# Patient Record
Sex: FEMALE | Race: WHITE | NOT HISPANIC OR LATINO | Employment: OTHER | URBAN - METROPOLITAN AREA
[De-identification: names, ages, dates, MRNs, and addresses within clinical notes are randomized per-mention and may not be internally consistent; named-entity substitution may affect disease eponyms.]

---

## 2017-04-03 ENCOUNTER — HOSPITAL ENCOUNTER (OUTPATIENT)
Dept: GENERAL RADIOLOGY | Facility: HOSPITAL | Age: 77
Discharge: HOME | End: 2017-04-03
Admitting: ORTHOPAEDIC SURGERY

## 2017-04-03 ENCOUNTER — APPOINTMENT (OUTPATIENT)
Dept: PREADMISSION TESTING | Facility: HOSPITAL | Age: 77
End: 2017-04-03

## 2017-04-03 VITALS — BODY MASS INDEX: 24.95 KG/M2 | HEIGHT: 64 IN | WEIGHT: 146.16 LBS

## 2017-04-03 LAB
ANION GAP SERPL CALCULATED.3IONS-SCNC: 7 MMOL/L (ref 3–11)
BUN BLD-MCNC: 18 MG/DL (ref 9–23)
BUN/CREAT SERPL: 16.4 (ref 7–25)
CALCIUM SPEC-SCNC: 10 MG/DL (ref 8.7–10.4)
CHLORIDE SERPL-SCNC: 110 MMOL/L (ref 99–109)
CO2 SERPL-SCNC: 30 MMOL/L (ref 20–31)
CREAT BLD-MCNC: 1.1 MG/DL (ref 0.6–1.3)
DEPRECATED RDW RBC AUTO: 48.1 FL (ref 37–54)
ERYTHROCYTE [DISTWIDTH] IN BLOOD BY AUTOMATED COUNT: 14 % (ref 11.3–14.5)
GFR SERPL CREATININE-BSD FRML MDRD: 48 ML/MIN/1.73
GLUCOSE BLD-MCNC: 91 MG/DL (ref 70–100)
HCT VFR BLD AUTO: 38.6 % (ref 34.5–44)
HGB BLD-MCNC: 12.2 G/DL (ref 11.5–15.5)
MCH RBC QN AUTO: 29.4 PG (ref 27–31)
MCHC RBC AUTO-ENTMCNC: 31.6 G/DL (ref 32–36)
MCV RBC AUTO: 93 FL (ref 80–99)
PLATELET # BLD AUTO: 289 10*3/MM3 (ref 150–450)
PMV BLD AUTO: 10.6 FL (ref 6–12)
POTASSIUM BLD-SCNC: 4.9 MMOL/L (ref 3.5–5.5)
RBC # BLD AUTO: 4.15 10*6/MM3 (ref 3.89–5.14)
SODIUM BLD-SCNC: 147 MMOL/L (ref 132–146)
WBC NRBC COR # BLD: 7.12 10*3/MM3 (ref 3.5–10.8)

## 2017-04-03 PROCEDURE — 93010 ELECTROCARDIOGRAM REPORT: CPT | Performed by: INTERNAL MEDICINE

## 2017-04-03 PROCEDURE — 71020 HC CHEST PA AND LATERAL: CPT

## 2017-04-03 PROCEDURE — 93005 ELECTROCARDIOGRAM TRACING: CPT

## 2017-04-03 PROCEDURE — 85027 COMPLETE CBC AUTOMATED: CPT | Performed by: ORTHOPAEDIC SURGERY

## 2017-04-03 PROCEDURE — 36415 COLL VENOUS BLD VENIPUNCTURE: CPT

## 2017-04-03 PROCEDURE — 80048 BASIC METABOLIC PNL TOTAL CA: CPT | Performed by: ORTHOPAEDIC SURGERY

## 2017-04-16 ENCOUNTER — ANESTHESIA EVENT (OUTPATIENT)
Dept: PERIOP | Facility: HOSPITAL | Age: 77
End: 2017-04-16

## 2017-04-16 RX ORDER — FAMOTIDINE 10 MG/ML
20 INJECTION, SOLUTION INTRAVENOUS ONCE
Status: CANCELLED | OUTPATIENT
Start: 2017-04-16 | End: 2017-04-16

## 2017-04-17 ENCOUNTER — APPOINTMENT (OUTPATIENT)
Dept: GENERAL RADIOLOGY | Facility: HOSPITAL | Age: 77
End: 2017-04-17
Attending: ORTHOPAEDIC SURGERY

## 2017-04-17 ENCOUNTER — HOSPITAL ENCOUNTER (OUTPATIENT)
Facility: HOSPITAL | Age: 77
Setting detail: OBSERVATION
Discharge: HOME | End: 2017-04-18
Attending: ORTHOPAEDIC SURGERY | Admitting: ORTHOPAEDIC SURGERY

## 2017-04-17 ENCOUNTER — ANESTHESIA (OUTPATIENT)
Dept: PERIOP | Facility: HOSPITAL | Age: 77
End: 2017-04-17

## 2017-04-17 DIAGNOSIS — Z78.9 IMPAIRED MOBILITY AND ADLS: Primary | ICD-10-CM

## 2017-04-17 DIAGNOSIS — Z74.09 IMPAIRED FUNCTIONAL MOBILITY, BALANCE, GAIT, AND ENDURANCE: ICD-10-CM

## 2017-04-17 DIAGNOSIS — Z74.09 IMPAIRED MOBILITY AND ADLS: Primary | ICD-10-CM

## 2017-04-17 PROBLEM — M89.8X9 HETEROTOPIC OSSIFICATION: Status: ACTIVE | Noted: 2017-04-17

## 2017-04-17 LAB
BILIRUB UR QL STRIP: NEGATIVE
CLARITY UR: CLEAR
COLOR UR: YELLOW
GLUCOSE UR STRIP-MCNC: NEGATIVE MG/DL
HGB UR QL STRIP.AUTO: NEGATIVE
KETONES UR QL STRIP: NEGATIVE
LEUKOCYTE ESTERASE UR QL STRIP.AUTO: NEGATIVE
NITRITE UR QL STRIP: NEGATIVE
PH UR STRIP.AUTO: 6 [PH] (ref 5–8)
PROT UR QL STRIP: NEGATIVE
SP GR UR STRIP: 1.01 (ref 1–1.03)
UROBILINOGEN UR QL STRIP: NORMAL

## 2017-04-17 PROCEDURE — 25010000002 ONDANSETRON PER 1 MG: Performed by: NURSE ANESTHETIST, CERTIFIED REGISTERED

## 2017-04-17 PROCEDURE — 25010000002 PROPOFOL 10 MG/ML EMULSION: Performed by: NURSE ANESTHETIST, CERTIFIED REGISTERED

## 2017-04-17 PROCEDURE — 25010000002 DEXAMETHASONE PER 1 MG: Performed by: NURSE ANESTHETIST, CERTIFIED REGISTERED

## 2017-04-17 PROCEDURE — 25010000003 CEFAZOLIN IN DEXTROSE 2-4 GM/100ML-% SOLUTION: Performed by: ORTHOPAEDIC SURGERY

## 2017-04-17 PROCEDURE — 94799 UNLISTED PULMONARY SVC/PX: CPT

## 2017-04-17 PROCEDURE — 25010000002 FENTANYL CITRATE (PF) 100 MCG/2ML SOLUTION: Performed by: NURSE ANESTHETIST, CERTIFIED REGISTERED

## 2017-04-17 PROCEDURE — G0378 HOSPITAL OBSERVATION PER HR: HCPCS

## 2017-04-17 PROCEDURE — 97166 OT EVAL MOD COMPLEX 45 MIN: CPT | Performed by: OCCUPATIONAL THERAPIST

## 2017-04-17 PROCEDURE — G8988 SELF CARE GOAL STATUS: HCPCS | Performed by: OCCUPATIONAL THERAPIST

## 2017-04-17 PROCEDURE — G8987 SELF CARE CURRENT STATUS: HCPCS | Performed by: OCCUPATIONAL THERAPIST

## 2017-04-17 PROCEDURE — 25010000002 NEOSTIGMINE PER 0.5 MG: Performed by: NURSE ANESTHETIST, CERTIFIED REGISTERED

## 2017-04-17 PROCEDURE — 25010000002 ROPIVACAINE PER 1 MG: Performed by: NURSE ANESTHETIST, CERTIFIED REGISTERED

## 2017-04-17 PROCEDURE — 73080 X-RAY EXAM OF ELBOW: CPT

## 2017-04-17 PROCEDURE — 99219 PR INITIAL OBSERVATION CARE/DAY 50 MINUTES: CPT | Performed by: NURSE PRACTITIONER

## 2017-04-17 PROCEDURE — 97530 THERAPEUTIC ACTIVITIES: CPT | Performed by: OCCUPATIONAL THERAPIST

## 2017-04-17 PROCEDURE — 81003 URINALYSIS AUTO W/O SCOPE: CPT | Performed by: NURSE PRACTITIONER

## 2017-04-17 RX ORDER — SODIUM CHLORIDE 0.9 % (FLUSH) 0.9 %
1-10 SYRINGE (ML) INJECTION AS NEEDED
Status: DISCONTINUED | OUTPATIENT
Start: 2017-04-17 | End: 2017-04-17 | Stop reason: HOSPADM

## 2017-04-17 RX ORDER — FAMOTIDINE 20 MG/1
20 TABLET, FILM COATED ORAL ONCE
Status: COMPLETED | OUTPATIENT
Start: 2017-04-17 | End: 2017-04-17

## 2017-04-17 RX ORDER — DEXAMETHASONE SODIUM PHOSPHATE 4 MG/ML
INJECTION, SOLUTION INTRA-ARTICULAR; INTRALESIONAL; INTRAMUSCULAR; INTRAVENOUS; SOFT TISSUE AS NEEDED
Status: DISCONTINUED | OUTPATIENT
Start: 2017-04-17 | End: 2017-04-17 | Stop reason: SURG

## 2017-04-17 RX ORDER — ROPIVACAINE HYDROCHLORIDE 2 MG/ML
6 INJECTION, SOLUTION EPIDURAL; INFILTRATION CONTINUOUS
Status: DISCONTINUED | OUTPATIENT
Start: 2017-04-17 | End: 2017-04-18 | Stop reason: HOSPADM

## 2017-04-17 RX ORDER — GLYCOPYRROLATE 0.2 MG/ML
INJECTION INTRAMUSCULAR; INTRAVENOUS AS NEEDED
Status: DISCONTINUED | OUTPATIENT
Start: 2017-04-17 | End: 2017-04-17 | Stop reason: SURG

## 2017-04-17 RX ORDER — LIDOCAINE HYDROCHLORIDE 10 MG/ML
INJECTION, SOLUTION INFILTRATION; PERINEURAL AS NEEDED
Status: DISCONTINUED | OUTPATIENT
Start: 2017-04-17 | End: 2017-04-17 | Stop reason: SURG

## 2017-04-17 RX ORDER — SODIUM CHLORIDE, SODIUM LACTATE, POTASSIUM CHLORIDE, CALCIUM CHLORIDE 600; 310; 30; 20 MG/100ML; MG/100ML; MG/100ML; MG/100ML
INJECTION, SOLUTION INTRAVENOUS CONTINUOUS PRN
Status: DISCONTINUED | OUTPATIENT
Start: 2017-04-17 | End: 2017-04-17 | Stop reason: SURG

## 2017-04-17 RX ORDER — CEFAZOLIN SODIUM 2 G/100ML
2 INJECTION, SOLUTION INTRAVENOUS ONCE
Status: COMPLETED | OUTPATIENT
Start: 2017-04-17 | End: 2017-04-17

## 2017-04-17 RX ORDER — SODIUM CHLORIDE 0.9 % (FLUSH) 0.9 %
1-10 SYRINGE (ML) INJECTION AS NEEDED
Status: DISCONTINUED | OUTPATIENT
Start: 2017-04-17 | End: 2017-04-18 | Stop reason: HOSPADM

## 2017-04-17 RX ORDER — NALOXONE HCL 0.4 MG/ML
0.1 VIAL (ML) INJECTION
Status: DISCONTINUED | OUTPATIENT
Start: 2017-04-17 | End: 2017-04-18 | Stop reason: HOSPADM

## 2017-04-17 RX ORDER — SODIUM CHLORIDE 9 MG/ML
9 INJECTION, SOLUTION INTRAVENOUS CONTINUOUS
Status: DISCONTINUED | OUTPATIENT
Start: 2017-04-17 | End: 2017-04-18 | Stop reason: HOSPADM

## 2017-04-17 RX ORDER — ONDANSETRON 2 MG/ML
4 INJECTION INTRAMUSCULAR; INTRAVENOUS ONCE AS NEEDED
Status: DISCONTINUED | OUTPATIENT
Start: 2017-04-17 | End: 2017-04-17 | Stop reason: HOSPADM

## 2017-04-17 RX ORDER — HYDROMORPHONE HYDROCHLORIDE 1 MG/ML
0.5 INJECTION, SOLUTION INTRAMUSCULAR; INTRAVENOUS; SUBCUTANEOUS
Status: DISCONTINUED | OUTPATIENT
Start: 2017-04-17 | End: 2017-04-18 | Stop reason: HOSPADM

## 2017-04-17 RX ORDER — SODIUM CHLORIDE, SODIUM LACTATE, POTASSIUM CHLORIDE, CALCIUM CHLORIDE 600; 310; 30; 20 MG/100ML; MG/100ML; MG/100ML; MG/100ML
9 INJECTION, SOLUTION INTRAVENOUS CONTINUOUS
Status: DISCONTINUED | OUTPATIENT
Start: 2017-04-17 | End: 2017-04-17

## 2017-04-17 RX ORDER — VANCOMYCIN HYDROCHLORIDE 1 G/200ML
1000 INJECTION, SOLUTION INTRAVENOUS ONCE
Status: DISCONTINUED | OUTPATIENT
Start: 2017-04-17 | End: 2017-04-17

## 2017-04-17 RX ORDER — CEFAZOLIN SODIUM 2 G/100ML
2 INJECTION, SOLUTION INTRAVENOUS EVERY 8 HOURS
Status: COMPLETED | OUTPATIENT
Start: 2017-04-17 | End: 2017-04-18

## 2017-04-17 RX ORDER — SENNA AND DOCUSATE SODIUM 50; 8.6 MG/1; MG/1
2 TABLET, FILM COATED ORAL 2 TIMES DAILY
Status: DISCONTINUED | OUTPATIENT
Start: 2017-04-17 | End: 2017-04-18 | Stop reason: HOSPADM

## 2017-04-17 RX ORDER — LIDOCAINE HYDROCHLORIDE 10 MG/ML
1 INJECTION, SOLUTION EPIDURAL; INFILTRATION; INTRACAUDAL; PERINEURAL ONCE
Status: COMPLETED | OUTPATIENT
Start: 2017-04-17 | End: 2017-04-17

## 2017-04-17 RX ORDER — CEFAZOLIN SODIUM 2 G/100ML
2 INJECTION, SOLUTION INTRAVENOUS ONCE
Status: DISCONTINUED | OUTPATIENT
Start: 2017-04-17 | End: 2017-04-17

## 2017-04-17 RX ORDER — MAGNESIUM HYDROXIDE 1200 MG/15ML
LIQUID ORAL AS NEEDED
Status: DISCONTINUED | OUTPATIENT
Start: 2017-04-17 | End: 2017-04-17 | Stop reason: HOSPADM

## 2017-04-17 RX ORDER — FENTANYL CITRATE 50 UG/ML
50 INJECTION, SOLUTION INTRAMUSCULAR; INTRAVENOUS
Status: DISCONTINUED | OUTPATIENT
Start: 2017-04-17 | End: 2017-04-17 | Stop reason: HOSPADM

## 2017-04-17 RX ORDER — FENTANYL CITRATE 50 UG/ML
INJECTION, SOLUTION INTRAMUSCULAR; INTRAVENOUS AS NEEDED
Status: DISCONTINUED | OUTPATIENT
Start: 2017-04-17 | End: 2017-04-17 | Stop reason: SURG

## 2017-04-17 RX ORDER — BISACODYL 10 MG
10 SUPPOSITORY, RECTAL RECTAL DAILY PRN
Status: DISCONTINUED | OUTPATIENT
Start: 2017-04-17 | End: 2017-04-18 | Stop reason: HOSPADM

## 2017-04-17 RX ORDER — ONDANSETRON 2 MG/ML
INJECTION INTRAMUSCULAR; INTRAVENOUS AS NEEDED
Status: DISCONTINUED | OUTPATIENT
Start: 2017-04-17 | End: 2017-04-17 | Stop reason: SURG

## 2017-04-17 RX ORDER — HYDROCODONE BITARTRATE AND ACETAMINOPHEN 10; 325 MG/1; MG/1
1 TABLET ORAL EVERY 4 HOURS PRN
Status: DISCONTINUED | OUTPATIENT
Start: 2017-04-17 | End: 2017-04-18 | Stop reason: HOSPADM

## 2017-04-17 RX ORDER — PROPOFOL 10 MG/ML
VIAL (ML) INTRAVENOUS AS NEEDED
Status: DISCONTINUED | OUTPATIENT
Start: 2017-04-17 | End: 2017-04-17 | Stop reason: SURG

## 2017-04-17 RX ORDER — ATRACURIUM BESYLATE 10 MG/ML
INJECTION, SOLUTION INTRAVENOUS AS NEEDED
Status: DISCONTINUED | OUTPATIENT
Start: 2017-04-17 | End: 2017-04-17 | Stop reason: SURG

## 2017-04-17 RX ORDER — DOCUSATE SODIUM 100 MG/1
100 CAPSULE, LIQUID FILLED ORAL 2 TIMES DAILY PRN
Status: DISCONTINUED | OUTPATIENT
Start: 2017-04-17 | End: 2017-04-18 | Stop reason: HOSPADM

## 2017-04-17 RX ORDER — BISACODYL 5 MG/1
10 TABLET, DELAYED RELEASE ORAL DAILY PRN
Status: DISCONTINUED | OUTPATIENT
Start: 2017-04-17 | End: 2017-04-18 | Stop reason: HOSPADM

## 2017-04-17 RX ADMIN — ONDANSETRON 4 MG: 2 INJECTION INTRAMUSCULAR; INTRAVENOUS at 09:05

## 2017-04-17 RX ADMIN — FENTANYL CITRATE 50 MCG: 50 INJECTION, SOLUTION INTRAMUSCULAR; INTRAVENOUS at 07:47

## 2017-04-17 RX ADMIN — SODIUM CHLORIDE 9 ML/HR: 9 INJECTION, SOLUTION INTRAVENOUS at 14:48

## 2017-04-17 RX ADMIN — PROPOFOL 120 MG: 10 INJECTION, EMULSION INTRAVENOUS at 07:47

## 2017-04-17 RX ADMIN — Medication 2 TABLET: at 17:44

## 2017-04-17 RX ADMIN — ATRACURIUM BESYLATE 40 MG: 10 INJECTION, SOLUTION INTRAVENOUS at 07:47

## 2017-04-17 RX ADMIN — PROPOFOL 30 MG: 10 INJECTION, EMULSION INTRAVENOUS at 09:54

## 2017-04-17 RX ADMIN — FENTANYL CITRATE 50 MCG: 50 INJECTION INTRAMUSCULAR; INTRAVENOUS at 10:52

## 2017-04-17 RX ADMIN — ROBINUL 0.4 MG: 0.2 INJECTION INTRAMUSCULAR; INTRAVENOUS at 09:05

## 2017-04-17 RX ADMIN — PROPOFOL 20 MG: 10 INJECTION, EMULSION INTRAVENOUS at 09:56

## 2017-04-17 RX ADMIN — ROPIVACAINE HYDROCHLORIDE 6 ML/HR: 2 INJECTION, SOLUTION EPIDURAL; INFILTRATION at 10:07

## 2017-04-17 RX ADMIN — EPHEDRINE SULFATE 15 MG: 50 INJECTION INTRAMUSCULAR; INTRAVENOUS; SUBCUTANEOUS at 08:00

## 2017-04-17 RX ADMIN — FENTANYL CITRATE 50 MCG: 50 INJECTION, SOLUTION INTRAMUSCULAR; INTRAVENOUS at 09:15

## 2017-04-17 RX ADMIN — SODIUM CHLORIDE, POTASSIUM CHLORIDE, SODIUM LACTATE AND CALCIUM CHLORIDE: 600; 310; 30; 20 INJECTION, SOLUTION INTRAVENOUS at 07:42

## 2017-04-17 RX ADMIN — FAMOTIDINE 20 MG: 20 TABLET ORAL at 06:30

## 2017-04-17 RX ADMIN — CEFAZOLIN SODIUM 2 G: 2 INJECTION, SOLUTION INTRAVENOUS at 07:45

## 2017-04-17 RX ADMIN — LIDOCAINE HYDROCHLORIDE 50 MG: 10 INJECTION, SOLUTION INFILTRATION; PERINEURAL at 07:47

## 2017-04-17 RX ADMIN — CEFAZOLIN SODIUM 2 G: 2 INJECTION, SOLUTION INTRAVENOUS at 15:04

## 2017-04-17 RX ADMIN — LIDOCAINE HYDROCHLORIDE 0.2 ML: 10 INJECTION, SOLUTION EPIDURAL; INFILTRATION; INTRACAUDAL; PERINEURAL at 06:26

## 2017-04-17 RX ADMIN — Medication 5 MG: at 22:10

## 2017-04-17 RX ADMIN — Medication 3 MG: at 09:05

## 2017-04-17 RX ADMIN — DEXAMETHASONE SODIUM PHOSPHATE 8 MG: 4 INJECTION, SOLUTION INTRAMUSCULAR; INTRAVENOUS at 07:57

## 2017-04-17 RX ADMIN — SODIUM CHLORIDE 9 ML/HR: 9 INJECTION, SOLUTION INTRAVENOUS at 06:26

## 2017-04-17 NOTE — CONSULTS
[facility] Medicine Services  HISTORY AND PHYSICAL    Primary Care Physician: Darin Baugh MD  Subjective   Chief Complaint:  Elbow surgery    History of Present Illness:   Mrs. Patterson is a 76-year-old female who was admitted today, 4/17/17, for surgery on her left elbow to reduce a contracture from previous fracture.  Patient states that her entire arm and hand are numb.  Patient has no complaints at this time.  Patient thought she was going home after her surgery, but Dr. Bonilla told her he wanted her to stay.    Review of Systems   Constitutional: Negative for activity change, appetite change, chills, fatigue and fever.   HENT: Negative for congestion, mouth sores, rhinorrhea and sore throat.    Eyes: Negative.    Respiratory: Negative for cough, shortness of breath and wheezing.    Cardiovascular: Negative for chest pain and leg swelling.   Gastrointestinal: Negative for abdominal distention, abdominal pain, constipation, diarrhea, nausea and vomiting.   Endocrine: Negative.    Genitourinary: Positive for frequency and urgency. Negative for difficulty urinating and dysuria.   Musculoskeletal: Positive for joint swelling (left elbow). Negative for back pain, neck pain and neck stiffness.   Skin: Negative for color change, pallor and rash.   Neurological: Positive for numbness (left hand s/p surgery). Negative for dizziness, facial asymmetry, speech difficulty and weakness.   Hematological: Negative for adenopathy.   Psychiatric/Behavioral: Negative for confusion, sleep disturbance and suicidal ideas. The patient is not nervous/anxious.    Otherwise complete ROS performed and negative except as mentioned in the HPI.    Past Medical History:   Diagnosis Date   • Arthritis    • Chest pain syndrome 9/7/2016   • Dyslipidemia 9/7/2016   • Glaucoma 9/7/2016   • Insomnia 9/7/2016   • Osteoporosis    • Polymyalgia rheumatica 9/7/2016   • PONV (postoperative nausea and vomiting)     AFTER  "HYSTERECTOMY   • Renal disorder    • Rotator cuff tear, left    • Wears glasses     READERS     Past Surgical History:   Procedure Laterality Date   • CARPAL TUNNEL RELEASE      LEFT   • CATARACT EXTRACTION      BILAT    • COLONOSCOPY      2013   • HYSTERECTOMY      TOTAL   • JOINT REPLACEMENT      KNEE LEFT REPLACEMENT   • ORIF HUMERUS FRACTURE Right 09/12/2016   • REPLACEMENT TOTAL KNEE  2003   • SKIN GRAFT      right foot 2014   • TOTAL ELBOW ARTHROPLASTY Right 09/12/2016   • TOTAL ELBOW ARTHROPLASTY Right 9/12/2016    Procedure: TOTAL ELBOW ARTHROPLASTY; ULNAR NERVE TRANSPOSITION;  Surgeon: Trung Bonilla MD;  Location: [facility];  Service:      Family History   Problem Relation Age of Onset   • Cancer Father      Social History     Social History   • Marital status:      Spouse name: N/A   • Number of children: N/A   • Years of education: N/A     Occupational History   • Not on file.     Social History Main Topics   • Smoking status: Never Smoker   • Smokeless tobacco: Never Used   • Alcohol use No   • Drug use: No   • Sexual activity: Defer     Other Topics Concern   • Not on file     Social History Narrative     Medications:  Prescriptions Prior to Admission   Medication Sig Dispense Refill Last Dose   • aspirin 81 MG EC tablet Take 81 mg by mouth Daily.   4/16/2017 at Unknown time   • cholecalciferol (VITAMIN D3) 1000 UNITS tablet Take 2,000 Units by mouth daily.   4/16/2017   • ferrous sulfate 325 (65 FE) MG tablet Take 325 mg by mouth daily with breakfast.   4/16/2017 at 0700   • Raloxifene HCl (EVISTA PO) Take 60 mg by mouth daily.   Past Week at Unknown time   • SIMVASTATIN PO Take 40 mg by mouth daily.   Past Week at Unknown time   • diphenhydrAMINE (NIGHTTIME SLEEP AID) 25 MG tablet Take 25 mg by mouth at night as needed for sleep.   4/15/2017     Allergies:  Phenergan  Objective   Physical Exam:  Vital Signs: /54  Pulse 64  Temp 97.9 °F (36.6 °C)  Resp 16  Ht 64\" (162.6 cm)  Wt " 146 lb (66.2 kg)  LMP Comment: >55 years old  SpO2 97%  Breastfeeding? No  BMI 25.06 kg/m2  Physical Exam  General: Alert, well-developed well-nourished female in no acute distress    Head: Normocephalic atraumatic    Eyes: PERRLA, EOMI, nonicteric, conjunctiva normal    ENT: Pink, moist mucous membranes    Neck: Supple, nontender, trachea midline without lymphadenopathy, JVD, nuchal rigidity.      Cardiovascular: RRR  no M/R/G    Respiratory: Nonlabored, symmetrical chest expansion, clear to auscultation bilaterally    Abdomen: Soft, nontender, nondistended,  positive bowel sounds in all 4 quadrants     Extremities: FROM in upper and lower extremities bilaterally with the exception of left elbow which is wrapped in Ace wrap bandage status post surgery.  No E/C/C  Negative calf pain.  +2 DP pulses bilaterally.  Neuro/Yanez catheter in right neck    Skin: Pink/warm/dry.  No rash or lesions noted. Capillary refill<2sec in right fingertips distal to right elbow.    Neuro: Alert and oriented to person place time and situation, speech is clear, follows all commands, recent and remote memory intact    Psych: Patient is pleasant and cooperative.  Normal affect.  Negative suicidal ideation or homicidal ideation.    Results Reviewed:  Imaging Results (last 24 hours)     Procedure Component Value Units Date/Time    XR Elbow 3+ View Right [[id number]] Collected:  04/17/17 1411     Updated:  04/17/17 1411    Narrative:       EXAMINATION: XR ELBOW 3+ VW RIGHT- 04/17/2017     INDICATION: Total elbow s/p excision of HO and capsular release      COMPARISON: 09/03/2016 right elbow plain films, and 09/12/2016  intraoperative images.     FINDINGS: History indicates recent surgery including capsular release.  The elbow joint prosthesis appears anatomically aligned. The bony  structures appear intact. There is some soft tissue gas dorsally,  typical for postop patient. No fracture line or bony avulsion is  identified.        Impression:       Elbow prosthesis in anatomic alignment.         D:  04/17/2017  E:  04/17/2017           I have personally reviewed and interpreted available lab data, radiology studies and ECG obtained at time of admission  Assessment / Plan   Assessment/Problem List:   Hospital Problem List     Dyslipidemia    GERD (gastroesophageal reflux disease)    CKD (chronic kidney disease) stage 3, GFR 30-59 ml/min    Heterotopic ossification   Plan:  Heterotopic ossification  --Followed and managed by Dr Bonilla  --IV Ancef pre surgery  --peripheral nerve cath in place   --IS  --Pain controlled; right arm numb    Dyslipidemia  --Home dose simvastatin    GERD (gastroesophageal reflux disease)  --famotidine    CKD (chronic kidney disease) stage 3, GFR 30-59 ml/min  --give nephrotoxic medications sparingly    Urinary frequency  --UA pending    DVT prophylaxis:  SCD/TEDs  Code Status: Full  Dispo:  Will most likely discharge tomorrow, but will be up to ORION Cazares 04/17/17 3:17 PM    I was available for questions, however patient care provided by orion

## 2017-04-17 NOTE — ANESTHESIA PREPROCEDURE EVALUATION
Anesthesia Evaluation     Patient summary reviewed and Nursing notes reviewed      Airway   Mallampati: II  TM distance: >3 FB  Neck ROM: full  no difficulty expected  Dental - normal exam     Pulmonary - negative pulmonary ROS and normal exam   Cardiovascular - negative cardio ROS and normal exam        Neuro/Psych- negative ROS  GI/Hepatic/Renal/Endo    (+)  GERD, chronic renal disease CRI,     Musculoskeletal     Abdominal  - normal exam    Bowel sounds: normal.   Substance History - negative use     OB/GYN negative ob/gyn ROS         Other   (+) arthritis                               Anesthesia Plan    ASA 2     general   (Peripheral nerve block for post op pain relief)  intravenous induction   Anesthetic plan and risks discussed with patient.    Plan discussed with CRNA.

## 2017-04-17 NOTE — PLAN OF CARE
Problem: Patient Care Overview (Adult)  Goal: Plan of Care Review  Outcome: Ongoing (interventions implemented as appropriate)    04/17/17 1824   Coping/Psychosocial Response Interventions   Plan Of Care Reviewed With patient   Outcome Evaluation   Outcome Summary/Follow up Plan OT evaluation complete and pt education initiated. Pt had no c/o pain and tolerated AAROM right elbow . Pt impusive, poor safety awareness, reports h/o multiple falls and her spouse is curently [facility]. Recommend IP rehab, pt declines and desire DC home. Recommend HHOT/PT services. Pt failed mobility screen with score off 16, recommend PT evaluation.          Problem: Inpatient Occupational Therapy  Goal: Transfer Training Goal 1 LTG- OT  Outcome: Ongoing (interventions implemented as appropriate)    04/17/17 1824   Transfer Training OT LTG   Transfer Training OT LTG, Date Established 04/17/17   Transfer Training OT LTG, Time to Achieve by discharge   Transfer Training OT LTG, Activity Type sit to stand/stand to sit;toilet   Transfer Training OT LTG, [place] Level verbal cues required;supervision required   Transfer Training OT LTG, Assist Device (AD PRN)       Goal: Range of Motion Goal LTG- OT  Outcome: Ongoing (interventions implemented as appropriate)    04/17/17 1824   Range of Motion OT LTG   Range of Motion Goal OT LTG, Date Established 04/17/17   Range of Motion Goal OT LTG, Time to Achieve by discharge   Range of Motion Goal OT LTG, AROM Measure Pt will compelte RUE HEP with supervision in preparation for safe DC home.        Goal: Patient Education Goal LTG- OT  Outcome: Ongoing (interventions implemented as appropriate)    04/17/17 1824   Patient Education OT LTG   Patient Education OT LTG, Date Established 04/17/17   Patient Education OT LTG, Time to Achieve by discharge   Patient Education OT LTG, Education Type written program;precautions per surgeon;1 hand/maurice technique;home safety   Patient Education  OT LTG, Education Understanding demonstrates adequately;verbalizes understanding       Goal: UB Dressing Goal LTG- OT  Outcome: Ongoing (interventions implemented as appropriate)    04/17/17 1824   UB Dressing OT LTG   UB Dressing Goal OT LTG, Date Established 04/17/17   UB Dressing Goal OT LTG, Time to Achieve by discharge   UB Dressing Goal OT LTG, Activity Type Pt will complete UB dressing task   UB Dressing Goal OT LTG, [place] Level minimum assist (75% patient effort);verbal cues required

## 2017-04-17 NOTE — BRIEF OP NOTE
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]    Procedure/CPT® Codes:  [place] RADICAL RESECT ELBOW, CONTRAC RELEAS [09394]    Procedure(s):  Right elbow radical resection capsule soft tissue heterotopic bone with contracture release    Surgeon(s):  MD Nicanor Byrd MD, PGY-5    Anesthesia: General with Block    Staff:   Circulator: Kassidy Cota RN  Scrub Person: Tenzin Mosqueda  Nursing Assistant: Bryon Chavez    Estimated Blood Loss: * No values recorded between 4/17/2017  7:42 AM and 4/17/2017  9:25 AM *  Urine Voided: * No values recorded between 4/17/2017  7:42 AM and 4/17/2017  9:25 AM *    Specimens:                * No specimens in log *      Drains:           Findings: per dictation    Complications: none      Trung Bonilla MD     Date: 4/17/2017  Time: 9:25 AM

## 2017-04-17 NOTE — PLAN OF CARE
Problem: Perioperative Period (Adult)  Goal: Signs and Symptoms of Listed Potential Problems Will be Absent or Manageable (Perioperative Period)  Outcome: Ongoing (interventions implemented as appropriate)    04/17/17 1301   Perioperative Period   Problems Assessed (Perioperative Period) all   Problems Present (Perioperative Period) situational response

## 2017-04-17 NOTE — PROGRESS NOTES
Acute Care - Occupational Therapy Initial Evaluation   [facility]     Patient Name: Yanet Grove  : 1940  MRN: [id number]  Today's Date: 2017  Onset of Illness/Injury or Date of Surgery Date: 17  Date of Referral to OT: 17  Referring Physician: Dr. Bonilla    Admit Date: 2017       ICD-10-CM ICD-9-CM   1. Impaired mobility and ADLs Z74.09 799.89     Patient Active Problem List   Diagnosis   • Chest pain syndrome   • Insomnia   • Dyslipidemia   • Glaucoma   • Polymyalgia rheumatica   • Closed fracture of right distal humerus   • CKD (chronic kidney disease) stage 3, GFR 30-59 ml/min   • Impaired functional mobility, balance, gait, and endurance   • Heterotopic ossification     Past Medical History:   Diagnosis Date   • Arthritis    • Chest pain syndrome 2016   • Dyslipidemia 2016   • GERD (gastroesophageal reflux disease) 2016   • Glaucoma 2016   • Insomnia 2016   • Osteoporosis    • Polymyalgia rheumatica 2016   • PONV (postoperative nausea and vomiting)     AFTER HYSTERECTOMY   • Renal disorder    • Rotator cuff tear, left    • Wears glasses     READERS     Past Surgical History:   Procedure Laterality Date   • CARPAL TUNNEL RELEASE      LEFT   • CATARACT EXTRACTION      BILAT    • COLONOSCOPY         • HYSTERECTOMY      TOTAL   • JOINT REPLACEMENT      KNEE LEFT REPLACEMENT   • ORIF HUMERUS FRACTURE Right 2016   • REPLACEMENT TOTAL KNEE     • SKIN GRAFT      right foot    • TOTAL ELBOW ARTHROPLASTY Right 2016   • TOTAL ELBOW ARTHROPLASTY Right 2016    Procedure: TOTAL ELBOW ARTHROPLASTY; ULNAR NERVE TRANSPOSITION;  Surgeon: Trung Bonilla MD;  Location: [facility];  Service:           OT ASSESSMENT FLOWSHEET (last 72 hours)      OT Evaluation       17 1610 17 1242 17 1135 17 0623       Rehab Evaluation    Document Type evaluation  -AR        Subjective Information no complaints;agree to therapy  -AR         Patient Effort, Rehab Treatment good  -AR        Symptoms Noted During/After Treatment none  -AR        General Information    Patient Profile Review yes  -AR        Onset of Illness/Injury or Date of Surgery Date 04/17/17  -AR        Referring Physician Dr. Bonilla  -AR        General Observations Pt up in chair, no family at bedside and RUE elevated  -AR        Pertinent History Of Current Problem Pt is a 76 yof h/o R comminuted intraarticular distal humerus FX s/p fall 9/6/16 with resultant right TEA 9/12/16. 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 Pt is POD#0 resection of heterotopic ossification and anterior and posterior capsular release with resection of capsule. OT orders indicate agressive R elbow ROM, and NWB RUE. Pt is R-hand dominant. Prior to admission, she reports pain and difficulty with self-feeding, grooming, completing UB ADLS and pain interrupted sleep at night.    -AR        Precautions/Limitations fall precautions;non-weight bearing status;other (see comments)   infraclavicular nerve catheter  -AR        Prior Level of Function independent:;all household mobility;community mobility;gait;transfer;min assist:;ADL's;home management;cooking;cleaning;driving;shopping  -AR        Equipment Currently Used at Home cane, straight;commode  -AR   none  -RS     Plans/Goals Discussed With patient;agreed upon  -AR        Risks Reviewed patient:;LOB;nausea/vomiting;dizziness;increased discomfort;change in vital signs;increased drainage;lines disloged  -AR        Benefits Reviewed patient:;increase independence;increase strength;increase balance;decrease risk of DVT;increase knowledge  -AR        Barriers to Rehab family issues  -AR        Living Environment    Lives With spouse  -AR   spouse  -RS     Living Arrangements house  -AR   house  -RS     Home  Accessibility no concerns  -AR   no concerns  -RS     Stair Railings at Home    none  -RS     Type of Financial/Environmental Concern    none  -RS     Transportation Available car;family or friend will provide  -AR   car;family or friend will provide  -RS     Living Environment Comment Pt lives with her spouse who is curently hospitalized at [facility] and unable to assist. Pt reports h/o several recent falls and has nodule of face from recent fall. No one is available to assist her at home.   -AR        Clinical Impression    Date of Referral to OT 04/17/17  -AR        OT Diagnosis decreased independence with ADLs  -AR        Patient/Family Goals Statement return home and care for her   -AR        Criteria for Skilled Therapeutic Interventions Met yes;treatment indicated  -AR        Rehab Potential good, to achieve stated therapy goals  -AR        Therapy Frequency daily   per priority policy  -AR        Anticipated Discharge Disposition inpatient rehabilitation facility   pt declines and desire DC home  -AR        Functional Level Prior    Ambulation  0-->independent  -LC  0-->independent  -RS     Transferring  0-->independent  -LC  0-->independent  -RS     Toileting  0-->independent  -LC  0-->independent  -RS     Bathing  0-->independent  -LC  0-->independent  -RS     Dressing  0-->independent  -LC  0-->independent  -RS     Eating  0-->independent  -LC  0-->independent  -RS     Communication  0-->understands/communicates without difficulty  -LC  0-->understands/communicates without difficulty  -RS     Swallowing  0-->swallows foods/liquids without difficulty  -LC  0-->swallows foods/liquids without difficulty  -RS     Prior Functional Level Comment  pt was independent with ADL's  -LC       Pain Assessment    Pain Assessment No/denies pain  -AR        Vision Assessment/Intervention    Visual Impairment WFL with corrective lenses  -AR        Vision Comment --   glasses unavailable, at pt's home  -AR         Cognitive Assessment/Intervention    Current Cognitive/Communication Assessment functional  -AR        Orientation Status oriented x 4  -AR        Follows Commands/Answers Questions 100% of the time  -AR        Personal Safety mild impairment;impulsive;decreased awareness, need for safety  -AR        Personal Safety Interventions fall prevention program maintained  -AR        Additional Documentation --   Exit alarm placed, RN aware  -AR        ROM (Range of Motion)    General ROM --   LUE WFL, RUE impaired thoroughout  -AR        MMT (Manual Muscle Testing)    General MMT Assessment other (see comments)   LUE WFL, RUE deferred  -AR        Muscle Tone Assessment    Muscle Tone Assessment   Bilateral Upper Extremities;Bilateral Lower Extremities  -LC      Bilateral Upper Extremities Muscle Tone Assessment   mildly decreased tone  -LC      Bilateral Lower Extremities Muscle Tone Assessment   mildly decreased tone  -LC      Mobility Assessment/Training    Extremity Weight-Bearing Status right upper extremity  -AR        Right Upper Extremity Weight-Bearing non weight-bearing  -AR        Bed Mobility, Assessment/Treatment    Bed Mobility, Comment Not observed, pt UIC. Educated her on NWB RUE and importance of maintaining during bed mobility activities.  -AR        Transfer Assessment/Treatment    Transfers, Sit-Stand [place] contact guard assist;verbal cues required  -AR        Transfers, Stand-Sit [place] contact guard assist;verbal cues required  -AR        Transfers, Sit-Stand-Sit, Assist Device other (see comments)   LUE support  -AR        Toilet Transfer, [place] minimum assist (75% patient effort);verbal cues required  -AR        Transfer, Impairments ROM decreased;impaired balance;other (see comments)   poor overall safety awareness  -AR        Transfer, Comment Pt hesitant to allow OT to use gait belt. Pt reports h/o falls but does not want assist with mobility. Poor awareness of lines during  transfer and pt standing prior to OT donning gait belt despite therapist asking her ot wait for application of belt prior to standing. Educated pt on importance of being aware of environment and attending to RUE as not to bump it on doorways or fixed object and to avoid reaching back for chair and putting weight through operated leg. Pt with decreased attention to therapists instruction.    -AR        Functional Mobility    Functional Mobility- Ind. Level minimum assist (75% patient effort);verbal cues required  -AR        Functional Mobility- Device other (see comments)   HHA LUE, pt reports using cane in past and may benefit from   -AR        Functional Mobility-Distance (Feet) 200  -AR        Functional Mobility- Comment pt with LOB episode when HHA to LUE was removed. Pt scored 16 on mobility screen, will need PT evaluation.   -AR        Upper Body Bathing Assessment/Training    UB Bathing Assess/Train, Comment Educated pt on right axilla care for comfort and importance of keeping dressing site at nerve catheter dry  -AR        Upper Body Dressing Assessment/Training    UB Dressing Assess/Train, Comment Educated pt on maurice-dressing for comfort and care of nerve catheter during UB ADLS.   -AR        Lower Body Dressing Assessment/Training    LB Dressing Assess/Train, Clothing Type donning:;slipper socks  -AR        LB Dressing Assess/Train, Position sitting  -AR        LB Dressing Assess/Train, [place] maximum assist (25% patient effort)  -AR        Toileting Assessment/Training    Toileting Assess/Train, Position sitting;standing  -AR        Toileting Assess/Train, Indepen Level contact guard assist   post-toilet hygiene and clothing management  -AR        Toileting Assess/Train, Impairments impaired balance;other (see comments)   decreased safety awareness  -AR        Therapy Exercises    Right Upper Extremity AROM:;hand pumps;AAROM:;10 reps;sitting;elbow flexion/extension;pronation/supination   tolerated  AAROM elbow .   -AR        Exercise Protocols --   Issued and reviewed RUE HEP, ed on pendulum for shoulder PRN  -AR        Sensory Assessment/Intervention    Sensory Impairment numbness   RUE from block  -AR        Edema Management    Edema Amount moderate  -AR        Skin/Derm Type right:  -AR        Edema Interventions education;elevation  -AR        General Therapy Interventions    ADL Retraining Educated pt on maurice-dressing, care of nerve catheter during UB ADLS and NWB RUE  -AR        Edema Management elevation  -AR        Home Exercise Program issued and reviewed RUE HEP  -AR        Transfer Training educated pt on safe transfer technique and maintaining NWB RUE  -AR        Positioning and Restraints    Pre-Treatment Position sitting in chair/recliner  -AR        Post Treatment Position chair  -AR        In Chair reclined;call light within reach;encouraged to call for assist;exit alarm on;with nsg;RUE elevated;compression device  -AR          User Key  (r) = Recorded By, (t) = Taken By, (c) = Cosigned By    Initials Name Effective Dates    AR Tess Goldstein OT 06/22/15 -     MARGUERITE Deng RN 06/16/16 -     RS Alisa Murphy RN 01/20/17 -            Occupational Therapy Education     Title: PT OT SLP Therapies (Active)     Topic: Occupational Therapy (Active)     Point: ADL training (Active)    Description: Instruct learner(s) on proper safety adaptation and remediation techniques during self care or transfers.   Instruct in proper use of assistive devices.    Learning Progress Summary    Learner Readiness Method Response Comment Documented by Status   Patient Acceptance E,TB,D,H NR Reviewed NWB RUE, RUE HEP, ADL retraining, transfer trianing, bed mobiity, home safety, DC recommendation of rehab as she currently does not have assist at home AR 04/17/17 4044 Active               Point: Home exercise program (Active)    Description: Instruct learner(s) on appropriate technique for monitoring,  assisting and/or progressing therapeutic exercises/activities.    Learning Progress Summary    Learner Readiness Method Response Comment Documented by Status   Patient Acceptance E,TB,D,H NR Reviewed NWB RUE, RUE HEP, ADL retraining, transfer trianing, bed mobiity, home safety, DC recommendation of rehab as she currently does not have assist at home AR 04/17/17 1822 Active               Point: Precautions (Active)    Description: Instruct learner(s) on prescribed precautions during self-care and functional transfers.    Learning Progress Summary    Learner Readiness Method Response Comment Documented by Status   Patient Acceptance E,TB,D,H NR Reviewed NWB RUE, RUE HEP, ADL retraining, transfer trianing, bed mobiity, home safety, DC recommendation of rehab as she currently does not have assist at home AR 04/17/17 1822 Active               Point: Body mechanics (Active)    Description: Instruct learner(s) on proper positioning and spine alignment during self-care, functional mobility activities and/or exercises.    Learning Progress Summary    Learner Readiness Method Response Comment Documented by Status   Patient Acceptance E,TB,D,H NR Reviewed NWB RUE, RUE HEP, ADL retraining, transfer trianing, bed mobiity, home safety, DC recommendation of rehab as she currently does not have assist at home AR 04/17/17 1822 Active                      User Key     Initials Effective Dates Name Provider Type Discipline    AR 06/22/15 -  Tess Goldstein OT Occupational Therapist OT                  OT Recommendation and Plan  Anticipated Discharge Disposition: inpatient rehabilitation facility (pt declines and desire DC home)  Therapy Frequency: daily (per priority policy)  Plan of Care Review  Plan Of Care Reviewed With: patient  Outcome Summary/Follow up Plan: OT evaluation complete and pt education initiated. Pt had no c/o pain and tolerated AAROM right elbow . Pt impusive, poor safety awareness, reports h/o multiple  falls and her spouse is curently hospitalized at [facility]. Recommend IP rehab, pt declines and desire DC home. Recommend HHOT/PT services. Pt failed mobility screen with score off 16, recommend PT evaluation.           OT Goals       04/17/17 1824          Transfer Training OT LTG    Transfer Training OT LTG, Date Established 04/17/17  -AR      Transfer Training OT LTG, Time to Achieve by discharge  -AR      Transfer Training OT LTG, Activity Type sit to stand/stand to sit;toilet  -AR      Transfer Training OT LTG, [place] Level verbal cues required;supervision required  -AR      Transfer Training OT LTG, Assist Device --   AD PRN  -AR      Range of Motion OT LTG    Range of Motion Goal OT LTG, Date Established 04/17/17  -AR      Range of Motion Goal OT LTG, Time to Achieve by discharge  -AR      Range of Motion Goal OT LTG, AROM Measure Pt will compelte FELICIAE HEP with supervision in preparation for safe DC home.   -AR      Patient Education OT LTG    Patient Education OT LTG, Date Established 04/17/17  -AR      Patient Education OT LTG, Time to Achieve by discharge  -AR      Patient Education OT LTG, Education Type written program;precautions per surgeon;1 hand/maurice technique;home safety  -AR      Patient Education OT LTG, Education Understanding demonstrates adequately;verbalizes understanding  -AR      UB Dressing OT LTG    UB Dressing Goal OT LTG, Date Established 04/17/17  -AR      UB Dressing Goal OT LTG, Time to Achieve by discharge  -AR      UB Dressing Goal OT LTG, Activity Type Pt will complete UB dressing task  -AR      UB Dressing Goal OT LTG, [place] Level minimum assist (75% patient effort);verbal cues required  -AR        User Key  (r) = Recorded By, (t) = Taken By, (c) = Cosigned By    Initials Name Provider Type    JOSEFINA Goldstein, OT Occupational Therapist                Outcome Measures       04/17/17 1610          How much help from another is currently needed...    Putting on and taking  off regular lower body clothing? 2  -AR      Bathing (including washing, rinsing, and drying) 2  -AR      Toileting (which includes using toilet bed pan or urinal) 3  -AR      Putting on and taking off regular upper body clothing 2  -AR      Taking care of personal grooming (such as brushing teeth) 3  -AR      Eating meals 3  -AR      Score 15  -AR      Functional Assessment    Outcome Measure Options AM-PAC 6 Clicks Daily Activity (OT)  -AR        User Key  (r) = Recorded By, (t) = Taken By, (c) = Cosigned By    Initials Name Provider Type    AR Tess Goldstein OT Occupational Therapist          Time Calculation:   OT Start Time: 1610    Therapy Charges for Today     Code Description Service Date Service Provider Modifiers Qty    [id number]  OT EVAL MOD COMPLEXITY 4 4/17/2017 Tess Goldstein OT GO 1    [id number]  OT THERAPEUTIC ACT EA 15 MIN 4/17/2017 Tess Goldstein OT GO 2    [id number]  OT THER SUPP EA 15 MIN 4/17/2017 Tess Goldstein OT GO 2    [id number]  OT SELFCARE CURRENT 4/17/2017 SONJA Cabrera, CK 1    [id number]  OT SELFCARE PROJECTED 4/17/2017 SONJA Cabrera, CJ 1          OT G-codes  OT Professional Judgement Used?: Yes  OT Functional Scales Options: AM-PAC 6 Clicks Daily Activity (OT)  Functional Assessment Tool Used: 6 clicks  Score: 15  Functional Limitation: Self care  Self Care Current Status (): At least 40 percent but less than 60 percent impaired, limited or restricted  Self Care Goal Status (): At least 20 percent but less than 40 percent impaired, limited or restricted    Tess Goldstein OT  4/17/2017

## 2017-04-17 NOTE — ANESTHESIA PROCEDURE NOTES
Peripheral Block    Patient location during procedure: post-op  Start time: 4/17/2017 9:50 AM  Stop time: 4/17/2017 10:04 AM  Reason for block: at surgeon's request and post-op pain management  Performed by  CRNA: AVELINO WILLIAMSON  Preanesthetic Checklist  Completed: patient identified, site marked, surgical consent, pre-op evaluation, timeout performed, IV checked, risks and benefits discussed and monitors and equipment checked  Peripheral Block Prep:  Sterile barriers:cap, gloves, mask and sterile barriers  Prep: ChloraPrep  Patient monitoring: blood pressure monitoring, continuous pulse oximetry and EKG  Peripheral Procedure  Sedation:yes  Guidance:ultrasound guided  Images:still images obtained    Laterality:right  Block Type:infraclavicular  Injection Technique:catheter  Needle Type:Tuohy  Needle Gauge:18 G  Catheter Size:20 G (20g)  Medications  Local Injected:bupivacaine 0.25% without epinephrine Local Amount Injected:20mL  Post Assessment  Injection Assessment: negative aspiration for heme, no paresthesia on injection and incremental injection  Patient Tolerance:comfortable throughout block  Complications:no  Additional Notes  Procedure:                 CATHETER at skin: 7                                        Analgesia was achieved with 1% Lidocaine 2 ml for infiltration of skin in PACU       The affected upper extremity was adducted within the patients ROM.  The US probe was placed approximately at the distal inferior third of the clavicle in a cephalad to caudad orientation.  The brachial plexus, subclavian artery and vein where visualized and the patient was marked and sterile prep and drape where completed.   A 4 inch B-Randall echogenic Touhy 360 degree needle was placed inferiorly to the clavicle in a caudad direction, in plane US technique.  The needle was visualized as it passed through Pectoralis Major and to the posterior aspect of the artery where LA was placed and spread was visualized. Injection of  LA was incremental, and negative aspiration every 5 MLs.  Injection pressure was also noted as minimal and patient denied pain on injection.   A 20 gauge catheter was then placed through the needle and positioned and location was confirmed with injection of LA.  The catheter insertions site was sealed with skin afix and steristrips secured the curled catheter.  A sterile CHG/Tegaderm was placed over the site and the LABELED catheter taped to skin.  Thank you

## 2017-04-17 NOTE — OP NOTE
DATE OF PROCEDURE:  04/17/2017     PREOPERATIVE DIAGNOSES:   1.  Status post right total elbow arthroplasty for distal humeral fracture.   2.  Heterotopic ossification, right elbow.   3.  Right elbow contracture.    POSTOPERATIVE DIAGNOSES:   1.  Status post right total elbow arthroplasty for distal humeral fracture.   2.  Heterotopic ossification, right elbow.   3.  Right elbow contracture.    PROCEDURES PERFORMED:    1.  Resection of heterotopic ossification.   2.  Anterior and posterior capsular release with resection of capsule, right elbow.     SURGEON: Trung Bonilla MD     ASSISTANT: Nicanor Christopher MD, PGY-5     ANESTHESIA: General with postoperative block.     ESTIMATED BLOOD LOSS: Minimal.     COMPLICATIONS: None.     DISPOSITION: To the recovery room in stable condition.     TOURNIQUET TIME: 83 minutes at 250 mmHg.     INDICATIONS FOR PROCEDURE: This is a 76-year-old female who sustained a right distal humerus fracture approximately 6 months ago, treated with primarily with a right total elbow arthroplasty. She did well from a pain relief standpoint, but developed significant limitation in her range of motion and aggressive and progressive development of heterotopic ossification. Once radiographic evidence of maturity of the heterotopic ossification occurred, risks, benefits, and alternatives were discussed, and she wished to proceed with surgical excision of the heterotopic bone and capsular release due to her deficiency of range of motion. Her preoperative range of motion was approximately 20° to 90° with a firm endpoint at 90°. The heterotopic bone radiographically was anteromedially and projecting from just above the anterior flange of the humeral component down toward the ulnar component. After full discussion of risks, benefits, and alternatives, she wished to proceed with surgical treatment. She is also planned for postoperative radiation therapy treatment to be done on postop day one, and she is  intolerant of NSAIDs.     DESCRIPTION OF PROCEDURE: On the day of surgery, she identified her right elbow as the correct operative extremity. This was initialed by the surgeon with her acknowledgment. She was taken to the operating room and placed in the supine position. Upon induction of adequate anesthesia, her right upper extremity was prepped and draped in the usual sterile fashion. Time-out confirmed the correct patient and operative extremity, and that antibiotics were on board. A well-padded sterile tourniquet was applied and the arm was exsanguinated with an Esmarch and the tourniquet inflated to 250 mmHg. Her previous posterior incision was utilized and was extended slightly proximally. A lateral flap was developed over the triceps fascia and carried around to the lateral side of the elbow. Her skin and subcutaneous tissue was very thin and there were 2 areas of small penetration of the skin, but a flap was developed over to the lateral side and the interval between the brachioradialis and the triceps was developed, and a column-type procedure was performed with release of the anterior structures directly off the lateral column of the humerus and proceeding anteriorly, carried distally into a flexed extensor origin split. Her previous native radial head was still pristine and in good condition. The release was carried carefully subperiosteally using a Bovie until the medial side was reached. A Hohmann retractor was placed as a previous anterior ulnar nerve transposition had been performed and this was felt to be safe. The heterotopic bone could be seen extending from anterior just proximal to the anterior flange which was identified and extending down distally. An osteotome was used to resect this and it did extend rather far over to the medial edge. This heterotopic bone was fully excised and anterior soft tissues were released using sharp knife and rongeur with care to protect the neurovascular structures  and stay deep to the brachialis. Full anterior release was accomplished fully to the medial side with no residual bone palpable or identified, yet there still remained a very difficult flexion with no firm mechanical block, but significant tightness. The release was then carried subperiosteally under the triceps posteriorly and a complete posterior release was performed to the medial side. Capsule was excised from the posterior compartment and the posterior joint was opened up and adhesions excised. Upon completion of this and subperiosteal release of the triceps, the arm was able to then be taken through a full range of motion from full extension to 135° to 140° of flexion without difficulty with the tourniquet on. At this point, the joint was copiously irrigated both anteriorly and posteriorly. The implant was secure and stable. The subcutaneous tissue was closed with 2-0 Vicryl, and the skin with nylon in an interrupted fashion. A sterile dressing was placed. Anesthesia was reversed and the patient was taken to the recovery room in a stable condition. All instrument, needle, and sponge counts were correct.       MD DANIEL Virk/anthony  DD: 04/17/2017 09:32:52  DT: 04/17/2017 11:07:33  Voice Rec. ID #[id number]  Voice Original ID #[id number]  Doc ID #[id number]  Rev. #0  cc:

## 2017-04-17 NOTE — ANESTHESIA POSTPROCEDURE EVALUATION
Patient: Yanet Grove    Procedure Summary     Date Anesthesia Start Anesthesia Stop Room / Location    04/17/17 0742 1003 [facility] / [facility]       Procedure Diagnosis Surgeon Provider    Right elbow radical resection capsule soft tissue heterotopic bone with contracture release (Right Elbow) S/P elbow joint replacement, right; Heterotopic ossification; Contracture, elbow, right MD Tru Byrd MD          Anesthesia Type: general  Last vitals  /59 (04/17/17 1000)    Temp 96.8 °F (36 °C) (04/17/17 1000)    Pulse 77 (04/17/17 1000)   Resp 16 (04/17/17 1000)    SpO2 100 % (04/17/17 1000)      Post Anesthesia Care and Evaluation    Patient location during evaluation: PACU  Patient participation: complete - patient participated  Level of consciousness: awake and alert  Pain score: 0  Pain management: adequate  Airway patency: patent  Anesthetic complications: No anesthetic complications  PONV Status: none  Cardiovascular status: hemodynamically stable and acceptable  Respiratory status: nonlabored ventilation, acceptable and nasal cannula  Hydration status: acceptable    Comments: Mt ASSESS NERVE FUNCTON PRIOR TO BLOCK.

## 2017-04-17 NOTE — H&P
[facility] Pre-OP H&P    Chief complaint:  Right Elbow Bone Spur    Subjective:  Patient is a 76 y.o.female presents with history of right comminuted intraarticular distal humerus fracture s/p fall 9/6/16 and s/p right total elbow arthroplasty 9/12/16 (MD Irma). Has now developed a heterotopic ossification to her right elbow limiting her range of motion and causing pain.  She is here today for excision of right elbow heterotopic ossification.     Review of Systems:  General ROS: negative for fever, chills, weakness, dizziness, headache, fatigue, weight changes  Cardiovascular ROS: no chest pain or dyspnea on exertion  Respiratory ROS: no cough, shortness of breath, or wheezing  GI ROS: no abdominal pain/discomfort, nausea, vomiting or diarrhea   ROS: no dysuria, hematuria or complaints  Skin ROS: no itching, rash or open wounds.      Allergies:   Allergies   Allergen Reactions   • Phenergan [Promethazine Hcl] Hives   Latex: no known allergy  Contrast Dye:  no known allergy      Home Meds    Prescriptions Prior to Admission   Medication Sig Dispense Refill Last Dose   • cholecalciferol (VITAMIN D3) 1000 UNITS tablet Take 2,000 Units by mouth daily.   4/16/2017 at 0700   • ferrous sulfate 325 (65 FE) MG tablet Take 325 mg by mouth daily with breakfast.   4/16/2017 at 0700   • Raloxifene HCl (EVISTA PO) Take 60 mg by mouth daily.   Past Week at Unknown time   • SIMVASTATIN PO Take 40 mg by mouth daily.   Past Week at Unknown time   • aspirin 81 MG EC tablet Take 81 mg by mouth Daily.   4/11/2017   • diphenhydrAMINE (NIGHTTIME SLEEP AID) 25 MG tablet Take 25 mg by mouth at night as needed for sleep.   4/15/2017     PMH:   Past Medical History:   Diagnosis Date   • Arthritis    • Chest pain syndrome 9/7/2016   • Dyslipidemia 9/7/2016   • GERD (gastroesophageal reflux disease) 9/7/2016   • Glaucoma 9/7/2016   • Insomnia 9/7/2016   • Osteoporosis    • Polymyalgia rheumatica 9/7/2016   • PONV (postoperative nausea and  "vomiting)     AFTER HYSTERECTOMY   • Renal disorder    • Rotator cuff tear, left    • Wears glasses     READERS     PSH:    Past Surgical History:   Procedure Laterality Date   • CARPAL TUNNEL RELEASE      LEFT   • CATARACT EXTRACTION      BILAT    • COLONOSCOPY      2013   • HYSTERECTOMY      TOTAL   • JOINT REPLACEMENT      KNEE LEFT REPLACEMENT   • ORIF HUMERUS FRACTURE Right 09/12/2016   • REPLACEMENT TOTAL KNEE  2003   • SKIN GRAFT      right foot 2014   • TOTAL ELBOW ARTHROPLASTY Right 09/12/2016   • TOTAL ELBOW ARTHROPLASTY Right 9/12/2016    Procedure: TOTAL ELBOW ARTHROPLASTY; ULNAR NERVE TRANSPOSITION;  Surgeon: Trung Bonilla MD;  Location: [facility];  Service:      Immunization History: pneumonia= yes    Influenza= no    Tetanus= yes, 2016    Social History:  Social History   Substance Use Topics   • Smoking status: Never Smoker   • Smokeless tobacco: Never Used   • Alcohol use No       Physical Exam:/81 (BP Location: Right arm, Patient Position: Lying)  Pulse 69  Temp 96.8 °F (36 °C) (Temporal Artery )   Resp 18  Ht 64\" (162.6 cm)  Wt 146 lb (66.2 kg)  LMP Comment: >55 years old  SpO2 99%  Breastfeeding? No  BMI 25.06 kg/m2      General Appearance:    Alert, cooperative, no distress, appears stated age   Head:    Normocephalic, without obvious abnormality, atraumatic   Lungs:     Clear to auscultation bilaterally, respirations unlabored    Heart: Regular rate and rhythm, S1 and S2 normal, no murmur, rub    or gallop    Abdomen:    Soft without tenderness   Breast Exam:    deferred   Genitalia:    deferred   Extremities:   Extremities normal, atraumatic, no cyanosis or edema   Skin:   Skin color, texture, turgor normal, no rashes or lesions   Neurologic:   Grossly intact     Cancer Patient: __ yes __no _x_unknown; If yes, clinical stage T:__ N:__M:__, stage group    Impression:  Right Elbow Heterotopic Ossification    Plan:  Excision of Right Elbow Heterotopic Ossification     Leonila " SONIA Crenshaw 4/17/2017 6:59 AM

## 2017-04-18 ENCOUNTER — APPOINTMENT (OUTPATIENT)
Dept: OTHER | Facility: HOSPITAL | Age: 77
End: 2017-04-18
Attending: RADIOLOGY

## 2017-04-18 VITALS
DIASTOLIC BLOOD PRESSURE: 56 MMHG | HEART RATE: 89 BPM | BODY MASS INDEX: 24.92 KG/M2 | RESPIRATION RATE: 16 BRPM | OXYGEN SATURATION: 95 % | WEIGHT: 146 LBS | HEIGHT: 64 IN | TEMPERATURE: 98.2 F | SYSTOLIC BLOOD PRESSURE: 135 MMHG

## 2017-04-18 LAB
ANION GAP SERPL CALCULATED.3IONS-SCNC: 7 MMOL/L (ref 3–11)
BASOPHILS # BLD AUTO: 0.03 10*3/MM3 (ref 0–0.2)
BASOPHILS NFR BLD AUTO: 0.3 % (ref 0–1)
BUN BLD-MCNC: 12 MG/DL (ref 9–23)
BUN/CREAT SERPL: 10.9 (ref 7–25)
CALCIUM SPEC-SCNC: 8.6 MG/DL (ref 8.7–10.4)
CHLORIDE SERPL-SCNC: 108 MMOL/L (ref 99–109)
CO2 SERPL-SCNC: 24 MMOL/L (ref 20–31)
CREAT BLD-MCNC: 1.1 MG/DL (ref 0.6–1.3)
DEPRECATED RDW RBC AUTO: 46.9 FL (ref 37–54)
EOSINOPHIL # BLD AUTO: 0.01 10*3/MM3 (ref 0.1–0.3)
EOSINOPHIL NFR BLD AUTO: 0.1 % (ref 0–3)
ERYTHROCYTE [DISTWIDTH] IN BLOOD BY AUTOMATED COUNT: 13.8 % (ref 11.3–14.5)
GFR SERPL CREATININE-BSD FRML MDRD: 48 ML/MIN/1.73
GLUCOSE BLD-MCNC: 103 MG/DL (ref 70–100)
HCT VFR BLD AUTO: 35.2 % (ref 34.5–44)
HGB BLD-MCNC: 11.2 G/DL (ref 11.5–15.5)
IMM GRANULOCYTES # BLD: 0.02 10*3/MM3 (ref 0–0.03)
IMM GRANULOCYTES NFR BLD: 0.2 % (ref 0–0.6)
LYMPHOCYTES # BLD AUTO: 2.31 10*3/MM3 (ref 0.6–4.8)
LYMPHOCYTES NFR BLD AUTO: 19.9 % (ref 24–44)
MCH RBC QN AUTO: 29.4 PG (ref 27–31)
MCHC RBC AUTO-ENTMCNC: 31.8 G/DL (ref 32–36)
MCV RBC AUTO: 92.4 FL (ref 80–99)
MONOCYTES # BLD AUTO: 1.11 10*3/MM3 (ref 0–1)
MONOCYTES NFR BLD AUTO: 9.6 % (ref 0–12)
NEUTROPHILS # BLD AUTO: 8.14 10*3/MM3 (ref 1.5–8.3)
NEUTROPHILS NFR BLD AUTO: 69.9 % (ref 41–71)
PLATELET # BLD AUTO: 248 10*3/MM3 (ref 150–450)
PMV BLD AUTO: 10.4 FL (ref 6–12)
POTASSIUM BLD-SCNC: 4 MMOL/L (ref 3.5–5.5)
RBC # BLD AUTO: 3.81 10*6/MM3 (ref 3.89–5.14)
SODIUM BLD-SCNC: 139 MMOL/L (ref 132–146)
WBC NRBC COR # BLD: 11.62 10*3/MM3 (ref 3.5–10.8)

## 2017-04-18 PROCEDURE — G8978 MOBILITY CURRENT STATUS: HCPCS

## 2017-04-18 PROCEDURE — 77334 RADIATION TREATMENT AID(S): CPT | Performed by: RADIOLOGY

## 2017-04-18 PROCEDURE — 25010000002 BUPRENORPHINE PER 0.1 MG: Performed by: NURSE ANESTHETIST, CERTIFIED REGISTERED

## 2017-04-18 PROCEDURE — G8979 MOBILITY GOAL STATUS: HCPCS

## 2017-04-18 PROCEDURE — 77417 THER RADIOLOGY PORT IMAGE(S): CPT | Performed by: RADIOLOGY

## 2017-04-18 PROCEDURE — 25010000002 DEXAMETHASONE PER 1 MG: Performed by: NURSE ANESTHETIST, CERTIFIED REGISTERED

## 2017-04-18 PROCEDURE — 77307 TELETHX ISODOSE PLAN CPLX: CPT | Performed by: RADIOLOGY

## 2017-04-18 PROCEDURE — G0378 HOSPITAL OBSERVATION PER HR: HCPCS

## 2017-04-18 PROCEDURE — 77290 THER RAD SIMULAJ FIELD CPLX: CPT | Performed by: RADIOLOGY

## 2017-04-18 PROCEDURE — 97161 PT EVAL LOW COMPLEX 20 MIN: CPT

## 2017-04-18 PROCEDURE — 80048 BASIC METABOLIC PNL TOTAL CA: CPT | Performed by: ORTHOPAEDIC SURGERY

## 2017-04-18 PROCEDURE — 99217 PR OBSERVATION CARE DISCHARGE MANAGEMENT: CPT | Performed by: NURSE PRACTITIONER

## 2017-04-18 PROCEDURE — 97530 THERAPEUTIC ACTIVITIES: CPT | Performed by: OCCUPATIONAL THERAPIST

## 2017-04-18 PROCEDURE — 25010000003 CEFAZOLIN IN DEXTROSE 2-4 GM/100ML-% SOLUTION: Performed by: ORTHOPAEDIC SURGERY

## 2017-04-18 PROCEDURE — 77412 RADIATION TX DELIVERY LVL 3: CPT | Performed by: RADIOLOGY

## 2017-04-18 PROCEDURE — 85025 COMPLETE CBC W/AUTO DIFF WBC: CPT | Performed by: ORTHOPAEDIC SURGERY

## 2017-04-18 PROCEDURE — 94799 UNLISTED PULMONARY SVC/PX: CPT

## 2017-04-18 PROCEDURE — G8980 MOBILITY D/C STATUS: HCPCS

## 2017-04-18 RX ORDER — ROPIVACAINE HYDROCHLORIDE 2 MG/ML
6 INJECTION, SOLUTION EPIDURAL; INFILTRATION CONTINUOUS
Start: 2017-04-18 | End: 2017-05-17 | Stop reason: HOSPADM

## 2017-04-18 RX ORDER — SENNA AND DOCUSATE SODIUM 50; 8.6 MG/1; MG/1
2 TABLET, FILM COATED ORAL 2 TIMES DAILY
Start: 2017-04-18 | End: 2017-07-12

## 2017-04-18 RX ORDER — BUPRENORPHINE HYDROCHLORIDE 0.32 MG/ML
INJECTION INTRAMUSCULAR; INTRAVENOUS AS NEEDED
Status: DISCONTINUED | OUTPATIENT
Start: 2017-04-17 | End: 2017-04-18 | Stop reason: SURG

## 2017-04-18 RX ORDER — DEXAMETHASONE SODIUM PHOSPHATE 10 MG/ML
INJECTION INTRAMUSCULAR; INTRAVENOUS AS NEEDED
Status: DISCONTINUED | OUTPATIENT
Start: 2017-04-18 | End: 2017-04-18 | Stop reason: SURG

## 2017-04-18 RX ORDER — HYDROCODONE BITARTRATE AND ACETAMINOPHEN 10; 325 MG/1; MG/1
1 TABLET ORAL EVERY 4 HOURS PRN
Qty: 50 TABLET | Refills: 0 | Status: SHIPPED | OUTPATIENT
Start: 2017-04-18 | End: 2017-04-27

## 2017-04-18 RX ORDER — BISACODYL 5 MG/1
10 TABLET, DELAYED RELEASE ORAL DAILY PRN
Refills: 0
Start: 2017-04-18 | End: 2017-07-12

## 2017-04-18 RX ORDER — DEXAMETHASONE SODIUM PHOSPHATE 10 MG/ML
INJECTION, SOLUTION INTRAMUSCULAR; INTRAVENOUS AS NEEDED
Status: DISCONTINUED | OUTPATIENT
Start: 2017-04-17 | End: 2017-04-18 | Stop reason: SURG

## 2017-04-18 RX ORDER — BUPRENORPHINE HYDROCHLORIDE 0.32 MG/ML
INJECTION INTRAMUSCULAR; INTRAVENOUS AS NEEDED
Status: DISCONTINUED | OUTPATIENT
Start: 2017-04-18 | End: 2017-04-18 | Stop reason: SURG

## 2017-04-18 RX ADMIN — HYDROCODONE BITARTRATE AND ACETAMINOPHEN 1 TABLET: 10; 325 TABLET ORAL at 10:10

## 2017-04-18 RX ADMIN — DEXAMETHASONE SODIUM PHOSPHATE 4 MG: 10 INJECTION INTRAMUSCULAR; INTRAVENOUS at 15:15

## 2017-04-18 RX ADMIN — CEFAZOLIN SODIUM 2 G: 2 INJECTION, SOLUTION INTRAVENOUS at 00:54

## 2017-04-18 RX ADMIN — Medication 2 TABLET: at 08:46

## 2017-04-18 RX ADMIN — BUPRENORPHINE HYDROCHLORIDE 0.3 MG: 0.3 INJECTION INTRAMUSCULAR; INTRAVENOUS at 15:15

## 2017-04-18 NOTE — PLAN OF CARE
Problem: Patient Care Overview (Adult)  Goal: Plan of Care Review  Outcome: Ongoing (interventions implemented as appropriate)    04/18/17 1631   Coping/Psychosocial Response Interventions   Plan Of Care Reviewed With patient   Outcome Evaluation   Outcome Summary/Follow up Plan Pt tolerates ambulation with stand by assist. Pain controlled with lortab. Nerve block D/C'd prior to D/C. Pt received radiation this afternoon.    Patient Care Overview   Progress improving         Problem: Perioperative Period (Adult)  Goal: Signs and Symptoms of Listed Potential Problems Will be Absent or Manageable (Perioperative Period)  Outcome: Ongoing (interventions implemented as appropriate)    04/18/17 1631   Perioperative Period   Problems Assessed (Perioperative Period) all   Problems Present (Perioperative Period) pain

## 2017-04-18 NOTE — PROGRESS NOTES
Discharge Planning Assessment  [facility]     Patient Name: Yanet Groev  MRN: [id number]  Today's Date: 4/18/2017    Admit Date: 4/17/2017          Discharge Needs Assessment       04/18/17 1014    Living Environment    Lives With spouse    Living Arrangements house    Discharge Needs Assessment    Concerns To Be Addressed denies needs/concerns at this time;discharge planning concerns    Readmission Within The Last 30 Days no previous admission in last 30 days    Equipment Currently Used at Home cane, straight;walker, rolling   Has cane and rolling walker but doesnt use them    Transportation Available car;family or friend will provide    Discharge Disposition still a patient    Discharge Contact Information if Applicable Sean Grove, son, [phone]            Discharge Plan       04/18/17 1016    Case Management/Social Work Plan    Plan Home    Patient/Family In Agreement With Plan yes    Additional Comments Met with patient and her son, Sean, in the room to initiate disharge planning. Patient lives with her  in a house in [facility]. She is independent with ADLs and has a straight cane available if she needs it. Her goal is home at discharge and she denies any need for equipment or home health. CM mentioned getting a raised toilet and a quad cane as a safety precaution and explained that these items are self-pay. Patient has good family support, and the family will be caring for her and her , who is currently admitted at [facility] for his own health issues. She plans on pursuing outpatient PT at Dr. Bonilla's direction. Family will transport at [place]. CM continues to follow.        Discharge Placement     No information found        Expected Discharge Date and Time     Expected Discharge Date Expected Discharge Time    Apr 18, 2017               Demographic Summary       04/18/17 1013    Referral Information    Referral Source admission list    Reason For Consult discharge planning    Contact  Information    Permission Granted to Share Information With ;family/designee   sonSean    Primary Care Physician Information    Name Darin Baugh            Functional Status       04/18/17 1014    Functional Status Prior    Ambulation 0-->independent    Transferring 0-->independent    Toileting 0-->independent    Bathing 0-->independent    Dressing 0-->independent    Eating 0-->independent    Communication 0-->understands/communicates without difficulty    Swallowing 0-->swallows foods/liquids without difficulty    Employment/Financial    Employment/Finance Comments Medical and rx coverage through [employer]; uses [employer] in [place]            Psychosocial     None            Abuse/Neglect     None            Legal     None            Substance Abuse     None            Patient Forms     None          Camila Sanchez

## 2017-04-18 NOTE — PLAN OF CARE
Problem: Patient Care Overview (Adult)  Goal: Plan of Care Review  Outcome: Ongoing (interventions implemented as appropriate)    04/18/17 0524   Coping/Psychosocial Response Interventions   Plan Of Care Reviewed With patient   Patient Care Overview   Progress progress toward functional goals as expected         Problem: Perioperative Period (Adult)  Goal: Signs and Symptoms of Listed Potential Problems Will be Absent or Manageable (Perioperative Period)  Outcome: Ongoing (interventions implemented as appropriate)    04/18/17 0524   Perioperative Period   Problems Assessed (Perioperative Period) all   Problems Present (Perioperative Period) pain

## 2017-04-18 NOTE — PROGRESS NOTES
[facility]    Acute pain service Inpatient Progress Note    Patient Name: Yanet Grove  :  1940  MRN:  [id number]        Acute Pain  Service Inpatient Progress Note:    Analgesia:Excellent  Pain Score:0/10  LOC: alert and awake  Resp Status: room air  Cardiac: VS stable  Side Effects:None  Catheter Site:clean and dressing intact  Cath type: peripheral nerve cath(MOOG pump)  Infusion rate: 6ml/hr  Catheter Plan:Catheter to remain Insitu and Continue catheter infusion rate unchanged  Comments: Doing well. No complaints. Moves fingers.

## 2017-04-18 NOTE — PROGRESS NOTES
Orthopedic Daily Progress Note      CC: post-op     Pain well controlled  General: no fevers, chills  Abdomen: No nausea, vomiting, or diarrhea    No other complaints    Physical Exam:  I have reviewed the vital signs.  Temp:  [96.8 °F (36 °C)-98.8 °F (37.1 °C)] 98.8 °F (37.1 °C)  Heart Rate:  [62-90] 90  Resp:  [14-16] 16  BP: (112-175)/(49-96) 119/55    Objective  General Appearance:    Alert, cooperative, no distress  Extremities: No clubbing, cyanosis, or edema to lower extremities  Pulses:  2+ in distal surgical extremity  Skin: Dressing Clean/dry/intact      Results Review:    I have reviewed the labs, radiology results and diagnostic studies:Yes      Results from last 7 days  Lab Units 04/18/17  0540   WBC 10*3/mm3 11.62*   HEMOGLOBIN g/dL 11.2*   PLATELETS 10*3/mm3 248       Results from last 7 days  Lab Units 04/18/17  0540   SODIUM mmol/L 139   POTASSIUM mmol/L 4.0   TOTAL CO2 mmol/L 24.0   CREATININE mg/dL 1.10   GLUCOSE mg/dL 103*       I have reviewed the medications.    Assessment/Problem List  POD# 1 S/p heterotopic ossification excision and capsular release of right elbow    Plan  - Abx x 23h post-op  -PT for work with ROM  - HO prophylaxis XRT today  - Will have pain team re-dose block today        Discharge Planning: I expect patient to be discharged to home today after XRT.     Eduard Christopher MD  04/18/17  7:57 AM

## 2017-04-18 NOTE — ADDENDUM NOTE
Addendum  created 04/18/17 1526 by Bryan Brand CRNA    Anesthesia Intra Meds edited, LDA removed

## 2017-04-18 NOTE — CONSULTS
Inpatient Consult to Radiation Oncology  Consult performed by: TIFFANI AUSTIN  Consult ordered by: JUDSON THOMPSON          Patient Care Team:  Darin Baugh MD as PCP - General (Family Medicine)  Darin Lee MD as Consulting Physician (Cardiology)  Shon Chan MD as Consulting Physician (Nephrology)    Chief complaint: Heterotopic bone formation    Subjective     HPI Comments: Patient underwent right elbow revision surgery to remove heterotopic bone formation around joint replacement which had limited her mobility and use of that extremity.  She had suffered a fall 6 months earlier with fracture of the distal humerus.  She undergone total joint replacement.  Physical therapy was not able to free up mobility of the heterotopic bone around the distal anterior/medial humerus extending towards the ulna.  This area was removed and freed up today surgically.  She appears her tolerated the procedure well and is in good spirits but has numbness of the right arm.      Review of Systems   Constitutional: Positive for activity change. Negative for fatigue.   Cardiovascular: Negative for leg swelling.   Musculoskeletal: Positive for arthralgias and joint swelling.   Neurological: Positive for weakness and numbness (right upper extremity).   Hematological: Negative for adenopathy.        Past Medical History:   Diagnosis Date   • Arthritis    • Chest pain syndrome 9/7/2016   • Dyslipidemia 9/7/2016   • GERD (gastroesophageal reflux disease) 9/7/2016   • Glaucoma 9/7/2016   • Insomnia 9/7/2016   • Osteoporosis    • Polymyalgia rheumatica 9/7/2016   • PONV (postoperative nausea and vomiting)     AFTER HYSTERECTOMY   • Renal disorder    • Rotator cuff tear, left    • Wears glasses     READERS   ,   Prescriptions Prior to Admission   Medication Sig Dispense Refill Last Dose   • aspirin 81 MG EC tablet Take 81 mg by mouth Daily.   4/16/2017 at Unknown time   • cholecalciferol (VITAMIN D3) 1000 UNITS tablet  "Take 2,000 Units by mouth daily.   4/16/2017   • ferrous sulfate 325 (65 FE) MG tablet Take 325 mg by mouth daily with breakfast.   4/16/2017 at 0700   • Raloxifene HCl (EVISTA PO) Take 60 mg by mouth daily.   Past Week at Unknown time   • SIMVASTATIN PO Take 40 mg by mouth daily.   Past Week at Unknown time   • diphenhydrAMINE (NIGHTTIME SLEEP AID) 25 MG tablet Take 25 mg by mouth at night as needed for sleep.   4/15/2017    and Allergies:  Phenergan [promethazine hcl]    Objective      Vital Signs  /62 (BP Location: Left arm, Patient Position: Lying)  Pulse 78  Temp 98.1 °F (36.7 °C) (Oral)   Resp 16  Ht 64\" (162.6 cm)  Wt 146 lb (66.2 kg)  LMP Comment: >55 years old  SpO2 97%  Breastfeeding? No  BMI 25.06 kg/m2    Physical Exam   Constitutional: She is oriented to person, place, and time. She appears well-developed and well-nourished.   HENT:   Head: Normocephalic and atraumatic.   Neck: Normal range of motion. Neck supple.   Cardiovascular: Normal rate, regular rhythm and normal heart sounds.    No murmur heard.  Pulmonary/Chest: Effort normal and breath sounds normal. She has no wheezes. She has no rales.   Abdominal: Soft. Bowel sounds are normal. She exhibits no distension. There is no hepatosplenomegaly. There is no tenderness.   Musculoskeletal: Normal range of motion. She exhibits deformity (Numbness and dressing over the right elbow still limits mobility but the joint is apparently freed up.  There is some serosanguineous drainage saturating the dressing posteriorly.). She exhibits no edema or tenderness.   Lymphadenopathy:     She has no cervical adenopathy.     She has no axillary adenopathy.        Right: No supraclavicular adenopathy present.        Left: No supraclavicular adenopathy present.   Neurological: She is alert and oriented to person, place, and time. She has normal strength. No sensory deficit.   Skin: Skin is warm and dry.   Psychiatric: She has a normal mood and affect. " Her behavior is normal. Judgment and thought content normal.   Nursing note and vitals reviewed.      Results Review:    I reviewed the patient's new clinical results.  I reviewed the patient's new imaging results and agree with the interpretation.        Assessment/Plan     Principal Problem:    Heterotopic ossification  Active Problems:    Dyslipidemia    CKD (chronic kidney disease) stage 3, GFR 30-59 ml/min      Assessment:  (Heterotopic bone formation right elbow after distal humerus fracture and total joint replacement 6 months ago.  She is now status post revision surgery with resection of heterotopic bone.  She has not previously been able to tolerate steroids.).     Plan:   (Adjuvant radiotherapy is indicated to reduce the risk of recurrent heterotopic bone formation and to hopefully maintain mobility.  Radiotherapy is typically administered within 72-96 hours after surgery.  We will likely treat tomorrow to deliver single fraction of 7 Gray to the right elbow region of the previous heterotopic bone.  I will try to get copies of previous radiographs showing location of bone and confirmed with Dr. Bonilla.  We'll try to avoid the incision in the direct path the mean.    ).       I discussed the patients findings and my recommendations with patient, family and primary care team    Daniel Tineo MD  04/17/17  9:13 PM

## 2017-04-18 NOTE — PLAN OF CARE
Problem: Patient Care Overview (Adult)  Goal: Plan of Care Review  Outcome: Ongoing (interventions implemented as appropriate)    04/18/17 0938   Coping/Psychosocial Response Interventions   Plan Of Care Reviewed With patient   Outcome Evaluation   Outcome Summary/Follow up Plan Pt crystal VASQUEZ R elbow  with c/o 3/10 pain. DC disposition unclear at this time as spouse is hospitalized. Recommend IP rehab, pt continues to decline stating she needs to go home to care for spouse. If pt DC home today, OT will see for visit on dressing and care of On-Q ball with ADLS.    Patient Care Overview   Progress improving         Problem: Inpatient Occupational Therapy  Goal: Transfer Training Goal 1 LTG- OT  Outcome: Ongoing (interventions implemented as appropriate)    04/17/17 1824 04/18/17 0938   Transfer Training OT LTG   Transfer Training OT LTG, Date Established 04/17/17 --    Transfer Training OT LTG, Time to Achieve by discharge --    Transfer Training OT LTG, Activity Type sit to stand/stand to sit;toilet --    Transfer Training OT LTG, [place] Level verbal cues required;supervision required --    Transfer Training OT LTG, Assist Device (AD PRN) --    Transfer Training OT LTG, Outcome --  goal ongoing       Goal: Range of Motion Goal LTG- OT  Outcome: Outcome(s) achieved Date Met:  04/18/17 04/17/17 1824 04/18/17 0938   Range of Motion OT LTG   Range of Motion Goal OT LTG, Date Established 04/17/17 --    Range of Motion Goal OT LTG, Time to Achieve by discharge --    Range of Motion Goal OT LTG, AROM Measure Pt will compelte RUE HEP with supervision in preparation for safe DC home.  --    Range of Motion Goal OT LTG, Outcome --  goal met       Goal: Patient Education Goal LTG- OT  Outcome: Ongoing (interventions implemented as appropriate)    04/17/17 1824 04/18/17 0938   Patient Education OT LTG   Patient Education OT LTG, Date Established 04/17/17 --    Patient Education OT LTG, Time to Achieve by discharge  --    Patient Education OT LTG, Education Type written program;precautions per surgeon;1 hand/maurice technique;home safety --    Patient Education OT LTG, Education Understanding demonstrates adequately;verbalizes understanding --    Patient Education OT LTG Outcome --  goal ongoing       Goal: UB Dressing Goal LTG- OT  Outcome: Ongoing (interventions implemented as appropriate)    04/17/17 1824 04/18/17 0938   UB Dressing OT LTG   UB Dressing Goal OT LTG, Date Established 04/17/17 --    UB Dressing Goal OT LTG, Time to Achieve by discharge --    UB Dressing Goal OT LTG, Activity Type Pt will complete UB dressing task --    UB Dressing Goal OT LTG, [place] Level minimum assist (75% patient effort);verbal cues required --    UB Dressing Goal OT LTG, Outcome --  goal ongoing

## 2017-04-18 NOTE — THERAPY DISCHARGE NOTE
Acute Care - Physical Therapy Initial Eval/Discharge  [facility]     Patient Name: Yanet Grove  : 1940  MRN: [id number]  Today's Date: 2017   Onset of Illness/Injury or Date of Surgery Date: 17  Date of Referral to PT: 17  Referring Physician: MD Chad      Admit Date: 2017    Visit Dx:    ICD-10-CM ICD-9-CM   1. Impaired mobility and ADLs Z74.09 799.89   2. Impaired functional mobility, balance, gait, and endurance Z74.09 V49.89     Patient Active Problem List   Diagnosis   • Chest pain syndrome   • Insomnia   • Dyslipidemia   • Glaucoma   • Polymyalgia rheumatica   • Closed fracture of right distal humerus   • CKD (chronic kidney disease) stage 3, GFR 30-59 ml/min   • Impaired functional mobility, balance, gait, and endurance   • Heterotopic ossification     Past Medical History:   Diagnosis Date   • Arthritis    • Chest pain syndrome 2016   • Dyslipidemia 2016   • GERD (gastroesophageal reflux disease) 2016   • Glaucoma 2016   • Insomnia 2016   • Osteoporosis    • Polymyalgia rheumatica 2016   • PONV (postoperative nausea and vomiting)     AFTER HYSTERECTOMY   • Renal disorder    • Rotator cuff tear, left    • Wears glasses     READERS     Past Surgical History:   Procedure Laterality Date   • CARPAL TUNNEL RELEASE      LEFT   • CATARACT EXTRACTION      BILAT    • COLONOSCOPY         • ELBOW ARTHROTOMY Right 2017    Procedure: Right elbow radical resection capsule soft tissue heterotopic bone with contracture release;  Surgeon: Trung Bonilla MD;  [facility];  Service:    • HYSTERECTOMY      TOTAL   • JOINT REPLACEMENT      KNEE LEFT REPLACEMENT   • ORIF HUMERUS FRACTURE Right 2016   • REPLACEMENT TOTAL KNEE     • SKIN GRAFT      right foot    • TOTAL ELBOW ARTHROPLASTY Right 2016   • TOTAL ELBOW ARTHROPLASTY Right 2016    Procedure: TOTAL ELBOW ARTHROPLASTY; ULNAR NERVE TRANSPOSITION;  Surgeon: Trung  Blaise Bonilla MD;  [facility];  Service:           PT ASSESSMENT (last 72 hours)      PT Evaluation       04/18/17 1445 04/18/17 1122    Rehab Evaluation    Document Type evaluation;discharge summary  -MJ     Subjective Information no complaints;agree to therapy  -MJ     Evaluation Not Performed  patient unavailable for evaluation   Pt going for radiation tx, will check back after lunch for initial eval  -MJ    Patient Effort, Rehab Treatment good  -MJ     Symptoms Noted During/After Treatment none  -MJ     General Information    Patient Profile Review yes  -MJ     Onset of Illness/Injury or Date of Surgery Date 04/17/17  -MJ     Referring Physician MD Chad  -MJ     General Observations Pt sitting UIC, R UE nerve catheter, IV heplocked, R UE elevated  -MJ     Pertinent History Of Current Problem Pt presents for surgical management of R elbow pain and dysfunction that failed to improve with conservative management  -MJ     Precautions/Limitations fall precautions;non-weight bearing status   NWB R UE  -MJ     Prior Level of Function independent:;all household mobility;community mobility;gait;transfer;bed mobility  -MJ     Equipment Currently Used at Home cane, straight;walker, rolling;commode   owns, was not using  -MJ     Plans/Goals Discussed With patient and family;agreed upon  -MJ     Risks Reviewed patient and family:;LOB;increased discomfort  -MJ     Benefits Reviewed patient and family:;improve function;increase independence;increase strength;increase balance;decrease pain  -MJ     Barriers to Rehab none identified  -MJ     Living Environment    Lives With spouse  -MJ     Living Arrangements house  -MJ     Home Accessibility stairs to enter home  -MJ     Number of Stairs to Enter Home 1  -MJ     Clinical Impression    Date of Referral to PT 04/18/17  -MJ     PT Diagnosis s/p R elbow arthrotomy  -MJ     Criteria for Skilled Therapeutic Interventions Met no;no problems identified which require  skilled intervention  -     Pain Assessment    Pain Assessment No/denies pain  -     Cognitive Assessment/Intervention    Current Cognitive/Communication Assessment functional  -     Orientation Status oriented x 4  -MJ     Follows Commands/Answers Questions 100% of the time;able to follow multi-step instructions;needs cueing  -     Personal Safety WNL/WFL  -     ROM (Range of Motion)    General ROM no range of motion deficits identified  -     General ROM Detail for B-L LE; defer UE to OT  -     MMT (Manual Muscle Testing)    General MMT Assessment no strength deficits identified  -     General MMT Assessment Detail for B-L LE; defer UE to OT  -     Mobility Assessment/Training    Right Upper Extremity Weight-Bearing non weight-bearing  -     Bed Mobility, Assessment/Treatment    Bed Mob, Supine to Sit, [place] not tested   Pt UIC  -     Bed Mob, Sit to Supine, [place] not tested   Pt UI  -     Transfer Assessment/Treatment    Transfers, Sit-Stand [place] independent  -     Transfers, Stand-Sit [place] independent  -     Gait Assessment/Treatment    Gait, [place] Level supervision required  -     Gait, Assistive Device --   none  -     Gait, Distance (Feet) 200  -     Gait, Gait Pattern Analysis swing-through gait  -     Gait, Comment Pt demo step through gait pattern at slow pace. Pt holds R UE with L UE. Gait limited by fatigue  -     Sensory Assessment/Intervention    Light Touch LLE;RLE  -     LLE Light Touch WNL  -     RLE Light Touch WNL  -     Positioning and Restraints    Pre-Treatment Position sitting in chair/recliner  -     Post Treatment Position chair  -     In Chair notified nsg;reclined;call light within reach;encouraged to call for assist;with family/caregiver  -       04/18/17 1014 04/18/17 0846    General Information    Equipment Currently Used at Home cane, straight;walker, rolling   Has cane and rolling walker but doesnt  use them  -ML     Living Environment    Lives With spouse  -ML     Living Arrangements house  -ML     Transportation Available car;family or friend will provide  -ML     Muscle Tone Assessment    RUE Muscle Tone Assessment  mildly decreased tone  -KW    Sensory Assessment/Intervention    Light Touch  --   pt denies numbness or tingling  -KW      04/18/17 0757 04/17/17 1610    Rehab Evaluation    Document Type therapy note (daily note)   POD#1  -AR evaluation  -AR    Subjective Information agree to therapy  -AR no complaints;agree to therapy  -AR    Patient Effort, Rehab Treatment good  -AR good  -AR    Symptoms Noted During/After Treatment none  -AR none  -AR    General Information    Patient Profile Review  yes  -AR    Onset of Illness/Injury or Date of Surgery Date  04/17/17  -AR    Referring Physician  Dr. Bonilla  -AR    General Observations  Pt up in chair, no family at bedside and RUE elevated  -AR    Pertinent History Of Current Problem  Pt is a 76 yof h/o R comminuted intraarticular distal humerus FX s/p fall 9/6/16 with resultant right TEA 9/12/16. 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 Pt is POD#0 resection of heterotopic ossification and anterior and posterior capsular release with resection of capsule. OT orders indicate agressive R elbow ROM, and NWB RUE. Pt is R-hand dominant. Prior to admission, she reports pain and difficulty with self-feeding, grooming, completing UB ADLS and pain interrupted sleep at night.    -AR    Precautions/Limitations fall precautions;non-weight bearing status   infraclavicular nerve catheter  -AR fall precautions;non-weight bearing status;other (see comments)   infraclavicular nerve catheter  -AR    Prior Level of Function  independent:;all household mobility;community mobility;gait;transfer;min assist:;ADL's;home  management;cooking;cleaning;driving;shopping  -AR    Equipment Currently Used at Home  cane, straight;commode  -AR    Plans/Goals Discussed With  patient;agreed upon  -AR    Risks Reviewed  patient:;LOB;nausea/vomiting;dizziness;increased discomfort;change in vital signs;increased drainage;lines disloged  -AR    Benefits Reviewed  patient:;increase independence;increase strength;increase balance;decrease risk of DVT;increase knowledge  -AR    Barriers to Rehab  family issues  -AR    Living Environment    Lives With  spouse  -AR    Living Arrangements  house  -AR    Home Accessibility  no concerns  -AR    Transportation Available  car;family or friend will provide  -AR    Living Environment Comment  Pt lives with her spouse who is curently hospitalized at [facility] and unable to assist. Pt reports h/o several recent falls and has nodule of face from recent fall. No one is available to assist her at home.   -AR    Pain Assessment    Pain Assessment 0-10  -AR No/denies pain  -AR    Pain Score 3  -AR     Post Pain Score 3  -AR     Pain Type Acute pain  -AR     Pain Location Elbow  -AR     Pain Orientation Anterior  -AR     Pain Intervention(s) Repositioned;Ambulation/increased activity  -AR     Response to Interventions tolerated  -AR     Vision Assessment/Intervention    Visual Impairment  WFL with corrective lenses  -AR    Vision Comment  --   glasses unavailable, at pt's home  -AR    Cognitive Assessment/Intervention    Current Cognitive/Communication Assessment functional  -AR functional  -AR    Orientation Status oriented x 4  -AR oriented x 4  -AR    Follows Commands/Answers Questions 100% of the time;able to follow single-step instructions  -% of the time  -AR    Personal Safety mild impairment;decreased awareness, need for safety  -AR mild impairment;impulsive;decreased awareness, need for safety  -AR    Personal Safety Interventions fall prevention program maintained  -AR fall prevention program maintained  -AR     Additional Documentation  --   Exit alarm placed, RN aware  -AR    ROM (Range of Motion)    General ROM  --   LUE WFL, RUE impaired thoroughout  -AR    MMT (Manual Muscle Testing)    General MMT Assessment  other (see comments)   LUE WFL, RUE deferred  -AR    Mobility Assessment/Training    Extremity Weight-Bearing Status  right upper extremity  -AR    Right Upper Extremity Weight-Bearing  non weight-bearing  -AR    Bed Mobility, Assessment/Treatment    Bed Mobility, Comment not observed, pt UIC  -AR Not observed, pt UIC. Educated her on NWB RUE and importance of maintaining during bed mobility activities.  -AR    Transfer Assessment/Treatment    Transfers, Sit-Stand [place]  contact guard assist;verbal cues required  -AR    Transfers, Stand-Sit [place]  contact guard assist;verbal cues required  -AR    Transfers, Sit-Stand-Sit, Assist Device  other (see comments)   LUE support  -AR    Toilet Transfer, [place]  minimum assist (75% patient effort);verbal cues required  -AR    Transfer, Impairments  ROM decreased;impaired balance;other (see comments)   poor overall safety awareness  -AR    Transfer, Comment  Pt hesitant to allow OT to use gait belt. Pt reports h/o falls but does not want assist with mobility. Poor awareness of lines during transfer and pt standing prior to OT donning gait belt despite therapist asking her ot wait for application of belt prior to standing. Educated pt on importance of being aware of environment and attending to RUE as not to bump it on doorways or fixed object and to avoid reaching back for chair and putting weight through operated leg. Pt with decreased attention to therapists instruction.    -AR    Therapy Exercises    Right Upper Extremity AROM:;10 reps;sitting;hand pumps;pronation/supination;AAROM:;elbow flexion/extension   AAROM elbow  following sustained stretching  -AR AROM:;hand pumps;AAROM:;10 reps;sitting;elbow flexion/extension;pronation/supination    tolerated AAROM elbow .   -AR    Exercise Protocols --   pt demo BEBE HEP with supervision  -AR --   Issued and reviewed RUHAYLEE HEP, ed on pendulum for shoulder PRN  -AR    Sensory Assessment/Intervention    Sensory Impairment  numbness   RUE from block  -AR    Edema Management    Edema Amount  moderate  -AR    Skin/Derm Type  right:  -AR    Edema Interventions  education;elevation  -AR    General Interventions    Transfer Training  educated pt on safe transfer technique and maintaining NWB RUE  -AR    Positioning and Restraints    Pre-Treatment Position sitting in chair/recliner  -AR sitting in chair/recliner  -AR    Post Treatment Position chair  -AR chair  -AR    In Chair reclined;call light within reach;encouraged to call for assist;exit alarm on;notified nsg;RUE elevated  -AR reclined;call light within reach;encouraged to call for assist;exit alarm on;with nsg;RUE elevated;compression device  -AR      04/17/17 1135 04/17/17 0623    General Information    Equipment Currently Used at Home  none  -RS    Living Environment    Lives With  spouse  -RS    Living Arrangements  house  -RS    Home Accessibility  no concerns  -RS    Stair Railings at Home  none  -RS    Type of Financial/Environmental Concern  none  -RS    Transportation Available  car;family or friend will provide  -RS    Muscle Tone Assessment    Muscle Tone Assessment Bilateral Upper Extremities;Bilateral Lower Extremities  -LC     Bilateral Upper Extremities Muscle Tone Assessment mildly decreased tone  -LC     Bilateral Lower Extremities Muscle Tone Assessment mildly decreased tone  -LC       User Key  (r) = Recorded By, (t) = Taken By, (c) = Cosigned By    Initials Name Provider Type    AR Tess Goldstein, OT Occupational Therapist    ML Camila Sanchez     NANCI Johansen, PT Physical Therapist    LC Jacquie Deng, RN Registered Nurse    KW Jaja Vera, RN Registered Nurse    RS Alisa Murphy, RN Registered Nurse           Physical Therapy Education     Title: PT OT SLP Therapies (Done)     Topic: Physical Therapy (Done)     Point: Mobility training (Done)    Learning Progress Summary    Learner Readiness Method Response Comment Documented by Status   Patient Acceptance E,D JESSICA   04/18/17 1506 Done   Family Acceptance E,D JESSICA   04/18/17 1506 Done               Point: Home exercise program (Done)    Learning Progress Summary    Learner Readiness Method Response Comment Documented by Status   Patient Acceptance E,D JESSICA   04/18/17 1506 Done   Family Acceptance E,D VU   04/18/17 1506 Done               Point: Body mechanics (Done)    Learning Progress Summary    Learner Readiness Method Response Comment Documented by Status   Patient Acceptance E,D JESSICA   04/18/17 1506 Done   Family Acceptance E,D JESSICA   04/18/17 1506 Done               Point: Precautions (Done)    Learning Progress Summary    Learner Readiness Method Response Comment Documented by Status   Patient Acceptance E,D JESSICA   04/18/17 1506 Done   Family Acceptance E,D JESSICA   04/18/17 1506 Done                      User Key     Initials Effective Dates Name Provider Type Discipline     03/14/16 -  Leonel Johansen PT Physical Therapist PT                PT Recommendation and Plan  Anticipated Discharge Disposition: home with assist  PT Frequency: evaluation only  Plan of Care Review  Plan Of Care Reviewed With: patient  Outcome Summary/Follow up Plan: PT eval completed. Pt ambulated 200 feet with supervision. Pt is discharging home today with assist, had no further questions/concerns about discharge. No goals established as discharge occurs same day as eval              Outcome Measures       04/18/17 1445 04/18/17 0757 04/17/17 1610    How much help from another person do you currently need...    Turning from your back to your side while in flat bed without using bedrails? 3  -MJ      Moving from lying on back to sitting on the side of a flat bed without bedrails? 3   -MJ      Moving to and from a bed to a chair (including a wheelchair)? 4  -MJ      Standing up from a chair using your arms (e.g., wheelchair, bedside chair)? 4  -MJ      Climbing 3-5 steps with a railing? 3  -MJ      To walk in hospital room? 3  -MJ      AM-PAC 6 Clicks Score 20  -MJ      How much help from another is currently needed...    Putting on and taking off regular lower body clothing?  2  -AR 2  -AR    Bathing (including washing, rinsing, and drying)  2  -AR 2  -AR    Toileting (which includes using toilet bed pan or urinal)  3  -AR 3  -AR    Putting on and taking off regular upper body clothing  2  -AR 2  -AR    Taking care of personal grooming (such as brushing teeth)  3  -AR 3  -AR    Eating meals  3  -AR 3  -AR    Score  15  -AR 15  -AR    Functional Assessment    Outcome Measure Options AM-PAC 6 Clicks Basic Mobility (PT)  -MJ AM-PAC 6 Clicks Daily Activity (OT)  -AR AM-PAC 6 Clicks Daily Activity (OT)  -AR      User Key  (r) = Recorded By, (t) = Taken By, (c) = Cosigned By    Initials Name Provider Type    AR Tess Goldstein, OT Occupational Therapist    NANCI Johansen PT Physical Therapist           Time Calculation:         PT Charges       04/18/17 1508          Time Calculation    Start Time 1445  -MJ      PT Received On 04/18/17  -MJ      Time Calculation- PT    Total Timed Code Minutes- PT 0 minute(s)  -        User Key  (r) = Recorded By, (t) = Taken By, (c) = Cosigned By    Initials Name Provider Type    NANCI Johansen PT Physical Therapist          Therapy Charges for Today     Code Description Service Date Service Provider Modifiers Qty    82878421387 HC PT MOBILITY CURRENT 4/18/2017 Leonel Johansen PT GP, CJ 1    12491947537 HC PT MOBILITY PROJECTED 4/18/2017 Leonel Johansen PT GP, CJ 1    13150934703 HC PT MOBILITY DISCHARGE 4/18/2017 Leonel Johansen PT GP, CJ 1    54992135143 HC PT EVAL LOW COMPLEXITY 3 4/18/2017 Leonel Johansen, PT GP 1          PT G-Codes  PT Professional Judgement Used?:  Yes  Outcome Measure Options: AM-PAC 6 Clicks Basic Mobility (PT)  Score: 20  Functional Limitation: Mobility: Walking and moving around  Mobility: Walking and Moving Around Current Status (): At least 20 percent but less than 40 percent impaired, limited or restricted  Mobility: Walking and Moving Around Goal Status (): At least 20 percent but less than 40 percent impaired, limited or restricted  Mobility: Walking and Moving Around Discharge Status (): At least 20 percent but less than 40 percent impaired, limited or restricted    PT Discharge Summary  Anticipated Discharge Disposition: home with assist    Leonel Johansen, PT  4/18/2017

## 2017-04-18 NOTE — PLAN OF CARE
Problem: Patient Care Overview (Adult)  Goal: Plan of Care Review  Outcome: Ongoing (interventions implemented as appropriate)    04/18/17 1506   Coping/Psychosocial Response Interventions   Plan Of Care Reviewed With patient   Outcome Evaluation   Outcome Summary/Follow up Plan PT eval completed. Pt ambulated 200 feet with supervision. Pt is discharging home today with assist, had no further questions/concerns about discharge. No goals established as discharge occurs same day as eval

## 2017-04-18 NOTE — DISCHARGE SUMMARY
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     Presenting Problem/History of Present Illness  Heterotopic ossification [M89.8X9]     Chief Complaint on Day of Discharge:   Patient states that she was feeling fine until Dr. Bonilla came in and manipulate her arm.  He put in request to have anesthesia come re-dose her pain management catheter.  Patient will have radiation therapy this afternoon on her elbow so that she does not have overgrowth of the bone, since that is what caused the contracture last time.  Patient states she is ready to be discharged home.    Hospital Course  Mrs. Gorve is a 76-year-old female who had a heterotropic ossification and had to have a right elbow radical resection with contracture release.  The patient was admitted on 4/17/17 by Dr. Bonilla to have the procedure performed.  He kept the patient overnight due to the pain pump and not having any body at home to help her, as well as, having to have some radiation therapy to the right elbow.  Patient is complaining of some pain in her right arm/elbow after her elbow was manipulated by Dr. Bonilla.  Patient will need home PT/OT at discharge.  Patient will be given prescriptions for pain medicine per orthopedics.  Patient will restart aspirin today.  Patient has  follow-up with Dr. Bonilla in 2 weeks and her PCP within 1 week. Patient is ready for discharge.    Procedures Performed  Procedure(s):  Right elbow radical resection capsule soft tissue heterotopic bone with contracture release     Consults:   Consults     Date and Time Order Name Status Description    4/17/2017 1158 Inpatient Consult to Radiation Oncology Completed     4/17/2017 1158 Inpatient Consult to Hospitalist Completed         Pertinent Test Results:  Imaging Results (most recent)     Procedure Component Value Units Date/Time    XR Elbow 3+ View Right [[id number]] Collected:  04/17/17 1411     Updated:  04/17/17 2227    Narrative:       EXAMINATION: XR ELBOW 3+ VW RIGHT- 04/17/2017     INDICATION: Total elbow s/p excision of HO and capsular release      COMPARISON: 09/03/2016 right elbow plain films, and 09/12/2016  intraoperative images.     FINDINGS: History indicates recent surgery including capsular release.  The elbow joint prosthesis appears anatomically aligned. The bony  structures appear intact. There is some soft tissue gas dorsally,  typical for postop patient. No fracture line or bony avulsion is  identified.       Impression:       Elbow prosthesis in anatomic alignment.         D:  04/17/2017  E:  04/17/2017     This report was finalized on 4/17/2017 10:25 PM by DR. Dameon Avina MD.           Lab Results (most recent)     Procedure Component Value Units Date/Time    Urinalysis With / Microscopic If Indicated [[id number]]  (Normal) Collected:  04/17/17 2332    Specimen:  Urine from Urine, Clean Catch Updated:  04/17/17 2338     Color, UA Yellow     Appearance, UA Clear     pH, UA 6.0     Specific Gravity, UA 1.013     Glucose, UA Negative     Ketones, UA Negative     Bilirubin, UA Negative     Blood, UA Negative     Protein, UA Negative     Leuk Esterase, UA Negative     Nitrite, UA Negative     Urobilinogen, UA 0.2 E.U./dL    Narrative:       Urine microscopic not indicated.    CBC & Differential  "[[id number]] Collected:  04/18/17 0540    Specimen:  Blood Updated:  04/18/17 0628    Narrative:       The following orders were created for panel order CBC & Differential.  Procedure                               Abnormality         Status                     ---------                               -----------         ------                     CBC Auto Differential[[id number]]         Abnormal            Final result                 Please view results for these tests on the individual orders.    CBC Auto Differential [[id number]]  (Abnormal) Collected:  04/18/17 0540    Specimen:  Blood Updated:  04/18/17 0628     WBC 11.62 (H) 10*3/mm3      RBC 3.81 (L) 10*6/mm3      Hemoglobin 11.2 (L) g/dL      Hematocrit 35.2 %      MCV 92.4 fL      MCH 29.4 pg      MCHC 31.8 (L) g/dL      RDW 13.8 %      RDW-SD 46.9 fl      MPV 10.4 fL      Platelets 248 10*3/mm3      Neutrophil % 69.9 %      Lymphocyte % 19.9 (L) %      Monocyte % 9.6 %      Eosinophil % 0.1 %      Basophil % 0.3 %      Immature Grans % 0.2 %      Neutrophils, Absolute 8.14 10*3/mm3      Lymphocytes, Absolute 2.31 10*3/mm3      Monocytes, Absolute 1.11 (H) 10*3/mm3      Eosinophils, Absolute 0.01 (L) 10*3/mm3      Basophils, Absolute 0.03 10*3/mm3      Immature Grans, Absolute 0.02 10*3/mm3     Basic Metabolic Panel [[id number]]  (Abnormal) Collected:  04/18/17 0540    Specimen:  Blood Updated:  04/18/17 0638     Glucose 103 (H) mg/dL      BUN 12 mg/dL      Creatinine 1.10 mg/dL      Sodium 139 mmol/L      Potassium 4.0 mmol/L      Chloride 108 mmol/L      CO2 24.0 mmol/L      Calcium 8.6 (L) mg/dL      eGFR Non African Amer 48 (L) mL/min/1.73      BUN/Creatinine Ratio 10.9     Anion Gap 7.0 mmol/L         Condition on Discharge:  Stable    Physical Exam on Discharge:/55 (BP Location: Left arm, Patient Position: Lying)  Pulse 90  Temp 98.8 °F (37.1 °C) (Oral)   Resp 16  Ht 64\" (162.6 cm)  Wt 146 lb (66.2 kg)  LMP Comment: >55 years old  SpO2 96%  " Breastfeeding? No  BMI 25.06 kg/m2  Physical Exam  General: Alert, well-developed well-nourished female in no acute distress     Head: Normocephalic atraumatic     Eyes: PERRLA, EOMI, nonicteric, conjunctiva normal     ENT: Pink, moist mucous membranes     Neck: Supple, nontender, trachea midline without lymphadenopathy, JVD, nuchal rigidity.      Cardiovascular: RRR no M/R/G     Respiratory: Nonlabored, symmetrical chest expansion, clear to auscultation bilaterally     Abdomen: Soft, nontender, nondistended, positive bowel sounds in all 4 quadrants      Extremities: FROM in upper and lower extremities bilaterally with the exception of left elbow which is wrapped in Ace wrap bandage status post surgery. No E/C/C Negative calf pain.  +2 DP pulses bilaterally. Neuro/Pain catheter in right neck     Skin: Pink/warm/dry. No rash or lesions noted. Capillary refill<2sec in right fingertips distal to right elbow.     Neuro: Alert and oriented to person place time and situation, speech is clear, follows all commands, recent and remote memory intact     Psych: Patient is pleasant and cooperative. Normal affect. Negative suicidal ideation or homicidal ideation.    Discharge Disposition  Home or Self Care    Discharge Medications   [place]   Home Medication Instructions LONG:[id number]    Printed on:04/18/17 1110   Medication Information                      aspirin 81 MG EC tablet  Take 81 mg by mouth Daily.             bisacodyl (DULCOLAX) 5 MG EC tablet  Take 2 tablets by mouth Daily As Needed for Constipation.             cholecalciferol (VITAMIN D3) 1000 UNITS tablet  Take 2,000 Units by mouth daily.             diphenhydrAMINE (NIGHTTIME SLEEP AID) 25 MG tablet  Take 25 mg by mouth at night as needed for sleep.             ferrous sulfate 325 (65 FE) MG tablet  Take 325 mg by mouth daily with breakfast.             HYDROcodone-acetaminophen (NORCO)  MG per tablet  Take 1 tablet by mouth Every 4 (Four)  Hours As Needed for Moderate Pain (4-6) for up to 9 days.             Raloxifene HCl (EVISTA PO)  Take 60 mg by mouth daily.             Ropivacaine HCl (ROPIVACAINE, NAROPIN, 0.2% PERIPHERAL NERVE CATH, MOOG, 200 ML) 0.2 %  12 mg/hr by Peripheral Nerve route Continuous.             sennosides-docusate sodium (SENOKOT-S) 8.6-50 MG tablet  Take 2 tablets by mouth 2 (Two) Times a Day.             SIMVASTATIN PO  Take 40 mg by mouth daily.               Discharge Diet:   Diet Instructions     Diet: Regular; Thin Liquids, No Restrictions       Discharge Diet:  Regular   Fluid Consistency:  Thin Liquids, No Restrictions               Discharge Care Plan / Instructions:  Activity at Discharge:   Activity Instructions     Activity as Tolerated                   Follow-up Appointments  Future Appointments  Date Time Provider Department Center   4/18/2017 5:00 PM [facility]   7/12/2017 11:00 AM Darin Lee MD [facility] None     Additional Instructions for the Follow-ups that You Need to Schedule     Ambulatory Referral to Occupational Therapy    As directed    Aggressive right elbow range of motion, no limits       Discharge Follow-Up With Specified Provider    As directed    To:  Dr Bonilla   Follow Up:  2 Weeks       Discharge Follow-up with PCP    As directed    Follow Up Details:  Follow-up with Darin Baugh MD sometime this week.       Discharge Follow-up with Specified Provider    As directed    Follow Up:  1 Week   Has the patient’s follow-up appointment been scheduled and documented in the discharge navigator?:  Yes, documented in the appointment section               Test Results Pending at Discharge  None     ORION Kilpatrick 04/18/17 11:10 AM    Time: Discharge 45 min    Please note that portions of this note may have been completed with a voice recognition program. Efforts were made to edit the dictations, but occasionally words are mistranscribed.  I supervised care of the patient on  day of discharge with direct care provided by the advanced care provider (APC).

## 2017-04-18 NOTE — PROGRESS NOTES
Acute Care - Occupational Therapy Treatment Note  [facility]     Patient Name: Yanet Grvoe  : 1940  MRN: [id number]  Today's Date: 2017  Onset of Illness/Injury or Date of Surgery Date: 17  Date of Referral to OT: 17  Referring Physician: Dr. Bonilla      Admit Date: 2017    Visit Dx:     ICD-10-CM ICD-9-CM   1. Impaired mobility and ADLs Z74.09 799.89     Patient Active Problem List   Diagnosis   • Chest pain syndrome   • Insomnia   • Dyslipidemia   • Glaucoma   • Polymyalgia rheumatica   • Closed fracture of right distal humerus   • CKD (chronic kidney disease) stage 3, GFR 30-59 ml/min   • Impaired functional mobility, balance, gait, and endurance   • Heterotopic ossification             Adult Rehabilitation Note       17 0757          Rehab Assessment/Intervention    Discipline occupational therapist  -AR      Document Type therapy note (daily note)   POD#1  -AR      Subjective Information agree to therapy  -AR      Patient Effort, Rehab Treatment good  -AR      Symptoms Noted During/After Treatment none  -AR      Precautions/Limitations fall precautions;non-weight bearing status   infraclavicular nerve catheter  -AR      Specific Treatment Considerations --   pt scheduled for XRT today, not yet on On-Q  -AR      Recorded by [AR] Tess Goldstein OT      Pain Assessment    Pain Assessment 0-10  -AR      Pain Score 3  -AR      Post Pain Score 3  -AR      Pain Type Acute pain  -AR      Pain Location Elbow  -AR      Pain Orientation Anterior  -AR      Pain Intervention(s) Repositioned;Ambulation/increased activity  -AR      Response to Interventions tolerated  -AR      Recorded by [AR] Tess Goldstein OT      Cognitive Assessment/Intervention    Current Cognitive/Communication Assessment functional  -AR      Orientation Status oriented x 4  -AR      Follows Commands/Answers Questions 100% of the time;able to follow single-step instructions  -AR      Personal Safety  "mild impairment;decreased awareness, need for safety  -AR      Personal Safety Interventions fall prevention program maintained  -AR      Recorded by [AR] Tess Goldstein OT      Bed Mobility, Assessment/Treatment    Bed Mobility, Comment not observed, pt UIC  -AR      Recorded by [AR] Tess Goldstein OT      Upper Body Bathing Assessment/Training    UB Bathing Assess/Train, Comment Reviewed axilla care for comfort via maurice-technique  -AR      Recorded by [AR] Tess Goldstein OT      Upper Body Dressing Assessment/Training    UB Dressing Assess/Train, Comment unable to complete as On-Q not yet available, will return to assist pt with dressing. RN aware.   -AR      Recorded by [AR] Tess Goldstein OT      Grooming Assessment/Training    Grooming Assess/Train, Comment Pt able to simulate brushing teeth and washing face. Pt repleied, \" I haven't mick able to do this in months. \"  -AR      Recorded by [AR] Tess Goldstein OT      Therapy Exercises    Right Upper Extremity AROM:;10 reps;sitting;hand pumps;pronation/supination;AAROM:;elbow flexion/extension   AAROM elbow  following sustained stretching  -AR      Exercise Protocols --   pt demo RUE HEP with supervision  -AR      Recorded by [AR] Tess Goldstein OT      Positioning and Restraints    Pre-Treatment Position sitting in chair/recliner  -AR      Post Treatment Position chair  -AR      In Chair reclined;call light within reach;encouraged to call for assist;exit alarm on;notified nsg;RUE elevated  -AR      Recorded by [AR] Tess Goldstein OT        User Key  (r) = Recorded By, (t) = Taken By, (c) = Cosigned By    Initials Name Effective Dates    AR Tess Goldstein OT 06/22/15 -                 OT Goals       04/18/17 0938 04/17/17 1824       Transfer Training OT LTG    Transfer Training OT LTG, Date Established  04/17/17  -AR     Transfer Training OT LTG, Time to Achieve  by discharge  -AR     Transfer Training OT LTG, Activity " Type  sit to stand/stand to sit;toilet  -AR     Transfer Training OT LTG, [place] Level  verbal cues required;supervision required  -AR     Transfer Training OT LTG, Assist Device  --   AD PRN  -AR     Transfer Training OT LTG, Outcome goal ongoing  -AR      Range of Motion OT LTG    Range of Motion Goal OT LTG, Date Established  04/17/17  -AR     Range of Motion Goal OT LTG, Time to Achieve  by discharge  -AR     Range of Motion Goal OT LTG, AROM Measure  Pt will compelte RUE HEP with supervision in preparation for safe DC home.   -AR     Range of Motion Goal OT LTG, Outcome goal met  -AR      Patient Education OT LTG    Patient Education OT LTG, Date Established  04/17/17  -AR     Patient Education OT LTG, Time to Achieve  by discharge  -AR     Patient Education OT LTG, Education Type  written program;precautions per surgeon;1 hand/maurice technique;home safety  -AR     Patient Education OT LTG, Education Understanding  demonstrates adequately;verbalizes understanding  -AR     Patient Education OT LTG Outcome goal ongoing  -AR      UB Dressing OT LTG    UB Dressing Goal OT LTG, Date Established  04/17/17  -AR     UB Dressing Goal OT LTG, Time to Achieve  by discharge  -AR     UB Dressing Goal OT LTG, Activity Type  Pt will complete UB dressing task  -AR     UB Dressing Goal OT LTG, [place] Level  minimum assist (75% patient effort);verbal cues required  -AR     UB Dressing Goal OT LTG, Outcome goal ongoing  -AR        User Key  (r) = Recorded By, (t) = Taken By, (c) = Cosigned By    Initials Name Provider Type    AR Tess Goldstein, OT Occupational Therapist          Occupational Therapy Education     Title: PT OT SLP Therapies (Active)     Topic: Occupational Therapy (Done)     Point: ADL training (Done)    Description: Instruct learner(s) on proper safety adaptation and remediation techniques during self care or transfers.   Instruct in proper use of assistive devices.    Learning Progress Summary     Learner Readiness Method Response Comment Documented by Status   Patient Eager E,TB,D,H VU,NR reviewed RUE HEP, NWB RUE, DC recommendation, ADL retraining AR 04/18/17 0937 Done    Acceptance E,TB,D,H NR Reviewed NWB RUE, RUE HEP, ADL retraining, transfer trianing, bed mobiity, home safety, DC recommendation of rehab as she currently does not have assist at home AR 04/17/17 1822 Active               Point: Home exercise program (Done)    Description: Instruct learner(s) on appropriate technique for monitoring, assisting and/or progressing therapeutic exercises/activities.    Learning Progress Summary    Learner Readiness Method Response Comment Documented by Status   Patient Eager E,TB,D,H VU,NR reviewed RUE HEP, NWB RUE, DC recommendation, ADL retraining AR 04/18/17 0937 Done    Acceptance E,TB,D,H NR Reviewed NWB RUE, RUE HEP, ADL retraining, transfer trianing, bed mobiity, home safety, DC recommendation of rehab as she currently does not have assist at home AR 04/17/17 1822 Active               Point: Precautions (Done)    Description: Instruct learner(s) on prescribed precautions during self-care and functional transfers.    Learning Progress Summary    Learner Readiness Method Response Comment Documented by Status   Patient Eager HAYLEE,TB,D,H VU,NR reviewed RUE HEP, NWB RUE, DC recommendation, ADL retraining AR 04/18/17 0937 Done    Acceptance E,TB,D,H NR Reviewed NWB RUE, RUE HEP, ADL retraining, transfer trianing, bed mobiity, home safety, DC recommendation of rehab as she currently does not have assist at home AR 04/17/17 1822 Active               Point: Body mechanics (Done)    Description: Instruct learner(s) on proper positioning and spine alignment during self-care, functional mobility activities and/or exercises.    Learning Progress Summary    Learner Readiness Method Response Comment Documented by Status   Patient Eager E,TB,D,H VU,NR reviewed RUE HEP, NWB RUE, DC recommendation, ADL retraining AR 04/18/17  0937 Done    Acceptance E,TB,D,H NR Reviewed NWB RUE, RUE HEP, ADL retraining, transfer trianing, bed mobiity, home safety, DC recommendation of rehab as she currently does not have assist at home AR 04/17/17 1822 Active                      User Key     Initials Effective Dates Name Provider Type Discipline    AR 06/22/15 -  Tess Goldstein OT Occupational Therapist OT                  OT Recommendation and Plan  Anticipated Discharge Disposition: inpatient rehabilitation facility (pt declines and desire DC home)  Therapy Frequency: daily (per priority policy)  Plan of Care Review  Plan Of Care Reviewed With: patient  Progress: improving  Outcome Summary/Follow up Plan: Pt crystal VASQUEZ R elbow  with c/o 3/10 pain. DC disposition unclear at this time as spouse is hospitalized. Recommend IP rehab, pt continues to decline stating she needs to go home to care for spouse. If pt DC home today, OT will see for visit on dressing and care of On-Q ball with ADLS.         Outcome Measures       04/18/17 0757 04/17/17 1610       How much help from another is currently needed...    Putting on and taking off regular lower body clothing? 2  -AR 2  -AR     Bathing (including washing, rinsing, and drying) 2  -AR 2  -AR     Toileting (which includes using toilet bed pan or urinal) 3  -AR 3  -AR     Putting on and taking off regular upper body clothing 2  -AR 2  -AR     Taking care of personal grooming (such as brushing teeth) 3  -AR 3  -AR     Eating meals 3  -AR 3  -AR     Score 15  -AR 15  -AR     Functional Assessment    Outcome Measure Options AM-PAC 6 Clicks Daily Activity (OT)  -AR AM-PAC 6 Clicks Daily Activity (OT)  -AR       User Key  (r) = Recorded By, (t) = Taken By, (c) = Cosigned By    Initials Name Provider Type    AR Tess Goldstein OT Occupational Therapist           Time Calculation:         Time Calculation- OT       04/18/17 0941          Time Calculation- OT    OT Start Time 0757  -AR      Total Timed  Code Minutes- OT 26 minute(s)  -AR      OT Received On 04/18/17  -AR      OT Goal Re-Cert Due Date 04/27/17  -AR        User Key  (r) = Recorded By, (t) = Taken By, (c) = Cosigned By    Initials Name Provider Type    JOSEFINA Goldstein OT Occupational Therapist           Therapy Charges for Today     Code Description Service Date Service Provider Modifiers Qty    50921310226 HC OT EVAL MOD COMPLEXITY 4 4/17/2017 Tess Goldstein OT GO 1    55121612354 HC OT THERAPEUTIC ACT EA 15 MIN 4/17/2017 Tess Goldstein OT GO 2    90632184082 HC OT THER SUPP EA 15 MIN 4/17/2017 Tess Goldstein OT GO 2    85161405853 HC OT SELFCARE CURRENT 4/17/2017 Tess Goldstein OT GO, CK 1    44605604153 HC OT SELFCARE PROJECTED 4/17/2017 Tess Goldstein OT GO, CJ 1    10290769797 HC OT THERAPEUTIC ACT EA 15 MIN 4/18/2017 Tess Goldstein OT GO 1          OT G-codes  OT Professional Judgement Used?: Yes  OT Functional Scales Options: AM-PAC 6 Clicks Daily Activity (OT)  Functional Assessment Tool Used: 6 clicks  Score: 15  Functional Limitation: Self care  Self Care Current Status (): At least 40 percent but less than 60 percent impaired, limited or restricted  Self Care Goal Status (): At least 20 percent but less than 40 percent impaired, limited or restricted    Tess Goldstein OT  4/18/2017

## 2017-04-20 NOTE — THERAPY DISCHARGE NOTE
Acute Care - Occupational Therapy Discharge Summary  [facility]     Patient Name: Yanet Grove  : 1940  MRN: [id number]    Today's Date: 2017  Onset of Illness/Injury or Date of Surgery Date: 17    Date of Referral to OT: 17  Referring Physician: MD Chad      Admit Date: 2017        OT Recommendation and Plan    Visit Dx:    ICD-10-CM ICD-9-CM   1. Impaired mobility and ADLs Z74.09 799.89   2. Impaired functional mobility, balance, gait, and endurance Z74.09 V49.89                     OT Goals       17 0938 17 1824       Transfer Training OT LTG    Transfer Training OT LTG, Date Established  17  -AR     Transfer Training OT LTG, Time to Achieve  by discharge  -AR     Transfer Training OT LTG, Activity Type  sit to stand/stand to sit;toilet  -AR     Transfer Training OT LTG, [place] Level  verbal cues required;supervision required  -AR     Transfer Training OT LTG, Assist Device  --   AD PRN  -AR     Transfer Training OT LTG, Outcome goal ongoing  -AR      Range of Motion OT LTG    Range of Motion Goal OT LTG, Date Established  17  -AR     Range of Motion Goal OT LTG, Time to Achieve  by discharge  -AR     Range of Motion Goal OT LTG, AROM Measure  Pt will nash SPENCE HEP with supervision in preparation for safe DC home.   -AR     Range of Motion Goal OT LTG, Outcome goal met  -AR      Patient Education OT LTG    Patient Education OT LTG, Date Established  17  -AR     Patient Education OT LTG, Time to Achieve  by discharge  -AR     Patient Education OT LTG, Education Type  written program;precautions per surgeon;1 hand/maurice technique;home safety  -AR     Patient Education OT LTG, Education Understanding  demonstrates adequately;verbalizes understanding  -AR     Patient Education OT LTG Outcome goal ongoing  -AR      UB Dressing OT LTG    UB Dressing Goal OT LTG, Date Established  17  -AR     UB Dressing Goal OT LTG, Time to Achieve  by  discharge  -AR     UB Dressing Goal OT LTG, Activity Type  Pt will complete UB dressing task  -AR     UB Dressing Goal OT LTG, [place] Level  minimum assist (75% patient effort);verbal cues required  -AR     UB Dressing Goal OT LTG, Outcome goal ongoing  -AR        User Key  (r) = Recorded By, (t) = Taken By, (c) = Cosigned By    Initials Name Provider Type    JOSEFINA Goldstein, OT Occupational Therapist                Outcome Measures       04/18/17 1445 04/18/17 0757 04/17/17 1610    How much help from another person do you currently need...    Turning from your back to your side while in flat bed without using bedrails? 3  -MJ      Moving from lying on back to sitting on the side of a flat bed without bedrails? 3  -MJ      Moving to and from a bed to a chair (including a wheelchair)? 4  -MJ      Standing up from a chair using your arms (e.g., wheelchair, bedside chair)? 4  -MJ      Climbing 3-5 steps with a railing? 3  -MJ      To walk in hospital room? 3  -MJ      AM-PAC 6 Clicks Score 20  -MJ      How much help from another is currently needed...    Putting on and taking off regular lower body clothing?  2  -AR 2  -AR    Bathing (including washing, rinsing, and drying)  2  -AR 2  -AR    Toileting (which includes using toilet bed pan or urinal)  3  -AR 3  -AR    Putting on and taking off regular upper body clothing  2  -AR 2  -AR    Taking care of personal grooming (such as brushing teeth)  3  -AR 3  -AR    Eating meals  3  -AR 3  -AR    Score  15  -AR 15  -AR    Functional Assessment    Outcome Measure Options AM-PAC 6 Clicks Basic Mobility (PT)  -MJ AM-PAC 6 Clicks Daily Activity (OT)  -AR AM-PAC 6 Clicks Daily Activity (OT)  -AR      User Key  (r) = Recorded By, (t) = Taken By, (c) = Cosigned By    Initials Name Provider Type    JOSEFINA Goldstein, OT Occupational Therapist    NANCI Johansen, PT Physical Therapist              OT Discharge Summary  Reason for Discharge: Discharge from  facility  Outcomes Achieved: Refer to plan of care for updates on goals achieved  Discharge Destination: Home      Mimi Bender, OT  4/20/2017

## 2017-05-12 ENCOUNTER — HOSPITAL ENCOUNTER (OUTPATIENT)
Facility: HOSPITAL | Age: 77
Setting detail: OBSERVATION
Discharge: HOME | End: 2017-05-17
Attending: INTERNAL MEDICINE | Admitting: INTERNAL MEDICINE

## 2017-05-12 ENCOUNTER — APPOINTMENT (OUTPATIENT)
Dept: GENERAL RADIOLOGY | Facility: HOSPITAL | Age: 77
End: 2017-05-12

## 2017-05-12 DIAGNOSIS — Z78.9 IMPAIRED MOBILITY AND ADLS: ICD-10-CM

## 2017-05-12 DIAGNOSIS — Z74.09 IMPAIRED FUNCTIONAL MOBILITY, BALANCE, GAIT, AND ENDURANCE: Primary | ICD-10-CM

## 2017-05-12 DIAGNOSIS — Z74.09 IMPAIRED MOBILITY AND ADLS: ICD-10-CM

## 2017-05-12 DIAGNOSIS — L03.113 RIGHT ARM CELLULITIS: ICD-10-CM

## 2017-05-12 LAB
ALBUMIN SERPL-MCNC: 3.8 G/DL (ref 3.2–4.8)
ALBUMIN/GLOB SERPL: 1.6 G/DL (ref 1.5–2.5)
ALP SERPL-CCNC: 61 U/L (ref 25–100)
ALT SERPL W P-5'-P-CCNC: 10 U/L (ref 7–40)
ANION GAP SERPL CALCULATED.3IONS-SCNC: 8 MMOL/L (ref 3–11)
AST SERPL-CCNC: 21 U/L (ref 0–33)
BACTERIA UR QL AUTO: ABNORMAL /HPF
BASOPHILS # BLD AUTO: 0.06 10*3/MM3 (ref 0–0.2)
BASOPHILS NFR BLD AUTO: 0.5 % (ref 0–1)
BILIRUB SERPL-MCNC: 1.3 MG/DL (ref 0.3–1.2)
BILIRUB UR QL STRIP: NEGATIVE
BUN BLD-MCNC: 14 MG/DL (ref 9–23)
BUN/CREAT SERPL: 14 (ref 7–25)
CALCIUM SPEC-SCNC: 9.1 MG/DL (ref 8.7–10.4)
CHLORIDE SERPL-SCNC: 103 MMOL/L (ref 99–109)
CLARITY UR: CLEAR
CO2 SERPL-SCNC: 26 MMOL/L (ref 20–31)
COLOR UR: ABNORMAL
CREAT BLD-MCNC: 1 MG/DL (ref 0.6–1.3)
CRP SERPL-MCNC: 15.75 MG/DL (ref 0–1)
DEPRECATED RDW RBC AUTO: 42.7 FL (ref 37–54)
EOSINOPHIL # BLD AUTO: 0.06 10*3/MM3 (ref 0.1–0.3)
EOSINOPHIL NFR BLD AUTO: 0.5 % (ref 0–3)
ERYTHROCYTE [DISTWIDTH] IN BLOOD BY AUTOMATED COUNT: 12.9 % (ref 11.3–14.5)
ERYTHROCYTE [SEDIMENTATION RATE] IN BLOOD: 10 MM/HR (ref 0–30)
GFR SERPL CREATININE-BSD FRML MDRD: 54 ML/MIN/1.73
GLOBULIN UR ELPH-MCNC: 2.4 GM/DL
GLUCOSE BLD-MCNC: 94 MG/DL (ref 70–100)
GLUCOSE UR STRIP-MCNC: NEGATIVE MG/DL
HCT VFR BLD AUTO: 36.6 % (ref 34.5–44)
HGB BLD-MCNC: 11.6 G/DL (ref 11.5–15.5)
HGB UR QL STRIP.AUTO: NEGATIVE
HYALINE CASTS UR QL AUTO: ABNORMAL /LPF
IMM GRANULOCYTES # BLD: 0.04 10*3/MM3 (ref 0–0.03)
IMM GRANULOCYTES NFR BLD: 0.3 % (ref 0–0.6)
INR PPP: 1.1
KETONES UR QL STRIP: NEGATIVE
LEUKOCYTE ESTERASE UR QL STRIP.AUTO: ABNORMAL
LYMPHOCYTES # BLD AUTO: 1.99 10*3/MM3 (ref 0.6–4.8)
LYMPHOCYTES NFR BLD AUTO: 16.2 % (ref 24–44)
MCH RBC QN AUTO: 28.6 PG (ref 27–31)
MCHC RBC AUTO-ENTMCNC: 31.7 G/DL (ref 32–36)
MCV RBC AUTO: 90.1 FL (ref 80–99)
MONOCYTES # BLD AUTO: 1.1 10*3/MM3 (ref 0–1)
MONOCYTES NFR BLD AUTO: 8.9 % (ref 0–12)
NEUTROPHILS # BLD AUTO: 9.07 10*3/MM3 (ref 1.5–8.3)
NEUTROPHILS NFR BLD AUTO: 73.6 % (ref 41–71)
NITRITE UR QL STRIP: NEGATIVE
PH UR STRIP.AUTO: 5.5 [PH] (ref 5–8)
PLATELET # BLD AUTO: 272 10*3/MM3 (ref 150–450)
PMV BLD AUTO: 10.2 FL (ref 6–12)
POTASSIUM BLD-SCNC: 4.3 MMOL/L (ref 3.5–5.5)
PROT SERPL-MCNC: 6.2 G/DL (ref 5.7–8.2)
PROT UR QL STRIP: NEGATIVE
PROTHROMBIN TIME: 12 SECONDS (ref 9.6–11.5)
RBC # BLD AUTO: 4.06 10*6/MM3 (ref 3.89–5.14)
RBC # UR: ABNORMAL /HPF
REF LAB TEST METHOD: ABNORMAL
SODIUM BLD-SCNC: 137 MMOL/L (ref 132–146)
SP GR UR STRIP: 1.02 (ref 1–1.03)
SQUAMOUS #/AREA URNS HPF: ABNORMAL /HPF
UROBILINOGEN UR QL STRIP: ABNORMAL
WBC NRBC COR # BLD: 12.32 10*3/MM3 (ref 3.5–10.8)
WBC UR QL AUTO: ABNORMAL /HPF

## 2017-05-12 PROCEDURE — 87070 CULTURE OTHR SPECIMN AEROBIC: CPT | Performed by: NURSE PRACTITIONER

## 2017-05-12 PROCEDURE — G0378 HOSPITAL OBSERVATION PER HR: HCPCS

## 2017-05-12 PROCEDURE — 99220 PR INITIAL OBSERVATION CARE/DAY 70 MINUTES: CPT | Performed by: FAMILY MEDICINE

## 2017-05-12 PROCEDURE — 81001 URINALYSIS AUTO W/SCOPE: CPT | Performed by: NURSE PRACTITIONER

## 2017-05-12 PROCEDURE — 87186 SC STD MICRODIL/AGAR DIL: CPT | Performed by: NURSE PRACTITIONER

## 2017-05-12 PROCEDURE — 25010000002 VANCOMYCIN HCL IN NACL 1.25-0.9 GM/250ML-% SOLUTION: Performed by: FAMILY MEDICINE

## 2017-05-12 PROCEDURE — 85025 COMPLETE CBC W/AUTO DIFF WBC: CPT | Performed by: NURSE PRACTITIONER

## 2017-05-12 PROCEDURE — 85610 PROTHROMBIN TIME: CPT | Performed by: NURSE PRACTITIONER

## 2017-05-12 PROCEDURE — 96372 THER/PROPH/DIAG INJ SC/IM: CPT

## 2017-05-12 PROCEDURE — 87205 SMEAR GRAM STAIN: CPT | Performed by: NURSE PRACTITIONER

## 2017-05-12 PROCEDURE — 73070 X-RAY EXAM OF ELBOW: CPT

## 2017-05-12 PROCEDURE — 25010000002 PIPERACILLIN-TAZOBACTAM: Performed by: NURSE PRACTITIONER

## 2017-05-12 PROCEDURE — 87040 BLOOD CULTURE FOR BACTERIA: CPT | Performed by: NURSE PRACTITIONER

## 2017-05-12 PROCEDURE — 87147 CULTURE TYPE IMMUNOLOGIC: CPT | Performed by: NURSE PRACTITIONER

## 2017-05-12 PROCEDURE — 86140 C-REACTIVE PROTEIN: CPT | Performed by: NURSE PRACTITIONER

## 2017-05-12 PROCEDURE — 80053 COMPREHEN METABOLIC PANEL: CPT | Performed by: NURSE PRACTITIONER

## 2017-05-12 PROCEDURE — 25010000002 HEPARIN (PORCINE) PER 1000 UNITS: Performed by: NURSE PRACTITIONER

## 2017-05-12 PROCEDURE — 85652 RBC SED RATE AUTOMATED: CPT | Performed by: NURSE PRACTITIONER

## 2017-05-12 PROCEDURE — 87077 CULTURE AEROBIC IDENTIFY: CPT | Performed by: NURSE PRACTITIONER

## 2017-05-12 RX ORDER — VANCOMYCIN HYDROCHLORIDE
1250 ONCE
Status: COMPLETED | OUTPATIENT
Start: 2017-05-12 | End: 2017-05-12

## 2017-05-12 RX ORDER — SACCHAROMYCES BOULARDII 250 MG
250 CAPSULE ORAL 2 TIMES DAILY
Status: DISCONTINUED | OUTPATIENT
Start: 2017-05-12 | End: 2017-05-17 | Stop reason: HOSPADM

## 2017-05-12 RX ORDER — SODIUM CHLORIDE 9 MG/ML
50 INJECTION, SOLUTION INTRAVENOUS CONTINUOUS
Status: DISCONTINUED | OUTPATIENT
Start: 2017-05-12 | End: 2017-05-13

## 2017-05-12 RX ORDER — HYDROCODONE BITARTRATE AND ACETAMINOPHEN 5; 325 MG/1; MG/1
1 TABLET ORAL EVERY 4 HOURS PRN
Status: DISCONTINUED | OUTPATIENT
Start: 2017-05-12 | End: 2017-05-17 | Stop reason: HOSPADM

## 2017-05-12 RX ORDER — DOCUSATE SODIUM 100 MG/1
100 CAPSULE, LIQUID FILLED ORAL 2 TIMES DAILY
Status: DISCONTINUED | OUTPATIENT
Start: 2017-05-12 | End: 2017-05-13

## 2017-05-12 RX ORDER — ONDANSETRON 2 MG/ML
4 INJECTION INTRAMUSCULAR; INTRAVENOUS EVERY 6 HOURS PRN
Status: DISCONTINUED | OUTPATIENT
Start: 2017-05-12 | End: 2017-05-17 | Stop reason: HOSPADM

## 2017-05-12 RX ORDER — BISACODYL 5 MG/1
5 TABLET, DELAYED RELEASE ORAL DAILY PRN
Status: DISCONTINUED | OUTPATIENT
Start: 2017-05-12 | End: 2017-05-17 | Stop reason: HOSPADM

## 2017-05-12 RX ORDER — ACETAMINOPHEN 325 MG/1
650 TABLET ORAL EVERY 4 HOURS PRN
Status: DISCONTINUED | OUTPATIENT
Start: 2017-05-12 | End: 2017-05-17 | Stop reason: HOSPADM

## 2017-05-12 RX ORDER — HEPARIN SODIUM 5000 [USP'U]/ML
5000 INJECTION, SOLUTION INTRAVENOUS; SUBCUTANEOUS EVERY 12 HOURS SCHEDULED
Status: DISCONTINUED | OUTPATIENT
Start: 2017-05-12 | End: 2017-05-17 | Stop reason: HOSPADM

## 2017-05-12 RX ORDER — SODIUM CHLORIDE 0.9 % (FLUSH) 0.9 %
1-10 SYRINGE (ML) INJECTION AS NEEDED
Status: DISCONTINUED | OUTPATIENT
Start: 2017-05-12 | End: 2017-05-17 | Stop reason: HOSPADM

## 2017-05-12 RX ORDER — ONDANSETRON 4 MG/1
4 TABLET, FILM COATED ORAL EVERY 6 HOURS PRN
Status: DISCONTINUED | OUTPATIENT
Start: 2017-05-12 | End: 2017-05-17 | Stop reason: HOSPADM

## 2017-05-12 RX ADMIN — DOCUSATE SODIUM 100 MG: 100 CAPSULE, LIQUID FILLED ORAL at 17:41

## 2017-05-12 RX ADMIN — PIPERACILLIN SODIUM,TAZOBACTAM SODIUM 3.38 G: 3; .375 INJECTION, POWDER, FOR SOLUTION INTRAVENOUS at 17:19

## 2017-05-12 RX ADMIN — HEPARIN SODIUM 5000 UNITS: 5000 INJECTION, SOLUTION INTRAVENOUS; SUBCUTANEOUS at 20:47

## 2017-05-12 RX ADMIN — SODIUM CHLORIDE 50 ML/HR: 9 INJECTION, SOLUTION INTRAVENOUS at 17:19

## 2017-05-12 RX ADMIN — ACETAMINOPHEN 650 MG: 325 TABLET ORAL at 20:51

## 2017-05-12 RX ADMIN — VANCOMYCIN HYDROCHLORIDE 1250 MG: 1 INJECTION, POWDER, LYOPHILIZED, FOR SOLUTION INTRAVENOUS at 18:16

## 2017-05-12 RX ADMIN — Medication 250 MG: at 17:41

## 2017-05-13 LAB
ALBUMIN SERPL-MCNC: 3.4 G/DL (ref 3.2–4.8)
ALBUMIN/GLOB SERPL: 1.5 G/DL (ref 1.5–2.5)
ALP SERPL-CCNC: 57 U/L (ref 25–100)
ALT SERPL W P-5'-P-CCNC: 11 U/L (ref 7–40)
ANION GAP SERPL CALCULATED.3IONS-SCNC: 5 MMOL/L (ref 3–11)
AST SERPL-CCNC: 21 U/L (ref 0–33)
BASOPHILS # BLD AUTO: 0.11 10*3/MM3 (ref 0–0.2)
BASOPHILS NFR BLD AUTO: 1.1 % (ref 0–1)
BILIRUB SERPL-MCNC: 1 MG/DL (ref 0.3–1.2)
BUN BLD-MCNC: 13 MG/DL (ref 9–23)
BUN/CREAT SERPL: 11.8 (ref 7–25)
CALCIUM SPEC-SCNC: 8.6 MG/DL (ref 8.7–10.4)
CHLORIDE SERPL-SCNC: 106 MMOL/L (ref 99–109)
CO2 SERPL-SCNC: 29 MMOL/L (ref 20–31)
CREAT BLD-MCNC: 1.1 MG/DL (ref 0.6–1.3)
DEPRECATED RDW RBC AUTO: 45.6 FL (ref 37–54)
EOSINOPHIL # BLD AUTO: 0.13 10*3/MM3 (ref 0.1–0.3)
EOSINOPHIL NFR BLD AUTO: 1.3 % (ref 0–3)
ERYTHROCYTE [DISTWIDTH] IN BLOOD BY AUTOMATED COUNT: 13.3 % (ref 11.3–14.5)
ERYTHROCYTE [SEDIMENTATION RATE] IN BLOOD: 7 MM/HR (ref 0–30)
GFR SERPL CREATININE-BSD FRML MDRD: 48 ML/MIN/1.73
GLOBULIN UR ELPH-MCNC: 2.2 GM/DL
GLUCOSE BLD-MCNC: 94 MG/DL (ref 70–100)
HCT VFR BLD AUTO: 34.6 % (ref 34.5–44)
HGB BLD-MCNC: 11.1 G/DL (ref 11.5–15.5)
IMM GRANULOCYTES # BLD: 0.02 10*3/MM3 (ref 0–0.03)
IMM GRANULOCYTES NFR BLD: 0.2 % (ref 0–0.6)
LYMPHOCYTES # BLD AUTO: 2.29 10*3/MM3 (ref 0.6–4.8)
LYMPHOCYTES NFR BLD AUTO: 22.3 % (ref 24–44)
MCH RBC QN AUTO: 29.9 PG (ref 27–31)
MCHC RBC AUTO-ENTMCNC: 32.1 G/DL (ref 32–36)
MCV RBC AUTO: 93.3 FL (ref 80–99)
MONOCYTES # BLD AUTO: 1.13 10*3/MM3 (ref 0–1)
MONOCYTES NFR BLD AUTO: 11 % (ref 0–12)
NEUTROPHILS # BLD AUTO: 6.61 10*3/MM3 (ref 1.5–8.3)
NEUTROPHILS NFR BLD AUTO: 64.1 % (ref 41–71)
PLATELET # BLD AUTO: 257 10*3/MM3 (ref 150–450)
PMV BLD AUTO: 10.3 FL (ref 6–12)
POTASSIUM BLD-SCNC: 4.3 MMOL/L (ref 3.5–5.5)
PROT SERPL-MCNC: 5.6 G/DL (ref 5.7–8.2)
RBC # BLD AUTO: 3.71 10*6/MM3 (ref 3.89–5.14)
SODIUM BLD-SCNC: 140 MMOL/L (ref 132–146)
VANCOMYCIN SERPL-MCNC: 15 MCG/ML (ref 5–40)
WBC NRBC COR # BLD: 10.29 10*3/MM3 (ref 3.5–10.8)

## 2017-05-13 PROCEDURE — 25010000002 VANCOMYCIN PER 500 MG

## 2017-05-13 PROCEDURE — 85652 RBC SED RATE AUTOMATED: CPT | Performed by: FAMILY MEDICINE

## 2017-05-13 PROCEDURE — 96372 THER/PROPH/DIAG INJ SC/IM: CPT

## 2017-05-13 PROCEDURE — 85025 COMPLETE CBC W/AUTO DIFF WBC: CPT | Performed by: NURSE PRACTITIONER

## 2017-05-13 PROCEDURE — 97162 PT EVAL MOD COMPLEX 30 MIN: CPT

## 2017-05-13 PROCEDURE — G8979 MOBILITY GOAL STATUS: HCPCS

## 2017-05-13 PROCEDURE — 25010000002 CEFEPIME: Performed by: INTERNAL MEDICINE

## 2017-05-13 PROCEDURE — 97165 OT EVAL LOW COMPLEX 30 MIN: CPT

## 2017-05-13 PROCEDURE — 25010000002 HEPARIN (PORCINE) PER 1000 UNITS: Performed by: NURSE PRACTITIONER

## 2017-05-13 PROCEDURE — G8987 SELF CARE CURRENT STATUS: HCPCS

## 2017-05-13 PROCEDURE — G0378 HOSPITAL OBSERVATION PER HR: HCPCS

## 2017-05-13 PROCEDURE — 80053 COMPREHEN METABOLIC PANEL: CPT | Performed by: NURSE PRACTITIONER

## 2017-05-13 PROCEDURE — 80202 ASSAY OF VANCOMYCIN: CPT | Performed by: FAMILY MEDICINE

## 2017-05-13 PROCEDURE — 99225 PR SBSQ OBSERVATION CARE/DAY 25 MINUTES: CPT | Performed by: INTERNAL MEDICINE

## 2017-05-13 PROCEDURE — G8978 MOBILITY CURRENT STATUS: HCPCS

## 2017-05-13 RX ORDER — VANCOMYCIN HYDROCHLORIDE 1 G/200ML
1000 INJECTION, SOLUTION INTRAVENOUS EVERY 24 HOURS
Status: DISCONTINUED | OUTPATIENT
Start: 2017-05-13 | End: 2017-05-16

## 2017-05-13 RX ORDER — SIMVASTATIN 40 MG
40 TABLET ORAL DAILY
Status: DISCONTINUED | OUTPATIENT
Start: 2017-05-13 | End: 2017-05-13 | Stop reason: CLARIF

## 2017-05-13 RX ORDER — FERROUS SULFATE 325(65) MG
325 TABLET ORAL
Status: DISCONTINUED | OUTPATIENT
Start: 2017-05-14 | End: 2017-05-17 | Stop reason: HOSPADM

## 2017-05-13 RX ORDER — SENNA AND DOCUSATE SODIUM 50; 8.6 MG/1; MG/1
2 TABLET, FILM COATED ORAL 2 TIMES DAILY
Status: DISCONTINUED | OUTPATIENT
Start: 2017-05-13 | End: 2017-05-17 | Stop reason: HOSPADM

## 2017-05-13 RX ORDER — RALOXIFENE HYDROCHLORIDE 60 MG/1
60 TABLET, FILM COATED ORAL DAILY
Status: DISCONTINUED | OUTPATIENT
Start: 2017-05-14 | End: 2017-05-17 | Stop reason: HOSPADM

## 2017-05-13 RX ORDER — ASPIRIN 81 MG/1
81 TABLET ORAL DAILY
Status: DISCONTINUED | OUTPATIENT
Start: 2017-05-14 | End: 2017-05-17 | Stop reason: HOSPADM

## 2017-05-13 RX ORDER — MELATONIN
2000 DAILY
Status: DISCONTINUED | OUTPATIENT
Start: 2017-05-14 | End: 2017-05-17 | Stop reason: HOSPADM

## 2017-05-13 RX ORDER — ATORVASTATIN CALCIUM 20 MG/1
20 TABLET, FILM COATED ORAL NIGHTLY
Status: DISCONTINUED | OUTPATIENT
Start: 2017-05-13 | End: 2017-05-17 | Stop reason: HOSPADM

## 2017-05-13 RX ADMIN — HEPARIN SODIUM 5000 UNITS: 5000 INJECTION, SOLUTION INTRAVENOUS; SUBCUTANEOUS at 20:27

## 2017-05-13 RX ADMIN — VANCOMYCIN HYDROCHLORIDE 1000 MG: 1 INJECTION, SOLUTION INTRAVENOUS at 08:45

## 2017-05-13 RX ADMIN — ACETAMINOPHEN 650 MG: 325 TABLET ORAL at 08:44

## 2017-05-13 RX ADMIN — Medication 250 MG: at 08:45

## 2017-05-13 RX ADMIN — SODIUM CHLORIDE 50 ML/HR: 9 INJECTION, SOLUTION INTRAVENOUS at 14:40

## 2017-05-13 RX ADMIN — DOCUSATE SODIUM 100 MG: 100 CAPSULE, LIQUID FILLED ORAL at 08:45

## 2017-05-13 RX ADMIN — CEFEPIME 2 G: 2 INJECTION, POWDER, FOR SOLUTION INTRAVENOUS at 14:39

## 2017-05-13 RX ADMIN — HEPARIN SODIUM 5000 UNITS: 5000 INJECTION, SOLUTION INTRAVENOUS; SUBCUTANEOUS at 08:45

## 2017-05-13 RX ADMIN — ACETAMINOPHEN 650 MG: 325 TABLET ORAL at 16:23

## 2017-05-13 RX ADMIN — Medication 250 MG: at 18:24

## 2017-05-14 PROCEDURE — 99224 PR SBSQ OBSERVATION CARE/DAY 15 MINUTES: CPT | Performed by: NURSE PRACTITIONER

## 2017-05-14 PROCEDURE — G8992 OTHER PT/OT  D/C STATUS: HCPCS

## 2017-05-14 PROCEDURE — 25010000002 CEFEPIME: Performed by: INTERNAL MEDICINE

## 2017-05-14 PROCEDURE — 97164 PT RE-EVAL EST PLAN CARE: CPT

## 2017-05-14 PROCEDURE — 96372 THER/PROPH/DIAG INJ SC/IM: CPT

## 2017-05-14 PROCEDURE — G8991 OTHER PT/OT GOAL STATUS: HCPCS

## 2017-05-14 PROCEDURE — G0378 HOSPITAL OBSERVATION PER HR: HCPCS

## 2017-05-14 PROCEDURE — 97605 NEG PRS WND THER DME<=50SQCM: CPT

## 2017-05-14 PROCEDURE — 97597 DBRDMT OPN WND 1ST 20 CM/<: CPT

## 2017-05-14 PROCEDURE — 25010000002 HEPARIN (PORCINE) PER 1000 UNITS: Performed by: NURSE PRACTITIONER

## 2017-05-14 PROCEDURE — G8988 SELF CARE GOAL STATUS: HCPCS

## 2017-05-14 PROCEDURE — 29583: CPT

## 2017-05-14 PROCEDURE — G8990 OTHER PT/OT CURRENT STATUS: HCPCS

## 2017-05-14 PROCEDURE — 25010000002 VANCOMYCIN PER 500 MG

## 2017-05-14 RX ADMIN — Medication 250 MG: at 17:40

## 2017-05-14 RX ADMIN — VITAMIN D, TAB 1000IU (100/BT) 2000 UNITS: 25 TAB at 09:14

## 2017-05-14 RX ADMIN — VANCOMYCIN HYDROCHLORIDE 1000 MG: 1 INJECTION, SOLUTION INTRAVENOUS at 09:29

## 2017-05-14 RX ADMIN — CEFEPIME 2 G: 2 INJECTION, POWDER, FOR SOLUTION INTRAVENOUS at 01:00

## 2017-05-14 RX ADMIN — ASPIRIN 81 MG: 81 TABLET, COATED ORAL at 09:14

## 2017-05-14 RX ADMIN — HEPARIN SODIUM 5000 UNITS: 5000 INJECTION, SOLUTION INTRAVENOUS; SUBCUTANEOUS at 20:35

## 2017-05-14 RX ADMIN — HYDROCODONE BITARTRATE AND ACETAMINOPHEN 1 TABLET: 5; 325 TABLET ORAL at 17:39

## 2017-05-14 RX ADMIN — RALOXIFENE 60 MG: 60 TABLET ORAL at 09:14

## 2017-05-14 RX ADMIN — Medication 250 MG: at 09:13

## 2017-05-14 RX ADMIN — Medication 325 MG: at 09:14

## 2017-05-14 RX ADMIN — HEPARIN SODIUM 5000 UNITS: 5000 INJECTION, SOLUTION INTRAVENOUS; SUBCUTANEOUS at 09:15

## 2017-05-15 ENCOUNTER — APPOINTMENT (OUTPATIENT)
Dept: GENERAL RADIOLOGY | Facility: HOSPITAL | Age: 77
End: 2017-05-15

## 2017-05-15 PROBLEM — M79.672 FOOT PAIN, LEFT: Status: ACTIVE | Noted: 2017-05-15

## 2017-05-15 LAB
ANION GAP SERPL CALCULATED.3IONS-SCNC: 7 MMOL/L (ref 3–11)
BACTERIA SPEC AEROBE CULT: ABNORMAL
BUN BLD-MCNC: 7 MG/DL (ref 9–23)
BUN/CREAT SERPL: 7 (ref 7–25)
CALCIUM SPEC-SCNC: 8.8 MG/DL (ref 8.7–10.4)
CHLORIDE SERPL-SCNC: 106 MMOL/L (ref 99–109)
CO2 SERPL-SCNC: 27 MMOL/L (ref 20–31)
CREAT BLD-MCNC: 1 MG/DL (ref 0.6–1.3)
DEPRECATED RDW RBC AUTO: 42.6 FL (ref 37–54)
ERYTHROCYTE [DISTWIDTH] IN BLOOD BY AUTOMATED COUNT: 12.9 % (ref 11.3–14.5)
GFR SERPL CREATININE-BSD FRML MDRD: 54 ML/MIN/1.73
GLUCOSE BLD-MCNC: 103 MG/DL (ref 70–100)
GRAM STN SPEC: ABNORMAL
GRAM STN SPEC: ABNORMAL
HCT VFR BLD AUTO: 31.7 % (ref 34.5–44)
HGB BLD-MCNC: 10 G/DL (ref 11.5–15.5)
MCH RBC QN AUTO: 28.5 PG (ref 27–31)
MCHC RBC AUTO-ENTMCNC: 31.5 G/DL (ref 32–36)
MCV RBC AUTO: 90.3 FL (ref 80–99)
PLATELET # BLD AUTO: 278 10*3/MM3 (ref 150–450)
PMV BLD AUTO: 9.8 FL (ref 6–12)
POTASSIUM BLD-SCNC: 4.1 MMOL/L (ref 3.5–5.5)
RBC # BLD AUTO: 3.51 10*6/MM3 (ref 3.89–5.14)
SODIUM BLD-SCNC: 140 MMOL/L (ref 132–146)
VANCOMYCIN TROUGH SERPL-MCNC: 15.4 MCG/ML (ref 10–20)
WBC NRBC COR # BLD: 7.83 10*3/MM3 (ref 3.5–10.8)

## 2017-05-15 PROCEDURE — 80048 BASIC METABOLIC PNL TOTAL CA: CPT

## 2017-05-15 PROCEDURE — 97530 THERAPEUTIC ACTIVITIES: CPT

## 2017-05-15 PROCEDURE — 99225 PR SBSQ OBSERVATION CARE/DAY 25 MINUTES: CPT | Performed by: NURSE PRACTITIONER

## 2017-05-15 PROCEDURE — 85027 COMPLETE CBC AUTOMATED: CPT

## 2017-05-15 PROCEDURE — 25010000002 VANCOMYCIN PER 500 MG

## 2017-05-15 PROCEDURE — 73630 X-RAY EXAM OF FOOT: CPT

## 2017-05-15 PROCEDURE — 96372 THER/PROPH/DIAG INJ SC/IM: CPT

## 2017-05-15 PROCEDURE — G0378 HOSPITAL OBSERVATION PER HR: HCPCS

## 2017-05-15 PROCEDURE — 80202 ASSAY OF VANCOMYCIN: CPT

## 2017-05-15 PROCEDURE — 25010000002 HEPARIN (PORCINE) PER 1000 UNITS: Performed by: NURSE PRACTITIONER

## 2017-05-15 RX ADMIN — ASPIRIN 81 MG: 81 TABLET, COATED ORAL at 09:46

## 2017-05-15 RX ADMIN — HEPARIN SODIUM 5000 UNITS: 5000 INJECTION, SOLUTION INTRAVENOUS; SUBCUTANEOUS at 09:46

## 2017-05-15 RX ADMIN — VANCOMYCIN HYDROCHLORIDE 1000 MG: 1 INJECTION, SOLUTION INTRAVENOUS at 09:52

## 2017-05-15 RX ADMIN — Medication 250 MG: at 09:47

## 2017-05-15 RX ADMIN — RALOXIFENE 60 MG: 60 TABLET ORAL at 09:47

## 2017-05-15 RX ADMIN — VITAMIN D, TAB 1000IU (100/BT) 2000 UNITS: 25 TAB at 09:47

## 2017-05-15 RX ADMIN — Medication 325 MG: at 09:47

## 2017-05-15 RX ADMIN — HEPARIN SODIUM 5000 UNITS: 5000 INJECTION, SOLUTION INTRAVENOUS; SUBCUTANEOUS at 22:44

## 2017-05-15 RX ADMIN — Medication 250 MG: at 17:26

## 2017-05-16 LAB — URATE SERPL-MCNC: 5 MG/DL (ref 3.1–7.8)

## 2017-05-16 PROCEDURE — 25010000002 VANCOMYCIN PER 500 MG

## 2017-05-16 PROCEDURE — 96372 THER/PROPH/DIAG INJ SC/IM: CPT

## 2017-05-16 PROCEDURE — C1894 INTRO/SHEATH, NON-LASER: HCPCS

## 2017-05-16 PROCEDURE — 84550 ASSAY OF BLOOD/URIC ACID: CPT | Performed by: INTERNAL MEDICINE

## 2017-05-16 PROCEDURE — G0378 HOSPITAL OBSERVATION PER HR: HCPCS

## 2017-05-16 PROCEDURE — 99226 PR SBSQ OBSERVATION CARE/DAY 35 MINUTES: CPT | Performed by: NURSE PRACTITIONER

## 2017-05-16 PROCEDURE — C1751 CATH, INF, PER/CENT/MIDLINE: HCPCS

## 2017-05-16 PROCEDURE — 97605 NEG PRS WND THER DME<=50SQCM: CPT

## 2017-05-16 PROCEDURE — 97110 THERAPEUTIC EXERCISES: CPT

## 2017-05-16 PROCEDURE — 97116 GAIT TRAINING THERAPY: CPT

## 2017-05-16 PROCEDURE — 25010000002 HEPARIN (PORCINE) PER 1000 UNITS: Performed by: NURSE PRACTITIONER

## 2017-05-16 RX ORDER — HYDROCODONE BITARTRATE AND ACETAMINOPHEN 5; 325 MG/1; MG/1
1 TABLET ORAL EVERY 4 HOURS PRN
Qty: 18 TABLET | Refills: 0 | Status: SHIPPED | OUTPATIENT
Start: 2017-05-16 | End: 2017-07-12

## 2017-05-16 RX ORDER — SODIUM CHLORIDE 0.9 % (FLUSH) 0.9 %
10 SYRINGE (ML) INJECTION AS NEEDED
Status: DISCONTINUED | OUTPATIENT
Start: 2017-05-16 | End: 2017-05-17 | Stop reason: HOSPADM

## 2017-05-16 RX ORDER — ACETAMINOPHEN 325 MG/1
650 TABLET ORAL EVERY 4 HOURS PRN
Refills: 0
Start: 2017-05-16 | End: 2017-07-12

## 2017-05-16 RX ORDER — SACCHAROMYCES BOULARDII 250 MG
250 CAPSULE ORAL 2 TIMES DAILY
Qty: 60 CAPSULE | Refills: 0 | Status: SHIPPED | OUTPATIENT
Start: 2017-05-16 | End: 2017-07-12

## 2017-05-16 RX ADMIN — DICLOFENAC SODIUM 2 G: 10 GEL TOPICAL at 17:27

## 2017-05-16 RX ADMIN — HYDROCODONE BITARTRATE AND ACETAMINOPHEN 1 TABLET: 5; 325 TABLET ORAL at 12:16

## 2017-05-16 RX ADMIN — DICLOFENAC SODIUM 2 G: 10 GEL TOPICAL at 21:16

## 2017-05-16 RX ADMIN — DICLOFENAC SODIUM 2 G: 10 GEL TOPICAL at 14:01

## 2017-05-16 RX ADMIN — Medication 250 MG: at 17:27

## 2017-05-16 RX ADMIN — RALOXIFENE 60 MG: 60 TABLET ORAL at 08:16

## 2017-05-16 RX ADMIN — HEPARIN SODIUM 5000 UNITS: 5000 INJECTION, SOLUTION INTRAVENOUS; SUBCUTANEOUS at 08:17

## 2017-05-16 RX ADMIN — HEPARIN SODIUM 5000 UNITS: 5000 INJECTION, SOLUTION INTRAVENOUS; SUBCUTANEOUS at 21:14

## 2017-05-16 RX ADMIN — Medication 325 MG: at 08:17

## 2017-05-16 RX ADMIN — ASPIRIN 81 MG: 81 TABLET, COATED ORAL at 08:17

## 2017-05-16 RX ADMIN — Medication 250 MG: at 08:17

## 2017-05-16 RX ADMIN — VITAMIN D, TAB 1000IU (100/BT) 2000 UNITS: 25 TAB at 08:16

## 2017-05-16 RX ADMIN — VANCOMYCIN HYDROCHLORIDE 1000 MG: 1 INJECTION, SOLUTION INTRAVENOUS at 09:45

## 2017-05-17 VITALS
BODY MASS INDEX: 24.01 KG/M2 | WEIGHT: 140.6 LBS | OXYGEN SATURATION: 96 % | SYSTOLIC BLOOD PRESSURE: 157 MMHG | DIASTOLIC BLOOD PRESSURE: 70 MMHG | RESPIRATION RATE: 18 BRPM | HEART RATE: 84 BPM | TEMPERATURE: 98.6 F | HEIGHT: 64 IN

## 2017-05-17 LAB
BACTERIA SPEC AEROBE CULT: NORMAL
BACTERIA SPEC AEROBE CULT: NORMAL

## 2017-05-17 PROCEDURE — G0378 HOSPITAL OBSERVATION PER HR: HCPCS

## 2017-05-17 PROCEDURE — 99217 PR OBSERVATION CARE DISCHARGE MANAGEMENT: CPT | Performed by: NURSE PRACTITIONER

## 2017-05-17 PROCEDURE — G8979 MOBILITY GOAL STATUS: HCPCS

## 2017-05-17 PROCEDURE — 97110 THERAPEUTIC EXERCISES: CPT

## 2017-05-17 PROCEDURE — G8980 MOBILITY D/C STATUS: HCPCS

## 2017-05-17 PROCEDURE — 97605 NEG PRS WND THER DME<=50SQCM: CPT

## 2017-05-17 PROCEDURE — 25010000002 HEPARIN (PORCINE) PER 1000 UNITS: Performed by: NURSE PRACTITIONER

## 2017-05-17 PROCEDURE — 96365 THER/PROPH/DIAG IV INF INIT: CPT

## 2017-05-17 PROCEDURE — 96372 THER/PROPH/DIAG INJ SC/IM: CPT

## 2017-05-17 PROCEDURE — 25010000002 VANCOMYCIN PER 500 MG

## 2017-05-17 RX ADMIN — HEPARIN SODIUM 5000 UNITS: 5000 INJECTION, SOLUTION INTRAVENOUS; SUBCUTANEOUS at 09:57

## 2017-05-17 RX ADMIN — ASPIRIN 81 MG: 81 TABLET, COATED ORAL at 09:57

## 2017-05-17 RX ADMIN — RALOXIFENE 60 MG: 60 TABLET ORAL at 11:05

## 2017-05-17 RX ADMIN — VITAMIN D, TAB 1000IU (100/BT) 2000 UNITS: 25 TAB at 09:57

## 2017-05-17 RX ADMIN — DICLOFENAC SODIUM 2 G: 10 GEL TOPICAL at 13:03

## 2017-05-17 RX ADMIN — Medication 2 TABLET: at 09:57

## 2017-05-17 RX ADMIN — VANCOMYCIN HYDROCHLORIDE 750 MG: 750 INJECTION, SOLUTION INTRAVENOUS at 11:05

## 2017-05-17 RX ADMIN — Medication 325 MG: at 09:57

## 2017-05-17 RX ADMIN — Medication 250 MG: at 09:57

## 2017-05-17 RX ADMIN — DICLOFENAC SODIUM 2 G: 10 GEL TOPICAL at 09:57

## 2017-05-19 ENCOUNTER — HOSPITAL ENCOUNTER (OUTPATIENT)
Dept: PHYSICAL THERAPY | Facility: HOSPITAL | Age: 77
Setting detail: THERAPIES SERIES
Discharge: HOME | End: 2017-05-19

## 2017-05-19 DIAGNOSIS — S51.001D ELBOW WOUND, RIGHT, SUBSEQUENT ENCOUNTER: Primary | ICD-10-CM

## 2017-05-19 PROCEDURE — G8990 OTHER PT/OT CURRENT STATUS: HCPCS

## 2017-05-19 PROCEDURE — G8991 OTHER PT/OT GOAL STATUS: HCPCS

## 2017-05-19 PROCEDURE — 97605 NEG PRS WND THER DME<=50SQCM: CPT

## 2017-05-19 PROCEDURE — 97162 PT EVAL MOD COMPLEX 30 MIN: CPT

## 2017-05-19 PROCEDURE — 97597 DBRDMT OPN WND 1ST 20 CM/<: CPT

## 2017-05-22 ENCOUNTER — HOSPITAL ENCOUNTER (OUTPATIENT)
Dept: PHYSICAL THERAPY | Facility: HOSPITAL | Age: 77
Setting detail: THERAPIES SERIES
Discharge: HOME | End: 2017-05-22

## 2017-05-22 DIAGNOSIS — S51.001D ELBOW WOUND, RIGHT, SUBSEQUENT ENCOUNTER: Primary | ICD-10-CM

## 2017-05-22 PROCEDURE — 97605 NEG PRS WND THER DME<=50SQCM: CPT

## 2017-05-24 ENCOUNTER — HOSPITAL ENCOUNTER (OUTPATIENT)
Dept: PHYSICAL THERAPY | Facility: HOSPITAL | Age: 77
Setting detail: THERAPIES SERIES
Discharge: HOME | End: 2017-05-24

## 2017-05-24 DIAGNOSIS — S51.001D ELBOW WOUND, RIGHT, SUBSEQUENT ENCOUNTER: Primary | ICD-10-CM

## 2017-05-24 PROCEDURE — 97605 NEG PRS WND THER DME<=50SQCM: CPT

## 2017-05-26 ENCOUNTER — HOSPITAL ENCOUNTER (OUTPATIENT)
Dept: PHYSICAL THERAPY | Facility: HOSPITAL | Age: 77
Setting detail: THERAPIES SERIES
Discharge: HOME | End: 2017-05-26

## 2017-05-26 DIAGNOSIS — S51.001D ELBOW WOUND, RIGHT, SUBSEQUENT ENCOUNTER: Primary | ICD-10-CM

## 2017-05-26 PROCEDURE — 97605 NEG PRS WND THER DME<=50SQCM: CPT

## 2017-05-27 ENCOUNTER — DOCUMENTATION (OUTPATIENT)
Dept: RADIATION ONCOLOGY | Facility: HOSPITAL | Age: 77
End: 2017-05-27

## 2017-05-29 ENCOUNTER — HOSPITAL ENCOUNTER (OUTPATIENT)
Dept: PHYSICAL THERAPY | Facility: HOSPITAL | Age: 77
Setting detail: THERAPIES SERIES
Discharge: HOME | End: 2017-05-29

## 2017-05-29 DIAGNOSIS — S51.001D ELBOW WOUND, RIGHT, SUBSEQUENT ENCOUNTER: Primary | ICD-10-CM

## 2017-05-29 PROCEDURE — 97605 NEG PRS WND THER DME<=50SQCM: CPT

## 2017-05-30 ENCOUNTER — TRANSCRIBE ORDERS (OUTPATIENT)
Dept: GENERAL RADIOLOGY | Facility: HOSPITAL | Age: 77
End: 2017-05-30

## 2017-05-30 ENCOUNTER — HOSPITAL ENCOUNTER (OUTPATIENT)
Dept: GENERAL RADIOLOGY | Facility: HOSPITAL | Age: 77
Discharge: HOME | End: 2017-05-30
Attending: INTERNAL MEDICINE | Admitting: INTERNAL MEDICINE
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
## 2017-05-30 DIAGNOSIS — M25.562 LEFT KNEE PAIN, UNSPECIFIED CHRONICITY: Primary | ICD-10-CM

## 2017-05-30 DIAGNOSIS — M25.562 LEFT KNEE PAIN, UNSPECIFIED CHRONICITY: ICD-10-CM

## 2017-05-30 PROCEDURE — 73560 X-RAY EXAM OF KNEE 1 OR 2: CPT

## 2017-05-31 ENCOUNTER — HOSPITAL ENCOUNTER (OUTPATIENT)
Dept: PHYSICAL THERAPY | Facility: HOSPITAL | Age: 77
Setting detail: THERAPIES SERIES
Discharge: HOME | End: 2017-05-31

## 2017-05-31 DIAGNOSIS — S51.001D ELBOW WOUND, RIGHT, SUBSEQUENT ENCOUNTER: Primary | ICD-10-CM

## 2017-05-31 PROCEDURE — 97605 NEG PRS WND THER DME<=50SQCM: CPT

## 2017-06-03 ENCOUNTER — HOSPITAL ENCOUNTER (OUTPATIENT)
Dept: PHYSICAL THERAPY | Facility: HOSPITAL | Age: 77
Setting detail: THERAPIES SERIES
Discharge: HOME | End: 2017-06-03

## 2017-06-03 DIAGNOSIS — S51.001D ELBOW WOUND, RIGHT, SUBSEQUENT ENCOUNTER: Primary | ICD-10-CM

## 2017-06-03 PROCEDURE — 97605 NEG PRS WND THER DME<=50SQCM: CPT

## 2017-06-03 NOTE — THERAPY WOUND CARE TREATMENT
Outpatient Rehabilitation - Wound/Debridement Treatment Note   [facility]     Patient Name: Yanet Grove  : 1940  MRN: [id number]  Today's Date: 6/3/2017                Admit Date: 6/3/2017    Visit Dx:    ICD-10-CM ICD-9-CM   1. 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   • Arthritis   • Osteoporosis   • Right arm cellulitis   • Foot pain, left        Past Medical History:   Diagnosis Date   • CKD (chronic kidney disease) stage 3, GFR 30-59 ml/min    • Esophageal stricture    • GERD (gastroesophageal reflux disease)    • GERD (gastroesophageal reflux disease)    • Glaucoma    • Heterotopic ossification of bone 2016    Right Elbow   • History of gastric ulcer    • HLD (hyperlipidemia)    • HTN (hypertension)    • Insomnia    • Internal hemorrhoids    • Osteoarthritis, shoulder    • Osteoporosis    • Polymyalgia rheumatica    • Pseudomonas infection 2014    Right foot wound   • Rotator cuff tear, left         Past Surgical History:   Procedure Laterality Date   • CARPAL TUNNEL RELEASE Left    • CATARACT EXTRACTION Bilateral    • COLONOSCOPY  2011   • ELBOW ARTHROTOMY Right 2017    Procedure: Right elbow radical resection capsule soft tissue heterotopic bone with contracture release;  Surgeon: Trung Bonilla MD;  Location:  [facility];  Service:    • HYSTERECTOMY      TOTAL   • REPLACEMENT TOTAL KNEE     • SKIN GRAFT Right     Foot   • TOTAL ELBOW ARTHROPLASTY Right 2016    Procedure: TOTAL ELBOW ARTHROPLASTY; ULNAR NERVE TRANSPOSITION;  Surgeon: Trung Bonilla MD;  Location:  [facility];  Service:    • ULNAR NERVE TRANSPOSITION Right    • UPPER GASTROINTESTINAL ENDOSCOPY    "        EVALUATION        PT Ortho       06/03/17 1000    Subjective Comments    Subjective Comments No complaints or changes.  -    Subjective Pain    Able to rate subjective pain? yes  -    Pre-Treatment Pain Level 0  -    Post-Treatment Pain Level 0  -    Transfers    Transfers, Sit-Stand [place] independent  -    Transfers, Stand-Sit [place] independent  -    Transfer, Comment sitting on EOB for tx  -    Gait Assessment/Treatment    Gait, [place] Level independent  -      05/31/17 1310    Subjective Comments    Subjective Comments Pt reports \"My white blood count is down to normal, so I might be able to stop the IV antibiotics soon.\"  -    Subjective Pain    Able to rate subjective pain? yes  -    Pre-Treatment Pain Level 0  -    Post-Treatment Pain Level 0  -    Subjective Pain Comment c/o min pain with placement of white foam  -    Transfers    Transfers, Sit-Stand [place] independent  -    Transfers, Stand-Sit [place] independent  -    Transfer, Comment seated in arjo chair for tx.  -    Gait Assessment/Treatment    Gait, [place] Level independent  -      User Key  (r) = Recorded By, (t) = Taken By, (c) = Cosigned By    Initials Name Provider Type     Camila Slade, PT Physical Therapist    JM Mimi Castaneda, PT Physical Therapist                    [facility] Wound       06/03/17 1000          Wound 05/12/17 1400 Right lateral elbow abrasion    Wound - Properties Group Date first assessed: 05/12/17  - Time first assessed: 1400  - Side: Right  - Orientation: lateral  - Location: elbow  - Type: abrasion  -    Wound WDL ex   drainage, min maceration  -      Dressing Appearance dry;intact  -      Base clean;moist;granulating   visible aspect of base clean and beefy red  -      Periwound Area intact;pink;pale white;dry;macerated   slight maceration  -      Edges open  -      Undermining [depth (cm)/Location] 4 cm @ 3:00, 4.5 " cm @ 2:00  -      Drainage Characteristics/Odor serous  -      Drainage Amount small  -      Wound Cleaning cleansed with;other (see comments)   phase one, swabs  -      Wound Interventions other (see comments)   NPWT  -      Periwound Care cleansed with pH balanced cleanser;barrier film applied;other (see comments)   NoSting, stomapaste, drape border  -      Therapy Setting (Negative Pressure Wound Therapy) continuous therapy  -      Pressure Setting (Negative Pressure Wound Therapy) 150 mmHg  -      Dressing (Negative Pressure Wound Therapy) foam, green;foam, white  -MC      Sponges Inserted (Negative Pressure Wound Therapy) 2   1 white, 1 green, both 1/2 width  -      Sponges Removed (Negative Pressure Wound Therapy) 2   1 white, 1 green  -      Output (mL) 30   scant, mixed with ruptured gel pack  -        User Key  (r) = Recorded By, (t) = Taken By, (c) = Cosigned By    Initials Name Provider Type     Camila Slade, PT Physical Therapist     Grazyna Cornelius, RN Registered Nurse         Finger sweep and visual inspection performed. Empty wound bed confirmed.  External label applied and verified.      WOUND DEBRIDEMENT                Recommendation and Plan        PT Assessment/Plan       06/03/17 1000       PT Assessment    Functional Limitations Limitations in functional capacity and performance;Performance in self-care ADL;Limitation in home management;Other (comment)  -     Impairments Edema;Integumentary integrity  -     Assessment Comments Slight maceration at wound edges today. Still with undermining relatively unchanged since starting treatment. Pt will continue to benefit from skilled PT wound care and NPWT to continue with wound management.  -     Rehab Potential Good  -     Patient/caregiver participated in establishment of treatment plan and goals Yes  -     Patient would benefit from skilled therapy intervention Yes  -     PT Plan    PT Frequency 3x/week   -     Physical Therapy Interventions (Optional Details) patient/family education;wound care  -     PT Plan Comments [facility] vac  -       User Key  (r) = Recorded By, (t) = Taken By, (c) = Cosigned By    Initials Name Provider Type    BERE Slade, PT Physical Therapist          Goals        PT OP Goals       06/03/17 1000       Time Calculation    PT Goal Re-Cert Due Date 06/18/17  -       User Key  (r) = Recorded By, (t) = Taken By, (c) = Cosigned By    Initials Name Provider Type    BERE Slade, PT Physical Therapist          PT Goal Re-Cert Due Date: 06/18/17            Time Calculation: Start Time: 1000    Therapy Charges for Today     Code Description Service Date Service Provider Modifiers Qty    [id number]  PT NEG PRESS WOUND TO 50SQCM DME1 6/3/2017 Camila Slade, PT  1                Camila Slade, PT  6/3/2017

## 2017-06-04 ENCOUNTER — HOSPITAL ENCOUNTER (OUTPATIENT)
Dept: PHYSICAL THERAPY | Facility: HOSPITAL | Age: 77
Setting detail: THERAPIES SERIES
Discharge: HOME | End: 2017-06-04

## 2017-06-04 DIAGNOSIS — S51.001D ELBOW WOUND, RIGHT, SUBSEQUENT ENCOUNTER: Primary | ICD-10-CM

## 2017-06-04 PROCEDURE — 97110 THERAPEUTIC EXERCISES: CPT

## 2017-06-05 ENCOUNTER — HOSPITAL ENCOUNTER (OUTPATIENT)
Dept: PHYSICAL THERAPY | Facility: HOSPITAL | Age: 77
Setting detail: THERAPIES SERIES
Discharge: HOME | End: 2017-06-05

## 2017-06-05 PROCEDURE — 97605 NEG PRS WND THER DME<=50SQCM: CPT

## 2017-06-05 NOTE — THERAPY WOUND CARE TREATMENT
Outpatient Rehabilitation - Wound/Debridement Treatment Note  [facility]     Patient Name: Yanet Grove  : 1940  MRN: [id number]  Today's Date: 2017                Admit Date: 2017    Visit Dx:  No diagnosis found.    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   • Arthritis   • Osteoporosis   • Right arm cellulitis   • Foot pain, left        Past Medical History:   Diagnosis Date   • CKD (chronic kidney disease) stage 3, GFR 30-59 ml/min    • Esophageal stricture    • GERD (gastroesophageal reflux disease)    • GERD (gastroesophageal reflux disease)    • Glaucoma    • Heterotopic ossification of bone 2016    Right Elbow   • History of gastric ulcer    • HLD (hyperlipidemia)    • HTN (hypertension)    • Insomnia    • Internal hemorrhoids    • Osteoarthritis, shoulder    • Osteoporosis    • Polymyalgia rheumatica    • Pseudomonas infection 2014    Right foot wound   • Rotator cuff tear, left         Past Surgical History:   Procedure Laterality Date   • CARPAL TUNNEL RELEASE Left    • CATARACT EXTRACTION Bilateral    • COLONOSCOPY  2011   • ELBOW ARTHROTOMY Right 2017    Procedure: Right elbow radical resection capsule soft tissue heterotopic bone with contracture release;  Surgeon: Trung Bonilla MD;  Location: [facility];  Service:    • HYSTERECTOMY      TOTAL   • REPLACEMENT TOTAL KNEE     • SKIN GRAFT Right     Foot   • TOTAL ELBOW ARTHROPLASTY Right 2016    Procedure: TOTAL ELBOW ARTHROPLASTY; ULNAR NERVE TRANSPOSITION;  Surgeon: Trung Bonilla MD;  Location:  [facility];  Service:    • ULNAR NERVE TRANSPOSITION Right 2016   • UPPER GASTROINTESTINAL ENDOSCOPY  2013         EVALUATION        PT Ortho       17 1040    Subjective Comments     Subjective Comments No complaints or changes. Pt apologized for being late, she had to wait in the lab for blood draws  -    Subjective Pain    Able to rate subjective pain? yes  -MC    Pre-Treatment Pain Level 0  -MC    Post-Treatment Pain Level 0  -MC    Transfers    Transfers, Sit-Stand [place] independent  -MC    Transfers, Stand-Sit [place] independent  -MC    Transfer, Comment seated in a chair for tx  -MC    Gait Assessment/Treatment    Gait, [place] Level independent  -MC      06/04/17 1015    Subjective Comments    Subjective Comments Pt dropped the wound vac from a tall stool onto a tile floor. Her son attempted to tape the connection at the canister. The machine was keeping pressure but the connection was not stable. Pt presented her after her infusion to evaluate the machine and her dressing.  -MC    Subjective Pain    Able to rate subjective pain? yes  -MC    Pre-Treatment Pain Level 0  -MC    Post-Treatment Pain Level 0  -MC    Transfers    Transfers, Sit-Stand [place] independent  -MC    Transfers, Stand-Sit [place] independent  -MC    Transfer, Comment sitting on EOB for tx  -MC    Gait Assessment/Treatment    Gait, [place] Level independent  -MC      06/03/17 1000    Subjective Comments    Subjective Comments No complaints or changes.  -MC    Subjective Pain    Able to rate subjective pain? yes  -MC    Pre-Treatment Pain Level 0  -MC    Post-Treatment Pain Level 0  -MC    Transfers    Transfers, Sit-Stand [place] independent  -MC    Transfers, Stand-Sit [place] independent  -MC    Transfer, Comment sitting on EOB for tx  -MC    Gait Assessment/Treatment    Gait, [place] Level independent  -      User Key  (r) = Recorded By, (t) = Taken By, (c) = Cosigned By    Initials Name Provider Type    BERE Slade, PT Physical Therapist                    [facility] Wound       06/05/17 1040          Wound 05/12/17 1400 Right lateral elbow abrasion    Wound -  Properties Group Date first assessed: 05/12/17  [facility] Time first assessed: 1400  - Side: Right  - Orientation: lateral  - Location: elbow  - Type: abrasion  -    Wound WDL ex  -      Dressing Appearance dry;intact  -      Base epithelialization;clean;moist;reddened;granulating  -      Periwound Area intact;pink;dry  -      Edges open  -      Undermining [depth (cm)/Location] 4 cm @ 3:00, 4.5 cm @ 2:00  -      Drainage Characteristics/Odor serous  -      Drainage Amount small  -      Wound Cleaning cleansed with;other (see comments)   phase one, swabs  -      Wound Interventions other (see comments)   NPWT  -      Dressing other (see comments)   NPWT  -      Periwound Care cleansed with pH balanced cleanser;dry periwound area maintained;barrier film applied;other (see comments)   NoSting, stoma paste, drape border  -      Therapy Setting (Negative Pressure Wound Therapy) continuous therapy  -      Pressure Setting (Negative Pressure Wound Therapy) 150 mmHg  -      Dressing (Negative Pressure Wound Therapy) foam, green;foam, white  -MC      Sponges Inserted (Negative Pressure Wound Therapy) 2   1 white, 1 green, both half-width  -MC      Sponges Removed (Negative Pressure Wound Therapy) 2   1 white, 1 green  -      Output (mL) 0   No drainage since canister change yesterday  -        User Key  (r) = Recorded By, (t) = Taken By, (c) = Cosigned By    Initials Name Provider Type    BERE Slade, PT Physical Therapist     Grazyna Cornelius, RN Registered Nurse         Finger sweep and visual inspection performed. Empty wound bed confirmed.  External label applied and verified.      WOUND DEBRIDEMENT                Recommendation and Plan        PT Assessment/Plan       06/05/17 1040       PT Assessment    Functional Limitations Limitations in functional capacity and performance;Performance in self-care ADL;Limitation in home management;Other (comment)  -     Impairments  Edema;Integumentary integrity  -     Assessment Comments Depth of undermining unchanged since last measurement. The opening to the wound continues to narrow and the undermined space is much more difficult to pack since beginning treatment. Pt still with small amount of yellow/orange drainage that can be expressed from the depths of the wound.  -     Rehab Potential Good  -     Patient/caregiver participated in establishment of treatment plan and goals Yes  -     Patient would benefit from skilled therapy intervention Yes  -     PT Plan    Predicted Duration of Therapy Intervention (days/wks) 4-6 weeks  -     Physical Therapy Interventions (Optional Details) patient/family education;wound care  -     PT Plan Comments [facility] vac  -       User Key  (r) = Recorded By, (t) = Taken By, (c) = Cosigned By    Initials Name Provider Type    BERE Slade, PT Physical Therapist          Goals        PT OP Goals       06/05/17 1040       Time Calculation    PT Goal Re-Cert Due Date 06/18/17  -       User Key  (r) = Recorded By, (t) = Taken By, (c) = Cosigned By    Initials Name Provider Type     Camila Slade, PT Physical Therapist          PT Goal Re-Cert Due Date: 06/18/17            Time Calculation: Start Time: 1040    Therapy Charges for Today     Code Description Service Date Service Provider Modifiers Qty    [id number] HC PT NEG PRESS WOUND TO 50SQCM DME1 6/5/2017 Camila Slade, PT  1                Camila Slade, PT  6/5/2017

## 2017-06-07 ENCOUNTER — HOSPITAL ENCOUNTER (OUTPATIENT)
Dept: PHYSICAL THERAPY | Facility: HOSPITAL | Age: 77
Setting detail: THERAPIES SERIES
Discharge: HOME | End: 2017-06-07

## 2017-06-07 DIAGNOSIS — S51.001D ELBOW WOUND, RIGHT, SUBSEQUENT ENCOUNTER: Primary | ICD-10-CM

## 2017-06-07 PROCEDURE — 97110 THERAPEUTIC EXERCISES: CPT

## 2017-06-07 NOTE — THERAPY WOUND CARE TREATMENT
Outpatient Rehabilitation - Wound/Debridement Treatment Note   Marylin     Patient Name: Yanet Grove  : 1940  MRN: [id number]  Today's Date: 2017                Admit Date: 2017    Visit Dx:    ICD-10-CM ICD-9-CM   1. 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   • Arthritis   • Osteoporosis   • Right arm cellulitis   • Foot pain, left        Past Medical History:   Diagnosis Date   • CKD (chronic kidney disease) stage 3, GFR 30-59 ml/min    • Esophageal stricture    • GERD (gastroesophageal reflux disease)    • GERD (gastroesophageal reflux disease)    • Glaucoma    • Heterotopic ossification of bone     Right Elbow   • History of gastric ulcer    • HLD (hyperlipidemia)    • HTN (hypertension)    • Insomnia    • Internal hemorrhoids    • Osteoarthritis, shoulder    • Osteoporosis    • Polymyalgia rheumatica    • Pseudomonas infection 2014    Right foot wound   • Rotator cuff tear, left         Past Surgical History:   Procedure Laterality Date   • CARPAL TUNNEL RELEASE Left    • CATARACT EXTRACTION Bilateral    • COLONOSCOPY  2011   • ELBOW ARTHROTOMY Right 2017    Procedure: Right elbow radical resection capsule soft tissue heterotopic bone with contracture release;  Surgeon: Trung Bonilla MD;  Location:  [facility];  Service:    • HYSTERECTOMY      TOTAL   • REPLACEMENT TOTAL KNEE     • SKIN GRAFT Right     Foot   • TOTAL ELBOW ARTHROPLASTY Right 2016    Procedure: TOTAL ELBOW ARTHROPLASTY; ULNAR NERVE TRANSPOSITION;  Surgeon: Trung Bonilla MD;  Location:  [facility];  Service:    • ULNAR NERVE TRANSPOSITION Right    • UPPER GASTROINTESTINAL ENDOSCOPY            EVALUATION        PT Ortho       06/07/17 1035    Subjective Comments    Subjective Comments No complaints or changes. Reports Dr. Bonilla told her to tell the PTs not to probe the wound, but she didn't give any other information, and only told the PT after the wound had already been assessed with swabs  -    Subjective Pain    Able to rate subjective pain? yes  -MC    Pre-Treatment Pain Level 0  -MC    Post-Treatment Pain Level 0  -MC    Transfers    Transfers, Sit-Stand [place] independent  -MC    Transfers, Stand-Sit [place] independent  -MC    Transfer, Comment Seated EOB for tx  -MC    Gait Assessment/Treatment    Gait, [place] Level independent  -MC      06/05/17 1040    Subjective Comments    Subjective Comments No complaints or changes. Pt apologized for being late, she had to wait in the lab for blood draws  -    Subjective Pain    Able to rate subjective pain? yes  -MC    Pre-Treatment Pain Level 0  -MC    Post-Treatment Pain Level 0  -MC    Transfers    Transfers, Sit-Stand [place] independent  -MC    Transfers, Stand-Sit [place] independent  -MC    Transfer, Comment seated in a chair for tx  -    Gait Assessment/Treatment    Gait, [place] Level independent  -      User Key  (r) = Recorded By, (t) = Taken By, (c) = Cosigned By    Initials Name Provider Type    BERE Slade, PT Physical Therapist                    [facility] Wound       06/07/17 1035          Wound 05/12/17 1400 Right lateral elbow abrasion    Wound - Properties Group Date first assessed: 05/12/17  - Time first assessed: 1400  - Side: Right  - Orientation: lateral  [facility] Location: elbow  - Type: abrasion  -    Wound WDL ex  -MC      Dressing Appearance dry;intact  -      Base epithelialization;clean;moist;reddened;granulating  -      Periwound Area intact;pink;dry  -      Edges open  -      Undermining [depth (cm)/Location] 4 cm @ 2:00, 3.2 cm @ 3:00, 3.9 cm @ 4:00  -       "Drainage Characteristics/Odor serous  -      Drainage Amount small  -      Wound Cleaning cleansed with;other (see comments)   phase one, swabs  -      Dressing Dressing applied;low-adherent;foam   4\" optifoam gentle  -      Packing other (see comments)   hydrofera blue ready transfer, cut into ribbon  -      Periwound Care cleansed with pH balanced cleanser;dry periwound area maintained  -      Therapy Setting (Negative Pressure Wound Therapy) --   Vac therapy on HOLD to assess need  -      Sponges Removed (Negative Pressure Wound Therapy) 2   1 green, 1 white  -      Output (mL) 1   very scant drainage, gel pack not ruptured  -        User Key  (r) = Recorded By, (t) = Taken By, (c) = Cosigned By    Initials Name Provider Type     Camila Slade, PT Physical Therapist     Grazyna Cornelius, RN Registered Nurse         Finger sweep and visual inspection performed. Empty wound bed confirmed.      WOUND DEBRIDEMENT                Recommendation and Plan        PT Assessment/Plan       06/07/17 1035       PT Assessment    Functional Limitations Limitations in functional capacity and performance;Performance in self-care ADL;Limitation in home management;Other (comment)  -     Impairments Edema;Integumentary integrity  -     Assessment Comments Depth of undermining decreased since last visit. Very scant drainage in the canister, and minimal drainage expressed with manual pressure after foam removal. Switched to hydrofera blue READY Transfer dressing to attempt to better wick the exudate material from the depth of the wound. The opening to the wound continues to decrease in size, making packing more difficult. However, packing is still required to avoid abscess development should the opening close too early. Pt gave PT message from Dr. Bonilla re: not probing the wound. However, measuring the depth of the wound is an important component of wound management by a wound care professional. PTs " will continue to measure the depth but will take care when doing so.  -     Rehab Potential Good  -     Patient/caregiver participated in establishment of treatment plan and goals Yes  -     Patient would benefit from skilled therapy intervention Yes  -     PT Plan    PT Frequency 3x/week  -     Physical Therapy Interventions (Optional Details) patient/family education;wound care  -     PT Plan Comments Trial of HFB ready transfer/optifoam. Assess continued need for apria vac.  -       User Key  (r) = Recorded By, (t) = Taken By, (c) = Cosigned By    Initials Name Provider Type     Camila Slade, PT Physical Therapist          Goals        PT OP Goals       06/07/17 1035       Time Calculation    PT Goal Re-Cert Due Date 06/18/17  -       User Key  (r) = Recorded By, (t) = Taken By, (c) = Cosigned By    Initials Name Provider Type    BERE Slade, PT Physical Therapist          PT Goal Re-Cert Due Date: 06/18/17            Time Calculation: Start Time: 1035    Therapy Charges for Today     Code Description Service Date Service Provider Modifiers Qty    80457948029 HC PT THER PROC EA 15 MIN 6/7/2017 Camila Slade, PT GP 2                Camila Slade, PT  6/7/2017

## 2017-06-09 ENCOUNTER — HOSPITAL ENCOUNTER (OUTPATIENT)
Dept: PHYSICAL THERAPY | Facility: HOSPITAL | Age: 77
Setting detail: THERAPIES SERIES
Discharge: HOME | End: 2017-06-09

## 2017-06-09 DIAGNOSIS — S51.001D ELBOW WOUND, RIGHT, SUBSEQUENT ENCOUNTER: Primary | ICD-10-CM

## 2017-06-09 PROCEDURE — 97602 WOUND(S) CARE NON-SELECTIVE: CPT

## 2017-06-09 NOTE — THERAPY WOUND CARE TREATMENT
Outpatient Rehabilitation - Wound/Debridement Treatment Note   Marylin     Patient Name: Yanet Grove  : 1940  MRN: [id number]  Today's Date: 2017                Admit Date: 2017    Visit Dx:    ICD-10-CM ICD-9-CM   1. 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   • Arthritis   • Osteoporosis   • Right arm cellulitis   • Foot pain, left        Past Medical History:   Diagnosis Date   • CKD (chronic kidney disease) stage 3, GFR 30-59 ml/min    • Esophageal stricture    • GERD (gastroesophageal reflux disease)    • GERD (gastroesophageal reflux disease)    • Glaucoma    • Heterotopic ossification of bone 2016    Right Elbow   • History of gastric ulcer    • HLD (hyperlipidemia)    • HTN (hypertension)    • Insomnia    • Internal hemorrhoids    • Osteoarthritis, shoulder    • Osteoporosis    • Polymyalgia rheumatica    • Pseudomonas infection 2014    Right foot wound   • Rotator cuff tear, left         Past Surgical History:   Procedure Laterality Date   • CARPAL TUNNEL RELEASE Left    • CATARACT EXTRACTION Bilateral    • COLONOSCOPY  2011   • ELBOW ARTHROTOMY Right 2017    Procedure: Right elbow radical resection capsule soft tissue heterotopic bone with contracture release;  Surgeon: Trung Bonilla MD;  Location:  [facility];  Service:    • HYSTERECTOMY      TOTAL   • REPLACEMENT TOTAL KNEE     • SKIN GRAFT Right     Foot   • TOTAL ELBOW ARTHROPLASTY Right 2016    Procedure: TOTAL ELBOW ARTHROPLASTY; ULNAR NERVE TRANSPOSITION;  Surgeon: Trung Bonilla MD;  Location:  [facility];  Service:    • ULNAR NERVE TRANSPOSITION Right    • UPPER GASTROINTESTINAL ENDOSCOPY            EVALUATION        PT Ortho       06/09/17 1045    Subjective Comments    Subjective Comments Some drainage on outer dressing that she changed at home  -ES    Subjective Pain    Able to rate subjective pain? yes  -ES    Pre-Treatment Pain Level 0  -ES    Post-Treatment Pain Level 0  -ES    Transfers    Transfers, Sit-Stand [place] independent  -ES    Transfers, Stand-Sit [place] independent  -ES    Transfer, Comment seated EOB for treatment  -ES    Gait Assessment/Treatment    Gait, [place] Level independent  -ES      06/07/17 1035    Subjective Comments    Subjective Comments No complaints or changes. Reports Dr. Bonilla told her to tell the PTs not to probe the wound, but she didn't give any other information, and only told the PT after the wound had already been assessed with swabs  -MC    Subjective Pain    Able to rate subjective pain? yes  -MC    Pre-Treatment Pain Level 0  -MC    Post-Treatment Pain Level 0  -MC    Transfers    Transfers, Sit-Stand [place] independent  -MC    Transfers, Stand-Sit [place] independent  -MC    Transfer, Comment Seated EOB for tx  -MC    Gait Assessment/Treatment    Gait, [place] Level independent  -MC      User Key  (r) = Recorded By, (t) = Taken By, (c) = Cosigned By    Initials Name Provider Type    ES Fide Rogel, PT Physical Therapist     Camila Slade, PT Physical Therapist                    [facility] Wound       06/09/17 1045          Wound 05/12/17 1400 Right lateral elbow abrasion    Wound - Properties Group Date first assessed: 05/12/17  - Time first assessed: 1400  - Side: Right  - Orientation: lateral  -[facility] Location: elbow  - Type: abrasion  -    Wound WDL ex  -ES      Dressing Appearance intact;dried drainage  -ES      Base epithelialization;clean;moist;reddened;granulating  -ES      Periwound Area intact;pink;dry  -ES      Edges open  -ES      Length (cm) 0.3  -ES      Width (cm) 0.2  -ES      Depth (cm) 0.2  -ES       "Undermining [depth (cm)/Location] 4.9 @2:00  -ES      Drainage Characteristics/Odor yellow;no odor  -ES      Drainage Amount large   fluid expressed with manual overpressure, flushed wound with phase one and provided manual pressure again  -ES      Wound Cleaning irrigated with;other (see comments)   phase one, swabs  -ES      Wound Interventions debrided  -ES      Dressing Dressing applied;low-adherent;foam   Ag foam, 6\"optifoam border  -ES      Packing gauze, antimicrobial   cutimed sorbact cut into ribbon  -ES      Periwound Care cleansed with pH balanced cleanser;dry periwound area maintained  -ES        User Key  (r) = Recorded By, (t) = Taken By, (c) = Cosigned By    Initials Name Provider Type    ES Fide Rogel, PT Physical Therapist     Grazyna Cornelius, RN Registered Nurse            WOUND DEBRIDEMENT  Debridement Site 1  Location- Site 1: RUE wounds  Selective Debridement- Site 1:  (Nonselective debridement with swabs)             Recommendation and Plan        PT Assessment/Plan       06/09/17 1045       PT Assessment    Functional Limitations Limitations in functional capacity and performance;Performance in self-care ADL;Limitation in home management;Other (comment)  -ES     Impairments Edema;Integumentary integrity  -ES     Assessment Comments With removal of hydrofera, large fluid build up with subsequent manual expressinon of thick yellow fluid. Depth of undermining also increased. Adjusted packing materal to cutimed sorbact in effort to provide wicking property with less material while still providing antimicrobial. Concern that VAC foam density would also limit expression of fluid so will continue to HOLD till next visit to see response. MD appointments scheduled for next week.  -ES     PT Plan    Physical Therapy Interventions (Optional Details) patient/family education;wound care  -ES     PT Plan Comments pending discontinuation of VAC with assessment of cutimed use  -ES       User Key  (r) " = Recorded By, (t) = Taken By, (c) = Cosigned By    Initials Name Provider Type    ES Fide Rogel, PT Physical Therapist          Goals        PT OP Goals       06/09/17 1045       Time Calculation    PT Goal Re-Cert Due Date 06/18/17  -       User Key  (r) = Recorded By, (t) = Taken By, (c) = Cosigned By    Initials Name Provider Type    AMADA Rogel, PT Physical Therapist          PT Goal Re-Cert Due Date: 06/18/17            Time Calculation: Start Time: 1045    Therapy Charges for Today     Code Description Service Date Service Provider Modifiers Qty    98204801918 HC NONSELECTIVE DEBRIDEMENT 6/9/2017 iFde Rogel, PT GP 1                Fide Rogel, PT  6/9/2017

## 2017-06-12 ENCOUNTER — HOSPITAL ENCOUNTER (OUTPATIENT)
Dept: PHYSICAL THERAPY | Facility: HOSPITAL | Age: 77
Setting detail: THERAPIES SERIES
Discharge: HOME | End: 2017-06-12

## 2017-06-12 DIAGNOSIS — S51.001D ELBOW WOUND, RIGHT, SUBSEQUENT ENCOUNTER: Primary | ICD-10-CM

## 2017-06-12 PROCEDURE — 97602 WOUND(S) CARE NON-SELECTIVE: CPT | Performed by: PHYSICAL THERAPIST

## 2017-06-12 NOTE — THERAPY TREATMENT NOTE
Outpatient Rehabilitation - Wound/Debridement Treatment Note   Marylin     Patient Name: Yanet Grove  : 1940  MRN: [id number]  Today's Date: 2017                Admit Date: 2017    Visit Dx:    ICD-10-CM ICD-9-CM   1. 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   • Arthritis   • Osteoporosis   • Right arm cellulitis   • Foot pain, left        Past Medical History:   Diagnosis Date   • CKD (chronic kidney disease) stage 3, GFR 30-59 ml/min    • Esophageal stricture    • GERD (gastroesophageal reflux disease)    • GERD (gastroesophageal reflux disease)    • Glaucoma    • Heterotopic ossification of bone 2016    Right Elbow   • History of gastric ulcer    • HLD (hyperlipidemia)    • HTN (hypertension)    • Insomnia    • Internal hemorrhoids    • Osteoarthritis, shoulder    • Osteoporosis    • Polymyalgia rheumatica    • Pseudomonas infection 2014    Right foot wound   • Rotator cuff tear, left         Past Surgical History:   Procedure Laterality Date   • CARPAL TUNNEL RELEASE Left    • CATARACT EXTRACTION Bilateral    • COLONOSCOPY  2011   • ELBOW ARTHROTOMY Right 2017    Procedure: Right elbow radical resection capsule soft tissue heterotopic bone with contracture release;  Surgeon: Trung Bonilla MD;  Location:  [facility];  Service:    • HYSTERECTOMY      TOTAL   • REPLACEMENT TOTAL KNEE     • SKIN GRAFT Right     Foot   • TOTAL ELBOW ARTHROPLASTY Right 2016    Procedure: TOTAL ELBOW ARTHROPLASTY; ULNAR NERVE TRANSPOSITION;  Surgeon: Trung Bonilla MD;  Location:  [facility];  Service:    • ULNAR NERVE TRANSPOSITION Right    • UPPER GASTROINTESTINAL ENDOSCOPY             "     PT Ortho       06/12/17 1035    Subjective Comments    Subjective Comments pt states she sees Dr. Bonilla tomorrow and ID on Wednesday.   -MW    Subjective Pain    Able to rate subjective pain? yes  -MW    Pre-Treatment Pain Level 0  -MW    Post-Treatment Pain Level 0  -MW    Subjective Pain Comment a little tender with attempting to push swab into wound   -MW    Transfers    Transfers, Sit-Stand [place] independent  -MW    Transfers, Stand-Sit [place] independent  -MW    Transfer, Comment seated in chair with pillow on lap to support RUE   -MW    Gait Assessment/Treatment    Gait, [place] Level independent  -MW      User Key  (r) = Recorded By, (t) = Taken By, (c) = Cosigned By    Initials Name Provider Type    MW Angela Arizmendi, PT Physical Therapist                    [facility] Wound       06/12/17 1035          Wound 05/12/17 1400 Right lateral elbow abrasion    Wound - Properties Group Date first assessed: 05/12/17  - Time first assessed: 1400  -[facility] Side: Right  - Orientation: lateral  - Location: elbow  - Type: abrasion  -[facility]    Wound WDL ex  -MW      Dressing Appearance intact;moist drainage  -MW      Base epithelialization;clean;moist;granulating  -MW      Periwound Area intact;pink;dry  -MW      Edges open  -MW      Length (cm) 0.3  -MW      Width (cm) 0.2  -MW      Depth (cm) 0.1   then tunnels   -MW      Undermining [depth (cm)/Location] ~ 3.5 cm at 2:00  -MW      Drainage Characteristics/Odor serous;yellow;serosanguineous;no odor   thick, glistening drainage (? synovial fluid)   -MW      Drainage Amount moderate   drained out after packing removal; expressed more with manual pressure   -MW      Wound Cleaning irrigated with;other (see comments)   Phase one to single layer of gauze; unable to push swab in   -MW      Dressing Dressing applied;antimicrobial textile;silver impregnated dressing;low-adherent;foam   Mepilex [employer], 6\" optifoam   -MW      Packing gauze, antimicrobial;other (see " comments)   Cutimed sorbact cut into ribbon   -      Periwound Care cleansed with pH balanced cleanser;dry periwound area maintained   Theraworx wipe   -        User Key  (r) = Recorded By, (t) = Taken By, (c) = Cosigned By    Initials Name Provider Type    RUBENS Arizmendi, PT Physical Therapist    ANEESH Cornelius, RN Registered Nurse            WOUND DEBRIDEMENT  Debridement Site 1  Location- Site 1: RUE wounds  Selective Debridement- Site 1:  (Nonselective debridement with gauze/ stick of swab )             Recommendation and Plan        PT Assessment/Plan       06/12/17 1035       PT Assessment    Functional Limitations Limitations in functional capacity and performance;Performance in self-care ADL;Limitation in home management;Other (comment)  -MW     Impairments Edema;Integumentary integrity  -MW     Assessment Comments Wound too small to insert cotton tip of swab this visit, but still draining moderate amount of clear, but moderately thick yellow drainage; possibly partially synovial fluid vs wound drainage. Wound too small to continue NPWT at this time. Only able to pack wound with thin antimicrobial textile at this time, but depth continues to be greater than 3 cm deep. Cont PT wound care or per MD plans as pt has MD follow up prior to next PT visit.   -MW     Rehab Potential Good  -MW     Patient/caregiver participated in establishment of treatment plan and goals Yes  -MW     Patient would benefit from skilled therapy intervention Yes  -MW     PT Plan    PT Frequency 3x/week;2x/week  -MW     Physical Therapy Interventions (Optional Details) wound care;patient/family education  -     PT Plan Comments cont Cutimed sorbact use; unless MD enlarges wound opening   -       User Key  (r) = Recorded By, (t) = Taken By, (c) = Cosigned By    Initials Name Provider Type    RUBENS Arizmendi, PT Physical Therapist          Goals        PT OP Goals       06/12/17 1244       Time Calculation    PT  Goal Re-Cert Due Date 06/18/17  -RUBENS       User Key  (r) = Recorded By, (t) = Taken By, (c) = Cosigned By    Initials Name Provider Type    RUBENS Arizmendi, PT Physical Therapist          PT Goal Re-Cert Due Date: 06/18/17            Time Calculation: Start Time: 1035    Therapy Charges for Today     Code Description Service Date Service Provider Modifiers Qty    [id number] HC NONSELECTIVE DEBRIDEMENT 6/12/2017 Angela Arizmendi, PT GP 1                Angela Arizmendi, PT  6/12/2017

## 2017-06-14 ENCOUNTER — HOSPITAL ENCOUNTER (OUTPATIENT)
Dept: PHYSICAL THERAPY | Facility: HOSPITAL | Age: 77
Setting detail: THERAPIES SERIES
Discharge: HOME | End: 2017-06-14

## 2017-06-14 DIAGNOSIS — S51.001D ELBOW WOUND, RIGHT, SUBSEQUENT ENCOUNTER: Primary | ICD-10-CM

## 2017-06-14 PROCEDURE — 97110 THERAPEUTIC EXERCISES: CPT

## 2017-06-14 NOTE — THERAPY WOUND CARE TREATMENT
Outpatient Rehabilitation - Wound/Debridement Progress Note   Marylin     Patient Name: Yanet Grove  : 1940  MRN: [id number]  Today's Date: 2017                Admit Date: 2017    Visit Dx:    ICD-10-CM ICD-9-CM   1. 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   • Arthritis   • Osteoporosis   • Right arm cellulitis   • Foot pain, left        Past Medical History:   Diagnosis Date   • CKD (chronic kidney disease) stage 3, GFR 30-59 ml/min    • Esophageal stricture    • GERD (gastroesophageal reflux disease)    • GERD (gastroesophageal reflux disease)    • Glaucoma    • Heterotopic ossification of bone     Right Elbow   • History of gastric ulcer    • HLD (hyperlipidemia)    • HTN (hypertension)    • Insomnia    • Internal hemorrhoids    • Osteoarthritis, shoulder    • Osteoporosis    • Polymyalgia rheumatica    • Pseudomonas infection     Right foot wound   • Rotator cuff tear, left         Past Surgical History:   Procedure Laterality Date   • CARPAL TUNNEL RELEASE Left    • CATARACT EXTRACTION Bilateral    • COLONOSCOPY  2011   • ELBOW ARTHROTOMY Right 2017    Procedure: Right elbow radical resection capsule soft tissue heterotopic bone with contracture release;  Surgeon: Trung Bonilla MD;  Location:  [facility];  Service:    • HYSTERECTOMY      TOTAL   • REPLACEMENT TOTAL KNEE     • SKIN GRAFT Right     Foot   • TOTAL ELBOW ARTHROPLASTY Right 2016    Procedure: TOTAL ELBOW ARTHROPLASTY; ULNAR NERVE TRANSPOSITION;  Surgeon: Trung Bonilla MD;  Location:  [facility];  Service:    • ULNAR NERVE TRANSPOSITION Right    • UPPER GASTROINTESTINAL ENDOSCOPY            EVALUATION        PT Ortho       06/14/17 1115    Subjective Comments    Subjective Comments Pt with no compliants today  -MF    Subjective Pain    Able to rate subjective pain? yes  -MF    Pre-Treatment Pain Level 0  -MF    Post-Treatment Pain Level 0  -MF    Transfers    Transfers, Sit-Stand [place] independent  -MF    Transfers, Stand-Sit [place] independent  -MF    Transfer, Comment seated in chair  -MF    Gait Assessment/Treatment    Gait, [place] Level independent  -MF      06/12/17 1035    Subjective Comments    Subjective Comments pt states she sees Dr. Bonilla tomorrow and ID on Wednesday.   -MW    Subjective Pain    Able to rate subjective pain? yes  -MW    Pre-Treatment Pain Level 0  -MW    Post-Treatment Pain Level 0  -MW    Subjective Pain Comment a little tender with attempting to push swab into wound   -MW    Transfers    Transfers, Sit-Stand [place] independent  -MW    Transfers, Stand-Sit [place] independent  -MW    Transfer, Comment seated in chair with pillow on lap to support RUE   -MW    Gait Assessment/Treatment    Gait, [place] Level independent  -MW      User Key  (r) = Recorded By, (t) = Taken By, (c) = Cosigned By    Initials Name Provider Type     Eduard Calhoun, PT Physical Therapist     Angela Arizmendi, PT Physical Therapist                    [facility] Wound       06/14/17 1115          Wound 05/12/17 1400 Right lateral elbow abrasion    Wound - Properties Group Date first assessed: 05/12/17  - Time first assessed: 1400  - Side: Right  - Orientation: lateral  - Location: elbow  - Type: abrasion  -    Wound WDL ex  -MF      Dressing Appearance intact;moist drainage  -MF      Base epithelialization;clean;moist;granulating  -MF      Periwound Area intact;pink;dry  -MF      Edges open  -MF      Drainage Characteristics/Odor serous;yellow;serosanguineous;no odor  -MF      Drainage Amount moderate  -MF      Wound Cleaning cleansed with;other  (see comments)   phase one  -      Dressing low-adherent;foam   large optifoam to cover  -      Packing Incision packed with   sorbact  -      Periwound Care cleansed with pH balanced cleanser  -        User Key  (r) = Recorded By, (t) = Taken By, (c) = Cosigned By    Initials Name Provider Type     Eduard Calhoun, PT Physical Therapist     Grazyna Cornelius RN Registered Nurse            WOUND DEBRIDEMENT                Recommendation and Plan        PT Assessment/Plan       06/14/17 1115       PT Assessment    Functional Limitations Limitations in functional capacity and performance;Performance in self-care ADL;Limitation in home management;Other (comment)  -     Impairments Integumentary integrity  -     Assessment Comments wound size cont to decrease with no issues noted.  no s/s of infection, but wound cont to have moderate exudate. PT will cont to pack wound with sorbact to help decrease bioburden and facilitate drainage.   -     Rehab Potential Good  -     Patient/caregiver participated in establishment of treatment plan and goals Yes  -     Patient would benefit from skilled therapy intervention Yes  -     PT Plan    PT Frequency 3x/week  -     Physical Therapy Interventions (Optional Details) wound care;patient/family education  -     PT Plan Comments cont with 2-3 x/week dressing management with pt to f/u with MD next week.   -       User Key  (r) = Recorded By, (t) = Taken By, (c) = Cosigned By    Initials Name Provider Type    FIONA Calhoun, PT Physical Therapist          Goals        PT OP Goals       06/14/17 1115       Time Calculation    PT Goal Re-Cert Due Date 07/14/17  -       User Key  (r) = Recorded By, (t) = Taken By, (c) = Cosigned By    Initials Name Provider Type    FIONA Calhoun, PT Physical Therapist          PT Goal Re-Cert Due Date: 07/14/17            Time Calculation: Start Time: 1115  Total Timed Code Minutes- PT: 25  minute(s)    Therapy Charges for Today     Code Description Service Date Service Provider Modifiers Qty    04610342111 HC PT THER PROC EA 15 MIN 6/14/2017 Eduard Calhoun, PT GP 2                Eduard Calhoun, PT  6/14/2017

## 2017-06-16 ENCOUNTER — HOSPITAL ENCOUNTER (OUTPATIENT)
Dept: PHYSICAL THERAPY | Facility: HOSPITAL | Age: 77
Setting detail: THERAPIES SERIES
Discharge: HOME | End: 2017-06-16

## 2017-06-16 DIAGNOSIS — S51.001D ELBOW WOUND, RIGHT, SUBSEQUENT ENCOUNTER: Primary | ICD-10-CM

## 2017-06-16 PROCEDURE — 97602 WOUND(S) CARE NON-SELECTIVE: CPT

## 2017-06-16 NOTE — THERAPY WOUND CARE TREATMENT
Outpatient Rehabilitation - Wound/Debridement Treatment Note   Marylin     Patient Name: Yanet Grove  : 1940  MRN: [id number]  Today's Date: 2017                Admit Date: 2017    Visit Dx:    ICD-10-CM ICD-9-CM   1. 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   • Arthritis   • Osteoporosis   • Right arm cellulitis   • Foot pain, left        Past Medical History:   Diagnosis Date   • CKD (chronic kidney disease) stage 3, GFR 30-59 ml/min    • Esophageal stricture    • GERD (gastroesophageal reflux disease)    • GERD (gastroesophageal reflux disease)    • Glaucoma    • Heterotopic ossification of bone 2016    Right Elbow   • History of gastric ulcer    • HLD (hyperlipidemia)    • HTN (hypertension)    • Insomnia    • Internal hemorrhoids    • Osteoarthritis, shoulder    • Osteoporosis    • Polymyalgia rheumatica    • Pseudomonas infection 2014    Right foot wound   • Rotator cuff tear, left         Past Surgical History:   Procedure Laterality Date   • CARPAL TUNNEL RELEASE Left    • CATARACT EXTRACTION Bilateral    • COLONOSCOPY  2011   • ELBOW ARTHROTOMY Right 2017    Procedure: Right elbow radical resection capsule soft tissue heterotopic bone with contracture release;  Surgeon: Trung Bonilla MD;  Location:  [facility];  Service:    • HYSTERECTOMY      TOTAL   • REPLACEMENT TOTAL KNEE     • SKIN GRAFT Right     Foot   • TOTAL ELBOW ARTHROPLASTY Right 2016    Procedure: TOTAL ELBOW ARTHROPLASTY; ULNAR NERVE TRANSPOSITION;  Surgeon: Trung Bonilla MD;  Location:  [facility];  Service:    • ULNAR NERVE TRANSPOSITION Right    • UPPER GASTROINTESTINAL ENDOSCOPY            EVALUATION        PT Ortho       06/16/17 1400    Subjective Comments    Subjective Comments MD pleased with progress though wants to make sure that wound remains open till depth is gone  -ES    Subjective Pain    Able to rate subjective pain? yes  -ES    Pre-Treatment Pain Level 0  -ES    Post-Treatment Pain Level 0  -ES    Transfers    Transfers, Sit-Stand [place] independent  -ES    Transfers, Stand-Sit [place] independent  -ES    Transfer, Comment seated EOB with limb supported on pillow  -ES    Gait Assessment/Treatment    Gait, [place] Level independent  -ES      06/14/17 1115    Subjective Comments    Subjective Comments Pt with no compliants today  -MF    Subjective Pain    Able to rate subjective pain? yes  -MF    Pre-Treatment Pain Level 0  -MF    Post-Treatment Pain Level 0  -MF    Transfers    Transfers, Sit-Stand [place] independent  -MF    Transfers, Stand-Sit [place] independent  -MF    Transfer, Comment seated in chair  -MF    Gait Assessment/Treatment    Gait, [place] Level independent  -MF      User Key  (r) = Recorded By, (t) = Taken By, (c) = Cosigned By    Initials Name Provider Type     Eduard Calhoun, PT Physical Therapist    ES Fide Rogel, PT Physical Therapist                    LDA Wound       06/16/17 1315          Wound 05/12/17 1400 Right lateral elbow abrasion    Wound - Properties Group Date first assessed: 05/12/17  - Time first assessed: 1400  - Side: Right  - Orientation: lateral  - Location: elbow  - Type: abrasion  -    Wound WDL ex  -ES      Dressing Appearance intact;moist drainage  -ES      Base epithelialization;clean;moist;granulating  -ES      Periwound Area intact;pink;dry  -ES      Edges open  -ES      Length (cm) 0.2  -ES      Width (cm) 0.1  -ES      Depth (cm) 0.1  -ES      Undermining [depth (cm)/Location] 1.5cm @ 2:00 probed with one prong of tweezers  -ES      Drainage Characteristics/Odor  serous;yellow;serosanguineous;no odor  -ES      Drainage Amount moderate  -ES      Picture taken yes  -ES      Wound Cleaning cleansed with;other (see comments)   phase one  -ES      Wound Interventions debrided  -ES      Dressing low-adherent;foam;silver impregnated dressing   Ag foam covered with optifoam border  -ES      Packing Incision packed with   sorbact  -ES      Periwound Care cleansed with pH balanced cleanser;dry periwound area maintained  -ES        User Key  (r) = Recorded By, (t) = Taken By, (c) = Cosigned By    Initials Name Provider Type    AMADA Rogel, PT Physical Therapist     Grazyna Cornelius RN Registered Nurse            WOUND DEBRIDEMENT  Debridement Site 1  Location- Site 1: RUE wounds  Instruments- Site 1:  (non selective debridement, flushed wound and manual expression of serous fluid)  Excised Tissue Description- Site 1: minimum, slough  Bleeding- Site 1: none             Recommendation and Plan        PT Assessment/Plan       06/16/17 1315       PT Assessment    Functional Limitations Limitations in functional capacity and performance;Performance in self-care ADL;Limitation in home management;Other (comment)  -ES     Impairments Integumentary integrity  -ES     Assessment Comments wound almost closed with continued depth though improved and with min/mod exudate. Will continue to pack with sorbact to help faciliate drainage and keep wound from complete closure and at risk of abscess. Likely approprate to transistion to 2x/wk  -ES     PT Plan    PT Frequency 2x/week  -ES     Physical Therapy Interventions (Optional Details) patient/family education;wound care  -ES     PT Plan Comments dressing changes and debridement prn while monitoring exudate and wound closure  -ES       User Key  (r) = Recorded By, (t) = Taken By, (c) = Cosigned By    Initials Name Provider Type    AMADA Rogel, PT Physical Therapist          Goals        PT OP Goals       06/16/17 1315       Time  Calculation    PT Goal Re-Cert Due Date 07/14/17  -ES       User Key  (r) = Recorded By, (t) = Taken By, (c) = Cosigned By    Initials Name Provider Type    ES Fide Rogel, PT Physical Therapist          PT Goal Re-Cert Due Date: 07/14/17            Time Calculation: Start Time: 1315    Therapy Charges for Today     Code Description Service Date Service Provider Modifiers Qty    10717476191 HC NONSELECTIVE DEBRIDEMENT 6/16/2017 Fide Rogel, PT GP 1    [id number]  PT THER SUPP EA 15 MIN 6/16/2017 Fide Rogel, PT GP 1                Fide Rogel, PT  6/16/2017

## 2017-06-19 ENCOUNTER — HOSPITAL ENCOUNTER (OUTPATIENT)
Dept: PHYSICAL THERAPY | Facility: HOSPITAL | Age: 77
Setting detail: THERAPIES SERIES
Discharge: HOME | End: 2017-06-19

## 2017-06-19 DIAGNOSIS — S51.001D ELBOW WOUND, RIGHT, SUBSEQUENT ENCOUNTER: Primary | ICD-10-CM

## 2017-06-19 PROCEDURE — 97602 WOUND(S) CARE NON-SELECTIVE: CPT

## 2017-06-19 NOTE — THERAPY WOUND CARE TREATMENT
Outpatient Rehabilitation - Wound/Debridement Treatment Note   Marylin     Patient Name: Yanet Grove  : 1940  MRN: [id number]  Today's Date: 2017                Admit Date: 2017    Visit Dx:    ICD-10-CM ICD-9-CM   1. 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   • Arthritis   • Osteoporosis   • Right arm cellulitis   • Foot pain, left        Past Medical History:   Diagnosis Date   • CKD (chronic kidney disease) stage 3, GFR 30-59 ml/min    • Esophageal stricture    • GERD (gastroesophageal reflux disease)    • GERD (gastroesophageal reflux disease)    • Glaucoma    • Heterotopic ossification of bone     Right Elbow   • History of gastric ulcer    • HLD (hyperlipidemia)    • HTN (hypertension)    • Insomnia    • Internal hemorrhoids    • Osteoarthritis, shoulder    • Osteoporosis    • Polymyalgia rheumatica    • Pseudomonas infection 2014    Right foot wound   • Rotator cuff tear, left         Past Surgical History:   Procedure Laterality Date   • CARPAL TUNNEL RELEASE Left    • CATARACT EXTRACTION Bilateral    • COLONOSCOPY  2011   • ELBOW ARTHROTOMY Right 2017    Procedure: Right elbow radical resection capsule soft tissue heterotopic bone with contracture release;  Surgeon: Trung Bonilla MD;  Location:  [facility];  Service:    • HYSTERECTOMY      TOTAL   • REPLACEMENT TOTAL KNEE     • SKIN GRAFT Right     Foot   • TOTAL ELBOW ARTHROPLASTY Right 2016    Procedure: TOTAL ELBOW ARTHROPLASTY; ULNAR NERVE TRANSPOSITION;  Surgeon: Trung Bonilla MD;  Location:  [facility];  Service:    • ULNAR NERVE TRANSPOSITION Right    • UPPER GASTROINTESTINAL ENDOSCOPY            EVALUATION        PT Ortho       06/19/17 1020    Subjective Comments    Subjective Comments No concerns, doesn't seem to have drained any since last visit  -ES    Subjective Pain    Able to rate subjective pain? yes  -ES    Pre-Treatment Pain Level 0  -ES    Post-Treatment Pain Level 0  -ES    Transfers    Transfers, Sit-Stand [place] independent  -ES    Transfers, Stand-Sit [place] independent  -ES    Transfer, Comment seated EOB with pillow to support UE  -ES    Gait Assessment/Treatment    Gait, [place] Level independent  -ES      06/16/17 1400    Subjective Comments    Subjective Comments MD pleased with progress though wants to make sure that wound remains open till depth is gone  -ES    Subjective Pain    Able to rate subjective pain? yes  -ES    Pre-Treatment Pain Level 0  -ES    Post-Treatment Pain Level 0  -ES    Transfers    Transfers, Sit-Stand [place] independent  -ES    Transfers, Stand-Sit [place] independent  -ES    Transfer, Comment seated EOB with limb supported on pillow  -ES    Gait Assessment/Treatment    Gait, [place] Level independent  -ES      User Key  (r) = Recorded By, (t) = Taken By, (c) = Cosigned By    Initials Name Provider Type    ES Fide Rogel, PT Physical Therapist                    LDA Wound       06/19/17 1020          Wound 05/12/17 1400 Right lateral elbow abrasion    Wound - Properties Group Date first assessed: 05/12/17  - Time first assessed: 1400  - Side: Right  - Orientation: lateral  - Location: elbow  - Type: abrasion  -JH    Wound WDL WDL  -ES      Dressing Appearance intact;no drainage  -ES      Base epithelialization;clean;moist;granulating  -ES      Periwound Area intact;pink;dry  -ES      Edges open  -ES      Length (cm) 0.2  -ES      Width (cm) 0.1  -ES      Depth (cm) 0.1  -ES      Undermining [depth (cm)/Location] 0.2 @ 2:00 with one prong of tweezers  -ES      Drainage Characteristics/Odor  serous;yellow;serosanguineous;no odor  -ES      Drainage Amount scant  -ES      Wound Cleaning cleansed with;other (see comments)   phase one  -ES      Wound Interventions debrided  -ES      Dressing Dressing applied;low-adherent;silver impregnated dressing;foam   Ag foam with hypafix tape to secure  -ES      Packing other (see comments)   small square of sorbact to undermining  -ES      Periwound Care cleansed with pH balanced cleanser;dry periwound area maintained  -ES        User Key  (r) = Recorded By, (t) = Taken By, (c) = Cosigned By    Initials Name Provider Type    AMADA Rogel, PT Physical Therapist     Grazyna Cornelius RN Registered Nurse            WOUND DEBRIDEMENT  Debridement Site 1  Instruments- Site 1:  (non-selective debridement of wound)             Recommendation and Plan        PT Assessment/Plan       06/19/17 1020       PT Assessment    Functional Limitations Limitations in functional capacity and performance;Performance in self-care ADL;Limitation in home management;Other (comment)  -ES     Impairments Integumentary integrity  -ES     Assessment Comments scant drainage with significant decrease in undermining. Still applied sorbact in hopes of maintaining opening to resolve undermining. Will likely D/C in 1-2 visits  -ES     PT Plan    Physical Therapy Interventions (Optional Details) patient/family education;wound care  -ES     PT Plan Comments Dressing change and debridement PRN, ?D/C  -ES       User Key  (r) = Recorded By, (t) = Taken By, (c) = Cosigned By    Initials Name Provider Type    AMADA Rogel, PT Physical Therapist          Goals        PT OP Goals       06/19/17 1020       Time Calculation    PT Goal Re-Cert Due Date 07/14/17  -ES       User Key  (r) = Recorded By, (t) = Taken By, (c) = Cosigned By    Initials Name Provider Type    AMADA Rogel, PT Physical Therapist          PT Goal Re-Cert Due Date: 07/14/17            Time Calculation: Start Time:  1020    Therapy Charges for Today     Code Description Service Date Service Provider Modifiers Qty    86171928313 HC PT THER SUPP EA 15 MIN 6/19/2017 Fide Rogel, PT GP 1    [id number]  NONSELECTIVE DEBRIDEMENT 6/19/2017 Fide Rogel, PT GP 1                Fide Rogel, PT  6/19/2017

## 2017-06-21 ENCOUNTER — HOSPITAL ENCOUNTER (OUTPATIENT)
Dept: PHYSICAL THERAPY | Facility: HOSPITAL | Age: 77
Setting detail: THERAPIES SERIES
Discharge: HOME | End: 2017-06-21

## 2017-06-21 DIAGNOSIS — S51.001D ELBOW WOUND, RIGHT, SUBSEQUENT ENCOUNTER: Primary | ICD-10-CM

## 2017-06-21 PROCEDURE — 97110 THERAPEUTIC EXERCISES: CPT

## 2017-06-21 PROCEDURE — G8991 OTHER PT/OT GOAL STATUS: HCPCS

## 2017-06-21 PROCEDURE — G8992 OTHER PT/OT  D/C STATUS: HCPCS

## 2017-06-21 NOTE — THERAPY DISCHARGE NOTE
Outpatient Rehabilitation - Wound/Debridement Treatment Note &  Discharge Summary   [facility]     Patient Name: Yanet Grove  : 1940  MRN: [id number]  Today's Date: 2017                Admit Date: 2017    Visit Dx:    ICD-10-CM ICD-9-CM   1. 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   • Arthritis   • Osteoporosis   • Right arm cellulitis   • Foot pain, left        Past Medical History:   Diagnosis Date   • CKD (chronic kidney disease) stage 3, GFR 30-59 ml/min    • Esophageal stricture    • GERD (gastroesophageal reflux disease)    • GERD (gastroesophageal reflux disease)    • Glaucoma    • Heterotopic ossification of bone 2016    Right Elbow   • History of gastric ulcer    • HLD (hyperlipidemia)    • HTN (hypertension)    • Insomnia    • Internal hemorrhoids    • Osteoarthritis, shoulder    • Osteoporosis    • Polymyalgia rheumatica    • Pseudomonas infection 2014    Right foot wound   • Rotator cuff tear, left         Past Surgical History:   Procedure Laterality Date   • CARPAL TUNNEL RELEASE Left    • CATARACT EXTRACTION Bilateral    • COLONOSCOPY     • ELBOW ARTHROTOMY Right 2017    Procedure: Right elbow radical resection capsule soft tissue heterotopic bone with contracture release;  Surgeon: Trung Bonilla MD;  Location:  [facility];  Service:    • HYSTERECTOMY      TOTAL   • REPLACEMENT TOTAL KNEE     • SKIN GRAFT Right     Foot   • TOTAL ELBOW ARTHROPLASTY Right 2016    Procedure: TOTAL ELBOW ARTHROPLASTY; ULNAR NERVE TRANSPOSITION;  Surgeon: Trung Bonilla MD;  Location:  [facility];  Service:    • ULNAR NERVE TRANSPOSITION Right    • UPPER GASTROINTESTINAL  ENDOSCOPY  2013         EVALUATION        PT Ortho       06/21/17 1445    Subjective Comments    Subjective Comments No concerns, reports she thinks she will be done with wound care today.  -MC    Subjective Pain    Able to rate subjective pain? yes  -MC    Pre-Treatment Pain Level 0  -MC    Post-Treatment Pain Level 0  -MC    Transfers    Transfers, Sit-Stand [place] independent  -MC    Transfers, Stand-Sit [place] independent  -MC    Transfer, Comment seated EOB, pillow to support RUE  -MC    Gait Assessment/Treatment    Gait, [place] Level independent  -MC      06/19/17 1020    Subjective Comments    Subjective Comments No concerns, doesn't seem to have drained any since last visit  -ES    Subjective Pain    Able to rate subjective pain? yes  -ES    Pre-Treatment Pain Level 0  -ES    Post-Treatment Pain Level 0  -ES    Transfers    Transfers, Sit-Stand [place] independent  -ES    Transfers, Stand-Sit [place] independent  -ES    Transfer, Comment seated EOB with pillow to support UE  -ES    Gait Assessment/Treatment    Gait, [place] Level independent  -ES      User Key  (r) = Recorded By, (t) = Taken By, (c) = Cosigned By    Initials Name Provider Type     Fide Rogel, PT Physical Therapist     Camila Slade, PT Physical Therapist                    [facility] Wound       06/21/17 1445          Wound 05/12/17 1400 Right lateral elbow abrasion    Wound - Properties Group Date first assessed: 05/12/17  - Time first assessed: 1400  - Side: Right  - Orientation: lateral  - Location: elbow  - Type: abrasion  -JH    Wound WDL WDL  -      Dressing Appearance intact;no drainage  -      Base epithelialization;clean;dry;reddened   small reddened area, appears dry/closed  -      Periwound Area intact;pink;dry  -      Drainage Amount none  -      Picture taken yes  -      Wound Cleaning cleansed with;other (see comments)   phase one  -      Dressing open to air  -       Periwound Care cleansed with pH balanced cleanser;dry periwound area maintained  -        User Key  (r) = Recorded By, (t) = Taken By, (c) = Cosigned By    Initials Name Provider Type    BERE Slade, PT Physical Therapist     Grazyna Cornelius, RN Registered Nurse            WOUND DEBRIDEMENT                Therapy Education  Given: Symptoms/condition management (Pt to keep area clean and dry. No need to cover. Pt to call [facility] doctor if she notices any drainage or change to the area.)  Program: Progressed  How Provided: Verbal  Provided to: Patient, Other (comment) (spouse)  Level of Understanding: Verbalized    Recommendation and Plan        PT Assessment/Plan       06/21/17 1445       PT Assessment    Functional Limitations Limitations in functional capacity and performance;Performance in self-care ADL;Limitation in home management;Other (comment)  -     Impairments Integumentary integrity  -     Assessment Comments R elbow wound is now closed with a small, red, dry area remaining over previously open area. Pt has met all her PT wound care goals and will be discharged from skilled PT care.  -     PT Plan    PT Plan Comments D/C 6/21/17.  -       User Key  (r) = Recorded By, (t) = Taken By, (c) = Cosigned By    Initials Name Provider Type    EBRE Slade, PT Physical Therapist          Goals        PT OP Goals       06/21/17 1445       PT Short Term Goals    STG 1 Patient and/ or caregiver able to verbalize signs and symptoms of infection.  -     STG 1 Progress Met  -     STG 2 Decrease wound dimensions by 25% as evidence of wound closure.  -     STG 2 Progress Met  -     STG 3 Decrease wound undermining by 1 cm.  -     STG 3 Progress Met  -     STG 4 Patient and/ or caregiver independent with home VAC use and care, including canister changes and dressing / seal management.  -     STG 4 Progress Met  -     Long Term Goals    LTG 1 Patient and/ or caregiver  independent with clean dressing changes once vac is d/c'd.  -     LTG 1 Progress Met  -     LTG 2 Decrease wound dimensions by 75% as evidence of wound closure.  -     LTG 2 Progress Met  -     LTG 3 Decrease wound undermining by 3 cm.  -     LTG 3 Progress Met  -       User Key  (r) = Recorded By, (t) = Taken By, (c) = Cosigned By    Initials Name Provider Type    BERE Slade, PT Physical Therapist          Time Calculation: Start Time: 1445  Total Timed Code Minutes- PT: 15 minute(s)    Therapy Charges for Today     Code Description Service Date Service Provider Modifiers Qty    98139273373 HC PT OTHER PRIME FUNCT PROJECTED 6/21/2017 Camila Slade, PT GP,  1    12435395558 HC PT OTHER PRIME FUNCT DISCHARGE 6/21/2017 Camila Slade, PT GP,  1    25097998846 HC PT THER PROC EA 15 MIN 6/21/2017 Camila Slade, PT GP 1          PT G-Codes  PT Professional Judgement Used?: Yes  Outcome Measure Options: BWAT (Anne-Jernigan Wound Assess Tool)  Score: No wound needs at this time. 0% limited.  Functional Limitation: Other PT primary  Other PT Primary Goal Status (): At least 20 percent but less than 40 percent impaired, limited or restricted  Other PT Primary Discharge Status (): 0 percent impaired, limited or restricted           OP Discharge Summary       06/21/17 1445          OP PT Discharge Summary    Date of Discharge 06/21/17  -      Reason for Discharge All goals achieved  -      Outcomes Achieved Able to achieve all goals within established timeline  -      Discharge Destination Home without follow-up  -      [facility] Inst[facility]. Pt to keep area clean and dry, but does not need to keep the area covered.  -        User Key  (r) = Recorded By, (t) = Taken By, (c) = Cosigned By    Initials Name Provider Type    BERE Slade, PT Physical Therapist          Camila Slade, PT  6/21/2017

## 2017-06-26 ENCOUNTER — APPOINTMENT (OUTPATIENT)
Dept: PHYSICAL THERAPY | Facility: HOSPITAL | Age: 77
End: 2017-06-26

## 2017-07-12 ENCOUNTER — OFFICE VISIT (OUTPATIENT)
Dept: CARDIOLOGY | Facility: CLINIC | Age: 77
End: 2017-07-12

## 2017-07-12 VITALS
HEART RATE: 54 BPM | DIASTOLIC BLOOD PRESSURE: 63 MMHG | SYSTOLIC BLOOD PRESSURE: 159 MMHG | BODY MASS INDEX: 22.71 KG/M2 | WEIGHT: 133 LBS | HEIGHT: 64 IN

## 2017-07-12 DIAGNOSIS — I25.10 CORONARY ARTERY DISEASE INVOLVING NATIVE CORONARY ARTERY OF NATIVE HEART WITHOUT ANGINA PECTORIS: Primary | ICD-10-CM

## 2017-07-12 DIAGNOSIS — E78.2 MIXED HYPERLIPIDEMIA: ICD-10-CM

## 2017-07-12 PROCEDURE — 99213 OFFICE O/P EST LOW 20 MIN: CPT | Performed by: INTERNAL MEDICINE

## 2017-07-12 RX ORDER — DOXYCYCLINE 100 MG/1
100 CAPSULE ORAL 2 TIMES DAILY
COMMUNITY
End: 2019-06-26

## 2017-07-12 NOTE — PROGRESS NOTES
[facility]  Office Progress Note  Yanet Grove  1940  [phone]      Visit Date:     PCP: Darin Baugh MD  [address] [place] [id number]    IDENTIFICATION: A 76 y.o. female  from [facility].    Chief Complaint   Patient presents with   • no complaints   • Follow-up   • chest pain syndrome   • dyslipidemia       PROBLEM LIST:    1.chest pain syndrome   A. LHC 2006, non-obstructive disease, LV 60% per David.   B. Echo 2006 wnl with mild aortic sclerosis, mild mr, mild tr  2. Knee replacement 2003  3. Insomnia  4. Dyslipidemia   A. 5/12: , TG 98, HDL 52,   5. Left rotator cuff tear  6. Glaucoma, followed per Dixie.  7. GERD with positive h.pylori per Dr. Ivan 2010  8. Polymyalgia rheumatica  9. Elbow fracture s/p ORIF 2016, sajani   Infection with continued abx as of 7/17- Filippo ID      Allergies  Allergies   Allergen Reactions   • Phenergan [Promethazine Hcl] Hives       Current Medications    Current Outpatient Prescriptions:   •  aspirin 81 MG EC tablet, Take 81 mg by mouth Daily., Disp: , Rfl:   •  cholecalciferol (VITAMIN D3) 1000 UNITS tablet, Take 2,000 Units by mouth daily., Disp: , Rfl:   •  diphenhydrAMINE (NIGHTTIME SLEEP AID) 25 MG tablet, Take 25 mg by mouth at night as needed for sleep., Disp: , Rfl:   •  doxycycline (MONODOX) 100 MG capsule, Take 100 mg by mouth 2 (Two) Times a Day., Disp: , Rfl:   •  ferrous sulfate 325 (65 FE) MG tablet, Take 325 mg by mouth daily with breakfast., Disp: , Rfl:   •  Raloxifene HCl (EVISTA PO), Take 60 mg by mouth daily., Disp: , Rfl:   No current facility-administered medications for this visit.     Facility-Administered Medications Ordered in Other Visits:   •  bupivacaine PF (MARCAINE) 0.25 % injection, , , PRN, Sean Brown CRNA, 50 mg at 09/13/16 0752    History of Present Illness   Patient presents in follow up.  Recurrent issue w non healing R elbow now followed per Filippo ID w  "continued oral abx indefinitely.  No cardiac issues.    ROS:  All systems have been reviewed and are negative with the exception of those mentioned in the HPI.    OBJECTIVE:  Vitals:    07/12/17 1119   BP: 159/63   BP Location: Left arm   Patient Position: Sitting   Pulse: 54   Weight: 133 lb (60.3 kg)   Height: 64\" (162.6 cm)     Physical Exam   Constitutional: She is oriented to person, place, and time. She appears well-developed and well-nourished. No distress.   Neck: Normal range of motion. Neck supple. No hepatojugular reflux and no JVD present. Carotid bruit is not present. No tracheal deviation present. No thyromegaly present.   Cardiovascular: Normal rate, regular rhythm, S1 normal, S2 normal, intact distal pulses and normal pulses.  PMI is not displaced.  Exam reveals no gallop, no distant heart sounds, no friction rub, no midsystolic click and no opening snap.    No murmur heard.  Pulses:       Radial pulses are 2+ on the right side, and 2+ on the left side.        Dorsalis pedis pulses are 2+ on the right side, and 2+ on the left side.        Posterior tibial pulses are 2+ on the right side, and 2+ on the left side.   Pulmonary/Chest: Effort normal and breath sounds normal. She has no wheezes. She has no rales.   Abdominal: Soft. Bowel sounds are normal. She exhibits no mass. There is no tenderness. There is no guarding.   Musculoskeletal: Normal range of motion. She exhibits no edema or tenderness.   Neurological: She is alert and oriented to person, place, and time.       Diagnostic Data:  Procedures      ASSESSMENT:   Diagnosis Plan   1. Coronary artery disease involving native coronary artery of native heart without angina pectoris     2. Mixed hyperlipidemia         PLAN:  1. Historically nonobstructive with no new anginal equivalent. Continue risk reduction and medical management.  2. On statin therapy. PCP to follow routine yearly lipid panel.     Follow up with us in 9 months or sooner as " needed.

## 2017-08-15 ENCOUNTER — TRANSCRIBE ORDERS (OUTPATIENT)
Dept: LAB | Facility: HOSPITAL | Age: 77
End: 2017-08-15

## 2017-08-15 DIAGNOSIS — L03.90 CELLULITIS DUE TO MRSA: ICD-10-CM

## 2017-08-15 DIAGNOSIS — L03.113 CELLULITIS OF RIGHT HAND EXCLUDING FINGERS AND THUMB: ICD-10-CM

## 2017-08-15 DIAGNOSIS — Z96.621 PRESENCE OF RIGHT ARTIFICIAL ELBOW JOINT: Primary | ICD-10-CM

## 2017-08-15 DIAGNOSIS — B95.62 CELLULITIS DUE TO MRSA: ICD-10-CM

## 2017-11-06 ENCOUNTER — LAB REQUISITION (OUTPATIENT)
Dept: LAB | Facility: HOSPITAL | Age: 77
End: 2017-11-06

## 2017-11-06 DIAGNOSIS — Z00.00 ROUTINE GENERAL MEDICAL EXAMINATION AT A HEALTH CARE FACILITY: ICD-10-CM

## 2017-11-06 LAB
ANION GAP SERPL CALCULATED.3IONS-SCNC: 5 MMOL/L (ref 3–11)
BASOPHILS # BLD AUTO: 0.11 10*3/MM3 (ref 0–0.2)
BASOPHILS NFR BLD AUTO: 1.9 % (ref 0–1)
BUN BLD-MCNC: 18 MG/DL (ref 9–23)
BUN/CREAT SERPL: 15 (ref 7–25)
CALCIUM SPEC-SCNC: 9.6 MG/DL (ref 8.7–10.4)
CHLORIDE SERPL-SCNC: 107 MMOL/L (ref 99–109)
CO2 SERPL-SCNC: 29 MMOL/L (ref 20–31)
CREAT BLD-MCNC: 1.2 MG/DL (ref 0.6–1.3)
CRP SERPL-MCNC: 0.08 MG/DL (ref 0–1)
DEPRECATED RDW RBC AUTO: 46.5 FL (ref 37–54)
EOSINOPHIL # BLD AUTO: 0.18 10*3/MM3 (ref 0–0.3)
EOSINOPHIL NFR BLD AUTO: 3.1 % (ref 0–3)
ERYTHROCYTE [DISTWIDTH] IN BLOOD BY AUTOMATED COUNT: 14 % (ref 11.3–14.5)
ERYTHROCYTE [SEDIMENTATION RATE] IN BLOOD: <1 MM/HR (ref 0–30)
GFR SERPL CREATININE-BSD FRML MDRD: 44 ML/MIN/1.73
GLUCOSE BLD-MCNC: 89 MG/DL (ref 70–100)
HCT VFR BLD AUTO: 36.7 % (ref 34.5–44)
HGB BLD-MCNC: 11.8 G/DL (ref 11.5–15.5)
IMM GRANULOCYTES # BLD: 0.01 10*3/MM3 (ref 0–0.03)
IMM GRANULOCYTES NFR BLD: 0.2 % (ref 0–0.6)
LYMPHOCYTES # BLD AUTO: 1.88 10*3/MM3 (ref 0.6–4.8)
LYMPHOCYTES NFR BLD AUTO: 32.4 % (ref 24–44)
MCH RBC QN AUTO: 29.3 PG (ref 27–31)
MCHC RBC AUTO-ENTMCNC: 32.2 G/DL (ref 32–36)
MCV RBC AUTO: 91.1 FL (ref 80–99)
MONOCYTES # BLD AUTO: 0.64 10*3/MM3 (ref 0–1)
MONOCYTES NFR BLD AUTO: 11 % (ref 0–12)
NEUTROPHILS # BLD AUTO: 2.98 10*3/MM3 (ref 1.5–8.3)
NEUTROPHILS NFR BLD AUTO: 51.4 % (ref 41–71)
PLATELET # BLD AUTO: 241 10*3/MM3 (ref 150–450)
PMV BLD AUTO: 9.7 FL (ref 6–12)
POTASSIUM BLD-SCNC: 4.8 MMOL/L (ref 3.5–5.5)
RBC # BLD AUTO: 4.03 10*6/MM3 (ref 3.89–5.14)
SODIUM BLD-SCNC: 141 MMOL/L (ref 132–146)
WBC NRBC COR # BLD: 5.8 10*3/MM3 (ref 3.5–10.8)

## 2017-11-06 PROCEDURE — 85025 COMPLETE CBC W/AUTO DIFF WBC: CPT

## 2017-11-06 PROCEDURE — 86140 C-REACTIVE PROTEIN: CPT

## 2017-11-06 PROCEDURE — 36415 COLL VENOUS BLD VENIPUNCTURE: CPT

## 2017-11-06 PROCEDURE — 85652 RBC SED RATE AUTOMATED: CPT

## 2017-11-06 PROCEDURE — 80048 BASIC METABOLIC PNL TOTAL CA: CPT

## 2017-12-06 ENCOUNTER — LAB REQUISITION (OUTPATIENT)
Dept: LAB | Facility: HOSPITAL | Age: 77
End: 2017-12-06

## 2017-12-06 DIAGNOSIS — Z00.00 ROUTINE GENERAL MEDICAL EXAMINATION AT A HEALTH CARE FACILITY: ICD-10-CM

## 2017-12-06 LAB
ANION GAP SERPL CALCULATED.3IONS-SCNC: 6 MMOL/L (ref 3–11)
BASOPHILS # BLD AUTO: 0.1 10*3/MM3 (ref 0–0.2)
BASOPHILS NFR BLD AUTO: 1.8 % (ref 0–1)
BUN BLD-MCNC: 22 MG/DL (ref 9–23)
BUN/CREAT SERPL: 18.3 (ref 7–25)
CALCIUM SPEC-SCNC: 9.1 MG/DL (ref 8.7–10.4)
CHLORIDE SERPL-SCNC: 106 MMOL/L (ref 99–109)
CO2 SERPL-SCNC: 26 MMOL/L (ref 20–31)
CREAT BLD-MCNC: 1.2 MG/DL (ref 0.6–1.3)
CRP SERPL-MCNC: 0.12 MG/DL (ref 0–1)
DEPRECATED RDW RBC AUTO: 45.6 FL (ref 37–54)
EOSINOPHIL # BLD AUTO: 0.23 10*3/MM3 (ref 0–0.3)
EOSINOPHIL NFR BLD AUTO: 4.1 % (ref 0–3)
ERYTHROCYTE [DISTWIDTH] IN BLOOD BY AUTOMATED COUNT: 13.6 % (ref 11.3–14.5)
ERYTHROCYTE [SEDIMENTATION RATE] IN BLOOD: <1 MM/HR (ref 0–30)
GFR SERPL CREATININE-BSD FRML MDRD: 44 ML/MIN/1.73
GLUCOSE BLD-MCNC: 82 MG/DL (ref 70–100)
HCT VFR BLD AUTO: 36.9 % (ref 34.5–44)
HGB BLD-MCNC: 11.9 G/DL (ref 11.5–15.5)
IMM GRANULOCYTES # BLD: 0.01 10*3/MM3 (ref 0–0.03)
IMM GRANULOCYTES NFR BLD: 0.2 % (ref 0–0.6)
LYMPHOCYTES # BLD AUTO: 1.72 10*3/MM3 (ref 0.6–4.8)
LYMPHOCYTES NFR BLD AUTO: 30.9 % (ref 24–44)
MCH RBC QN AUTO: 29.6 PG (ref 27–31)
MCHC RBC AUTO-ENTMCNC: 32.2 G/DL (ref 32–36)
MCV RBC AUTO: 91.8 FL (ref 80–99)
MONOCYTES # BLD AUTO: 0.5 10*3/MM3 (ref 0–1)
MONOCYTES NFR BLD AUTO: 9 % (ref 0–12)
NEUTROPHILS # BLD AUTO: 3.01 10*3/MM3 (ref 1.5–8.3)
NEUTROPHILS NFR BLD AUTO: 54 % (ref 41–71)
PLATELET # BLD AUTO: 247 10*3/MM3 (ref 150–450)
PMV BLD AUTO: 10.1 FL (ref 6–12)
POTASSIUM BLD-SCNC: 4.6 MMOL/L (ref 3.5–5.5)
RBC # BLD AUTO: 4.02 10*6/MM3 (ref 3.89–5.14)
SODIUM BLD-SCNC: 138 MMOL/L (ref 132–146)
WBC NRBC COR # BLD: 5.57 10*3/MM3 (ref 3.5–10.8)

## 2017-12-06 PROCEDURE — 86140 C-REACTIVE PROTEIN: CPT | Performed by: INTERNAL MEDICINE

## 2017-12-06 PROCEDURE — 85652 RBC SED RATE AUTOMATED: CPT | Performed by: INTERNAL MEDICINE

## 2017-12-06 PROCEDURE — 85025 COMPLETE CBC W/AUTO DIFF WBC: CPT | Performed by: INTERNAL MEDICINE

## 2017-12-06 PROCEDURE — 36415 COLL VENOUS BLD VENIPUNCTURE: CPT | Performed by: INTERNAL MEDICINE

## 2017-12-06 PROCEDURE — 80048 BASIC METABOLIC PNL TOTAL CA: CPT | Performed by: INTERNAL MEDICINE

## 2018-02-27 ENCOUNTER — LAB REQUISITION (OUTPATIENT)
Dept: LAB | Facility: HOSPITAL | Age: 78
End: 2018-02-27

## 2018-02-27 ENCOUNTER — TRANSCRIBE ORDERS (OUTPATIENT)
Dept: LAB | Facility: HOSPITAL | Age: 78
End: 2018-02-27

## 2018-02-27 DIAGNOSIS — Z96.659 INFECTION OF PROSTHETIC KNEE JOINT, INITIAL ENCOUNTER (HCC): Primary | ICD-10-CM

## 2018-02-27 DIAGNOSIS — Z00.00 ROUTINE GENERAL MEDICAL EXAMINATION AT A HEALTH CARE FACILITY: ICD-10-CM

## 2018-02-27 DIAGNOSIS — T84.59XA INFECTION OF PROSTHETIC KNEE JOINT, INITIAL ENCOUNTER (HCC): Primary | ICD-10-CM

## 2018-02-27 LAB
ANION GAP SERPL CALCULATED.3IONS-SCNC: 13 MMOL/L (ref 3–11)
BASOPHILS # BLD AUTO: 0.11 10*3/MM3 (ref 0–0.2)
BASOPHILS NFR BLD AUTO: 1.8 % (ref 0–1)
BUN BLD-MCNC: 21 MG/DL (ref 9–23)
BUN/CREAT SERPL: 16.2 (ref 7–25)
CALCIUM SPEC-SCNC: 9.4 MG/DL (ref 8.7–10.4)
CHLORIDE SERPL-SCNC: 104 MMOL/L (ref 99–109)
CO2 SERPL-SCNC: 19 MMOL/L (ref 20–31)
CREAT BLD-MCNC: 1.3 MG/DL (ref 0.6–1.3)
CRP SERPL-MCNC: 0.09 MG/DL (ref 0–1)
DEPRECATED RDW RBC AUTO: 49.6 FL (ref 37–54)
EOSINOPHIL # BLD AUTO: 0.24 10*3/MM3 (ref 0–0.3)
EOSINOPHIL NFR BLD AUTO: 3.9 % (ref 0–3)
ERYTHROCYTE [DISTWIDTH] IN BLOOD BY AUTOMATED COUNT: 14.5 % (ref 11.3–14.5)
GFR SERPL CREATININE-BSD FRML MDRD: 40 ML/MIN/1.73
GLUCOSE BLD-MCNC: 140 MG/DL (ref 70–100)
HCT VFR BLD AUTO: 38 % (ref 34.5–44)
HGB BLD-MCNC: 11.9 G/DL (ref 11.5–15.5)
IMM GRANULOCYTES # BLD: 0.01 10*3/MM3 (ref 0–0.03)
IMM GRANULOCYTES NFR BLD: 0.2 % (ref 0–0.6)
LYMPHOCYTES # BLD AUTO: 1.69 10*3/MM3 (ref 0.6–4.8)
LYMPHOCYTES NFR BLD AUTO: 27.4 % (ref 24–44)
MCH RBC QN AUTO: 29.4 PG (ref 27–31)
MCHC RBC AUTO-ENTMCNC: 31.3 G/DL (ref 32–36)
MCV RBC AUTO: 93.8 FL (ref 80–99)
MONOCYTES # BLD AUTO: 0.5 10*3/MM3 (ref 0–1)
MONOCYTES NFR BLD AUTO: 8.1 % (ref 0–12)
NEUTROPHILS # BLD AUTO: 3.62 10*3/MM3 (ref 1.5–8.3)
NEUTROPHILS NFR BLD AUTO: 58.6 % (ref 41–71)
PLATELET # BLD AUTO: 218 10*3/MM3 (ref 150–450)
PMV BLD AUTO: 11 FL (ref 6–12)
POTASSIUM BLD-SCNC: 4.5 MMOL/L (ref 3.5–5.5)
RBC # BLD AUTO: 4.05 10*6/MM3 (ref 3.89–5.14)
SODIUM BLD-SCNC: 136 MMOL/L (ref 132–146)
WBC NRBC COR # BLD: 6.17 10*3/MM3 (ref 3.5–10.8)

## 2018-02-27 PROCEDURE — 36415 COLL VENOUS BLD VENIPUNCTURE: CPT | Performed by: INTERNAL MEDICINE

## 2018-02-27 PROCEDURE — 85025 COMPLETE CBC W/AUTO DIFF WBC: CPT | Performed by: INTERNAL MEDICINE

## 2018-02-27 PROCEDURE — 86140 C-REACTIVE PROTEIN: CPT | Performed by: INTERNAL MEDICINE

## 2018-02-27 PROCEDURE — 80048 BASIC METABOLIC PNL TOTAL CA: CPT | Performed by: INTERNAL MEDICINE

## 2018-03-07 ENCOUNTER — HOSPITAL ENCOUNTER (OUTPATIENT)
Facility: HOSPITAL | Age: 78
Setting detail: OBSERVATION
Discharge: HOME | End: 2018-03-09
Attending: EMERGENCY MEDICINE | Admitting: INTERNAL MEDICINE

## 2018-03-07 ENCOUNTER — APPOINTMENT (OUTPATIENT)
Dept: CT IMAGING | Facility: HOSPITAL | Age: 78
End: 2018-03-07

## 2018-03-07 ENCOUNTER — APPOINTMENT (OUTPATIENT)
Dept: GENERAL RADIOLOGY | Facility: HOSPITAL | Age: 78
End: 2018-03-07

## 2018-03-07 DIAGNOSIS — R07.9 CHEST PAIN IN ADULT: Primary | ICD-10-CM

## 2018-03-07 LAB
ALBUMIN SERPL-MCNC: 4.3 G/DL (ref 3.2–4.8)
ALBUMIN/GLOB SERPL: 1.6 G/DL (ref 1.5–2.5)
ALP SERPL-CCNC: 57 U/L (ref 25–100)
ALT SERPL W P-5'-P-CCNC: 14 U/L (ref 7–40)
ANION GAP SERPL CALCULATED.3IONS-SCNC: 8 MMOL/L (ref 3–11)
AST SERPL-CCNC: 31 U/L (ref 0–33)
BASOPHILS # BLD AUTO: 0.09 10*3/MM3 (ref 0–0.2)
BASOPHILS NFR BLD AUTO: 1 % (ref 0–1)
BILIRUB SERPL-MCNC: 1 MG/DL (ref 0.3–1.2)
BNP SERPL-MCNC: 31 PG/ML (ref 0–100)
BUN BLD-MCNC: 18 MG/DL (ref 9–23)
BUN/CREAT SERPL: 16.4 (ref 7–25)
CALCIUM SPEC-SCNC: 9 MG/DL (ref 8.7–10.4)
CHLORIDE SERPL-SCNC: 107 MMOL/L (ref 99–109)
CO2 SERPL-SCNC: 23 MMOL/L (ref 20–31)
CREAT BLD-MCNC: 1.1 MG/DL (ref 0.6–1.3)
D-LACTATE SERPL-SCNC: 0.7 MMOL/L (ref 0.5–2)
DEPRECATED RDW RBC AUTO: 46.9 FL (ref 37–54)
EOSINOPHIL # BLD AUTO: 0.39 10*3/MM3 (ref 0–0.3)
EOSINOPHIL NFR BLD AUTO: 4.2 % (ref 0–3)
ERYTHROCYTE [DISTWIDTH] IN BLOOD BY AUTOMATED COUNT: 14.3 % (ref 11.3–14.5)
GFR SERPL CREATININE-BSD FRML MDRD: 48 ML/MIN/1.73
GLOBULIN UR ELPH-MCNC: 2.7 GM/DL
GLUCOSE BLD-MCNC: 102 MG/DL (ref 70–100)
HCT VFR BLD AUTO: 37.5 % (ref 34.5–44)
HGB BLD-MCNC: 12.2 G/DL (ref 11.5–15.5)
HOLD SPECIMEN: NORMAL
HOLD SPECIMEN: NORMAL
IMM GRANULOCYTES # BLD: 0.01 10*3/MM3 (ref 0–0.03)
IMM GRANULOCYTES NFR BLD: 0.1 % (ref 0–0.6)
LIPASE SERPL-CCNC: 45 U/L (ref 6–51)
LYMPHOCYTES # BLD AUTO: 2.29 10*3/MM3 (ref 0.6–4.8)
LYMPHOCYTES NFR BLD AUTO: 24.9 % (ref 24–44)
MCH RBC QN AUTO: 29.1 PG (ref 27–31)
MCHC RBC AUTO-ENTMCNC: 32.5 G/DL (ref 32–36)
MCV RBC AUTO: 89.5 FL (ref 80–99)
MONOCYTES # BLD AUTO: 0.81 10*3/MM3 (ref 0–1)
MONOCYTES NFR BLD AUTO: 8.8 % (ref 0–12)
NEUTROPHILS # BLD AUTO: 5.61 10*3/MM3 (ref 1.5–8.3)
NEUTROPHILS NFR BLD AUTO: 61 % (ref 41–71)
PLATELET # BLD AUTO: 267 10*3/MM3 (ref 150–450)
PMV BLD AUTO: 10.3 FL (ref 6–12)
POTASSIUM BLD-SCNC: 5 MMOL/L (ref 3.5–5.5)
PROT SERPL-MCNC: 7 G/DL (ref 5.7–8.2)
RBC # BLD AUTO: 4.19 10*6/MM3 (ref 3.89–5.14)
SODIUM BLD-SCNC: 138 MMOL/L (ref 132–146)
TROPONIN I SERPL-MCNC: 0 NG/ML (ref 0–0.07)
WBC NRBC COR # BLD: 9.2 10*3/MM3 (ref 3.5–10.8)
WHOLE BLOOD HOLD SPECIMEN: NORMAL
WHOLE BLOOD HOLD SPECIMEN: NORMAL

## 2018-03-07 PROCEDURE — 96375 TX/PRO/DX INJ NEW DRUG ADDON: CPT

## 2018-03-07 PROCEDURE — 83880 ASSAY OF NATRIURETIC PEPTIDE: CPT | Performed by: EMERGENCY MEDICINE

## 2018-03-07 PROCEDURE — 25010000002 ONDANSETRON PER 1 MG: Performed by: EMERGENCY MEDICINE

## 2018-03-07 PROCEDURE — 83605 ASSAY OF LACTIC ACID: CPT | Performed by: EMERGENCY MEDICINE

## 2018-03-07 PROCEDURE — 84484 ASSAY OF TROPONIN QUANT: CPT

## 2018-03-07 PROCEDURE — 74174 CTA ABD&PLVS W/CONTRAST: CPT

## 2018-03-07 PROCEDURE — 0 IOPAMIDOL PER 1 ML: Performed by: EMERGENCY MEDICINE

## 2018-03-07 PROCEDURE — 99285 EMERGENCY DEPT VISIT HI MDM: CPT

## 2018-03-07 PROCEDURE — 96374 THER/PROPH/DIAG INJ IV PUSH: CPT

## 2018-03-07 PROCEDURE — 25010000002 HYDROMORPHONE PER 4 MG: Performed by: EMERGENCY MEDICINE

## 2018-03-07 PROCEDURE — 85025 COMPLETE CBC W/AUTO DIFF WBC: CPT | Performed by: EMERGENCY MEDICINE

## 2018-03-07 PROCEDURE — 71045 X-RAY EXAM CHEST 1 VIEW: CPT

## 2018-03-07 PROCEDURE — 93005 ELECTROCARDIOGRAM TRACING: CPT | Performed by: EMERGENCY MEDICINE

## 2018-03-07 PROCEDURE — 80053 COMPREHEN METABOLIC PANEL: CPT | Performed by: EMERGENCY MEDICINE

## 2018-03-07 PROCEDURE — 83690 ASSAY OF LIPASE: CPT | Performed by: EMERGENCY MEDICINE

## 2018-03-07 PROCEDURE — 71275 CT ANGIOGRAPHY CHEST: CPT

## 2018-03-07 RX ORDER — SODIUM CHLORIDE 0.9 % (FLUSH) 0.9 %
10 SYRINGE (ML) INJECTION AS NEEDED
Status: DISCONTINUED | OUTPATIENT
Start: 2018-03-07 | End: 2018-03-09 | Stop reason: HOSPADM

## 2018-03-07 RX ORDER — HYDROMORPHONE HYDROCHLORIDE 1 MG/ML
0.5 INJECTION, SOLUTION INTRAMUSCULAR; INTRAVENOUS; SUBCUTANEOUS ONCE
Status: COMPLETED | OUTPATIENT
Start: 2018-03-07 | End: 2018-03-07

## 2018-03-07 RX ORDER — ASPIRIN 81 MG/1
324 TABLET, CHEWABLE ORAL ONCE
Status: COMPLETED | OUTPATIENT
Start: 2018-03-07 | End: 2018-03-07

## 2018-03-07 RX ORDER — ONDANSETRON 2 MG/ML
4 INJECTION INTRAMUSCULAR; INTRAVENOUS ONCE
Status: COMPLETED | OUTPATIENT
Start: 2018-03-07 | End: 2018-03-07

## 2018-03-07 RX ADMIN — HYDROMORPHONE HYDROCHLORIDE 0.5 MG: 10 INJECTION INTRAMUSCULAR; INTRAVENOUS; SUBCUTANEOUS at 22:10

## 2018-03-07 RX ADMIN — ONDANSETRON 4 MG: 2 INJECTION INTRAMUSCULAR; INTRAVENOUS at 22:11

## 2018-03-07 RX ADMIN — IOPAMIDOL 95 ML: 755 INJECTION, SOLUTION INTRAVENOUS at 23:10

## 2018-03-07 RX ADMIN — ASPIRIN 81 MG 324 MG: 81 TABLET ORAL at 22:09

## 2018-03-08 PROBLEM — D64.9 ANEMIA: Status: ACTIVE | Noted: 2018-03-08

## 2018-03-08 PROBLEM — R07.9 CHEST PAIN IN ADULT: Status: ACTIVE | Noted: 2018-03-08

## 2018-03-08 PROBLEM — S40.021A ARM BRUISE, RIGHT, INITIAL ENCOUNTER: Status: ACTIVE | Noted: 2018-03-08

## 2018-03-08 PROBLEM — R07.9 CHEST PAIN: Status: ACTIVE | Noted: 2018-03-08

## 2018-03-08 PROBLEM — I10 HYPERTENSION: Status: ACTIVE | Noted: 2018-03-08

## 2018-03-08 LAB
ANION GAP SERPL CALCULATED.3IONS-SCNC: 3 MMOL/L (ref 3–11)
APTT PPP: 25.5 SECONDS (ref 24–31)
ARTICHOKE IGE QN: 123 MG/DL (ref 0–130)
BUN BLD-MCNC: 16 MG/DL (ref 9–23)
BUN/CREAT SERPL: 16 (ref 7–25)
CALCIUM SPEC-SCNC: 8.3 MG/DL (ref 8.7–10.4)
CHLORIDE SERPL-SCNC: 110 MMOL/L (ref 99–109)
CHOLEST SERPL-MCNC: 183 MG/DL (ref 0–200)
CO2 SERPL-SCNC: 25 MMOL/L (ref 20–31)
CREAT BLD-MCNC: 1 MG/DL (ref 0.6–1.3)
DEPRECATED RDW RBC AUTO: 48.1 FL (ref 37–54)
ERYTHROCYTE [DISTWIDTH] IN BLOOD BY AUTOMATED COUNT: 14.3 % (ref 11.3–14.5)
FERRITIN SERPL-MCNC: 166 NG/ML (ref 10–291)
GFR SERPL CREATININE-BSD FRML MDRD: 54 ML/MIN/1.73
GLUCOSE BLD-MCNC: 101 MG/DL (ref 70–100)
HBA1C MFR BLD: 5.4 % (ref 4.8–5.6)
HCT VFR BLD AUTO: 32.7 % (ref 34.5–44)
HDLC SERPL-MCNC: 66 MG/DL (ref 40–60)
HGB BLD-MCNC: 10.5 G/DL (ref 11.5–15.5)
INR PPP: 1.09 (ref 0.91–1.09)
IRON 24H UR-MRATE: 42 MCG/DL (ref 50–175)
IRON SATN MFR SERPL: 15 % (ref 15–50)
MAGNESIUM SERPL-MCNC: 2 MG/DL (ref 1.3–2.7)
MCH RBC QN AUTO: 29.2 PG (ref 27–31)
MCHC RBC AUTO-ENTMCNC: 32.1 G/DL (ref 32–36)
MCV RBC AUTO: 90.8 FL (ref 80–99)
PLATELET # BLD AUTO: 229 10*3/MM3 (ref 150–450)
PMV BLD AUTO: 10.4 FL (ref 6–12)
POTASSIUM BLD-SCNC: 4.6 MMOL/L (ref 3.5–5.5)
PROTHROMBIN TIME: 11.4 SECONDS (ref 9.6–11.5)
RBC # BLD AUTO: 3.6 10*6/MM3 (ref 3.89–5.14)
RETICS/RBC NFR AUTO: 1.52 % (ref 0.5–1.5)
SODIUM BLD-SCNC: 138 MMOL/L (ref 132–146)
TIBC SERPL-MCNC: 275 MCG/DL (ref 250–450)
TRIGL SERPL-MCNC: 77 MG/DL (ref 0–150)
TROPONIN I SERPL-MCNC: <0.006 NG/ML
TROPONIN I SERPL-MCNC: <0.006 NG/ML
TSH SERPL DL<=0.05 MIU/L-ACNC: 7.69 MIU/ML (ref 0.35–5.35)
WBC NRBC COR # BLD: 8.13 10*3/MM3 (ref 3.5–10.8)

## 2018-03-08 PROCEDURE — 82728 ASSAY OF FERRITIN: CPT | Performed by: FAMILY MEDICINE

## 2018-03-08 PROCEDURE — 85610 PROTHROMBIN TIME: CPT | Performed by: FAMILY MEDICINE

## 2018-03-08 PROCEDURE — 99220 PR INITIAL OBSERVATION CARE/DAY 70 MINUTES: CPT | Performed by: FAMILY MEDICINE

## 2018-03-08 PROCEDURE — 85045 AUTOMATED RETICULOCYTE COUNT: CPT | Performed by: FAMILY MEDICINE

## 2018-03-08 PROCEDURE — 83550 IRON BINDING TEST: CPT | Performed by: FAMILY MEDICINE

## 2018-03-08 PROCEDURE — G0378 HOSPITAL OBSERVATION PER HR: HCPCS

## 2018-03-08 PROCEDURE — 85027 COMPLETE CBC AUTOMATED: CPT | Performed by: PHYSICIAN ASSISTANT

## 2018-03-08 PROCEDURE — 83735 ASSAY OF MAGNESIUM: CPT | Performed by: PHYSICIAN ASSISTANT

## 2018-03-08 PROCEDURE — 83540 ASSAY OF IRON: CPT | Performed by: FAMILY MEDICINE

## 2018-03-08 PROCEDURE — 80048 BASIC METABOLIC PNL TOTAL CA: CPT | Performed by: PHYSICIAN ASSISTANT

## 2018-03-08 PROCEDURE — 96376 TX/PRO/DX INJ SAME DRUG ADON: CPT

## 2018-03-08 PROCEDURE — 96372 THER/PROPH/DIAG INJ SC/IM: CPT

## 2018-03-08 PROCEDURE — 96375 TX/PRO/DX INJ NEW DRUG ADDON: CPT

## 2018-03-08 PROCEDURE — 83036 HEMOGLOBIN GLYCOSYLATED A1C: CPT | Performed by: PHYSICIAN ASSISTANT

## 2018-03-08 PROCEDURE — 25010000002 MORPHINE SULFATE (PF) 2 MG/ML SOLUTION: Performed by: INTERNAL MEDICINE

## 2018-03-08 PROCEDURE — 25010000002 HEPARIN (PORCINE) PER 1000 UNITS: Performed by: PHYSICIAN ASSISTANT

## 2018-03-08 PROCEDURE — 85730 THROMBOPLASTIN TIME PARTIAL: CPT | Performed by: FAMILY MEDICINE

## 2018-03-08 PROCEDURE — 25010000002 HYDROMORPHONE PER 4 MG: Performed by: FAMILY MEDICINE

## 2018-03-08 PROCEDURE — 84443 ASSAY THYROID STIM HORMONE: CPT | Performed by: PHYSICIAN ASSISTANT

## 2018-03-08 PROCEDURE — 84484 ASSAY OF TROPONIN QUANT: CPT | Performed by: PHYSICIAN ASSISTANT

## 2018-03-08 PROCEDURE — 80061 LIPID PANEL: CPT | Performed by: PHYSICIAN ASSISTANT

## 2018-03-08 RX ORDER — SODIUM CHLORIDE 0.9 % (FLUSH) 0.9 %
1-10 SYRINGE (ML) INJECTION AS NEEDED
Status: DISCONTINUED | OUTPATIENT
Start: 2018-03-08 | End: 2018-03-09 | Stop reason: HOSPADM

## 2018-03-08 RX ORDER — HYDROMORPHONE HYDROCHLORIDE 1 MG/ML
0.5 INJECTION, SOLUTION INTRAMUSCULAR; INTRAVENOUS; SUBCUTANEOUS ONCE
Status: COMPLETED | OUTPATIENT
Start: 2018-03-08 | End: 2018-03-08

## 2018-03-08 RX ORDER — MORPHINE SULFATE 2 MG/ML
1 INJECTION, SOLUTION INTRAMUSCULAR; INTRAVENOUS ONCE
Status: COMPLETED | OUTPATIENT
Start: 2018-03-08 | End: 2018-03-08

## 2018-03-08 RX ORDER — ACETAMINOPHEN 325 MG/1
650 TABLET ORAL EVERY 4 HOURS PRN
Status: DISCONTINUED | OUTPATIENT
Start: 2018-03-08 | End: 2018-03-09 | Stop reason: HOSPADM

## 2018-03-08 RX ORDER — ASPIRIN 81 MG/1
81 TABLET ORAL DAILY
Status: DISCONTINUED | OUTPATIENT
Start: 2018-03-08 | End: 2018-03-09 | Stop reason: HOSPADM

## 2018-03-08 RX ORDER — DOXYCYCLINE 100 MG/1
100 CAPSULE ORAL EVERY 12 HOURS SCHEDULED
Status: DISCONTINUED | OUTPATIENT
Start: 2018-03-08 | End: 2018-03-09 | Stop reason: HOSPADM

## 2018-03-08 RX ORDER — DOXYCYCLINE 100 MG/1
100 CAPSULE ORAL ONCE
Status: COMPLETED | OUTPATIENT
Start: 2018-03-08 | End: 2018-03-08

## 2018-03-08 RX ORDER — HEPARIN SODIUM 5000 [USP'U]/ML
5000 INJECTION, SOLUTION INTRAVENOUS; SUBCUTANEOUS EVERY 8 HOURS SCHEDULED
Status: DISCONTINUED | OUTPATIENT
Start: 2018-03-08 | End: 2018-03-09 | Stop reason: HOSPADM

## 2018-03-08 RX ADMIN — HEPARIN SODIUM 5000 UNITS: 5000 INJECTION, SOLUTION INTRAVENOUS; SUBCUTANEOUS at 20:22

## 2018-03-08 RX ADMIN — ASPIRIN 81 MG: 81 TABLET, COATED ORAL at 09:45

## 2018-03-08 RX ADMIN — DOXYCYCLINE 100 MG: 100 CAPSULE ORAL at 03:48

## 2018-03-08 RX ADMIN — HEPARIN SODIUM 5000 UNITS: 5000 INJECTION, SOLUTION INTRAVENOUS; SUBCUTANEOUS at 13:07

## 2018-03-08 RX ADMIN — DOXYCYCLINE 100 MG: 100 CAPSULE ORAL at 20:21

## 2018-03-08 RX ADMIN — MORPHINE SULFATE 1 MG: 2 INJECTION, SOLUTION INTRAMUSCULAR; INTRAVENOUS at 10:19

## 2018-03-08 RX ADMIN — HEPARIN SODIUM 5000 UNITS: 5000 INJECTION, SOLUTION INTRAVENOUS; SUBCUTANEOUS at 05:24

## 2018-03-08 RX ADMIN — HYDROMORPHONE HYDROCHLORIDE 0.5 MG: 10 INJECTION INTRAMUSCULAR; INTRAVENOUS; SUBCUTANEOUS at 05:24

## 2018-03-08 NOTE — CONSULTS
Adult Nutrition  Assessment/PES    Patient Name:  Yanet Grove  YOB: 1940  MRN: [id number]  Admit Date:  3/7/2018    Assessment Date:  3/8/2018          Reason for Assessment       03/08/18 1405    Reason for Assessment    Reason For Assessment/Visit nurse/nurse practitioner consult    Identified At Risk By Screening Criteria MST SCORE 2+;unintentional loss of 10 lbs or more in the past 2 mos    Time Spent (min) 20    Cardiac HTN;Dyslipidemia;CAD    Ortho Osteoporosis;Other (comment)   elbow sx (4/2017)    Renal CKD   stage 3    Other diagnosis chest pain              Nutrition/Diet History       03/08/18 1407    Nutrition/Diet History    Reported/Observed By Patient    Other Patient reports fluctuating appetite and PO intake since December 2017 due to  being in and out of hospital and rehabilitation. Dislikes hospital food which does not help matters. Unsure of exact timeframes of weight loss due to fluctations in PO intake. But aware of unintentional weight loss. Declines oral nutrition supplements, dislikes.            Anthropometrics       03/08/18 1409    Usual Body Weight (UBW)    Usual Body Weight 66.2 kg (146 lb)   4/2017 per EMR; most recent known weight 133 lbs (7/12/2017) which does not reflect any weight loss from July to now    Weight Loss 5.443 kg (12 lb)    % Weight Loss  8.2 %    Weight Loss Time Frame 1 year            Labs/Tests/Procedures/Meds       03/08/18 1412    Labs/Tests/Procedures/Meds    Labs/Tests Review Reviewed    Medication Review Reviewed, pertinent                Nutrition Prescription Ordered       03/08/18 1412    Nutrition Prescription PO    Current PO Diet Regular    Fluid Consistency Thin    Common Modifiers Cardiac            Evaluation of Received Nutrient/Fluid Intake       03/08/18 1412    PO Evaluation    Number of Days PO Intake Evaluated Insufficient Data            Problem/Interventions:        Problem 1       03/08/18 1412    Nutrition  Diagnoses Problem 1    Problem 1 Unintended Weight Loss    Etiology (related to) Factors Affecting Nutrition    Appetite Poor    Signs/Symptoms (evidenced by) Unintended Weight Change    Unintended Weight Change Loss    Number of Pounds Lost 12 lbs (8.2%)    Weight loss time period 1 year            Problem 2       03/08/18 1412    Nutrition Diagnoses Problem 2    Problem 2 Predicted Suboptimal Intake    Etiology (related to) Factors Affecting Nutrition    Appetite Fair    Food Habit/Preferences Dislike Supplement;Dislike PO in Hospital    Signs/Symptoms (evidenced by) Report/Observation    Other Comment patient reports she dislikes hospital food and oral nutrition supplements                  Intervention Goal       03/08/18 1413    Intervention Goal    General Nutrition support treatment    PO Establish PO            Nutrition Intervention       03/08/18 1413    Nutrition Intervention    RD/Tech Action Care plan reviewd;Follow Tx progress;Supplement offered/refused;Encourage intake;Interview for preference              Education/Evaluation       03/08/18 1413    Monitor/Evaluation    Monitor Per protocol;PO intake;Weight        Electronically signed by:  Brianna Gonzalez RD  03/08/18 2:14 PM

## 2018-03-08 NOTE — PLAN OF CARE
Problem: Patient Care Overview (Adult)  Goal: Plan of Care Review  Outcome: Ongoing (interventions implemented as appropriate)   03/08/18 1507   Outcome Evaluation   Outcome Summary/Follow up Plan Pt has c/o pain this AM, resolved with one time order of IV Morphine. Ultram PRN ordered. Pt has been resting this shift. No s/s of distress. VSS. Will continue to monitor.      Goal: Adult Individualization and Mutuality  Outcome: Ongoing (interventions implemented as appropriate)    Goal: Discharge Needs Assessment  Outcome: Ongoing (interventions implemented as appropriate)      Problem: Acute Coronary Syndrome (ACS) (Adult)  Goal: Signs and Symptoms of Listed Potential Problems Will be Absent or Manageable (Acute Coronary Syndrome)  Outcome: Ongoing (interventions implemented as appropriate)

## 2018-03-08 NOTE — PROGRESS NOTES
Discharge Planning Assessment  [facility]     Patient Name: Yanet Grove  MRN: [id number]  Today's Date: 3/8/2018    Admit Date: 3/7/2018          Discharge Needs Assessment       03/08/18 1137    Living Environment    Lives With spouse    Living Arrangements house    Home Accessibility no concerns    Stair Railings at Home none    Type of Financial/Environmental Concern none    Transportation Available car    Living Environment    Quality Of Family Relationships supportive    Able to Return to Prior Living Arrangements yes    Discharge Needs Assessment    Concerns To Be Addressed denies needs/concerns at this time;discharge planning concerns    Readmission Within The Last 30 Days no previous admission in last 30 days    Anticipated Changes Related to Illness none    Equipment Currently Used at Home none    Equipment Needed After Discharge none    Discharge Disposition still a patient            Discharge Plan       03/08/18 1138    Case Management/Social Work Plan    Plan Home    Patient/Family In Agreement With Plan yes    Additional Comments Met with Mrs. Grove at the bedside, she denies having any DME or HH services.  She states she has to get back home asap because her  is home and he needs her.  She states he just got out of the hospital recently and she's worried he will fall without her there.  She states her medications and copays are affordable.  Her plan is to return home at [place], her son will transport. CM will cont to follow. #[id number]        Discharge Placement     No information found        Expected Discharge Date and Time     Expected Discharge Date Expected Discharge Time    Mar 12, 2018               Demographic Summary       03/08/18 1135    Referral Information    Admission Type observation    Arrived From home or self-care    Referral Source admission list    Reason For Consult discharge planning    Primary Care Physician Information    Name Darin Baugh            Functional Status        03/08/18 1137    Functional Status Current    Current Functional Level Comment see nursing notes    Functional Status Prior    Ambulation 0-->independent    Transferring 0-->independent    Toileting 0-->independent    Bathing 0-->independent    Dressing 0-->independent    Eating 0-->independent    Communication 0-->understands/communicates without difficulty    Swallowing 0-->swallows foods/liquids without difficulty    IADL    Medications independent    Meal Preparation independent    Housekeeping independent    Laundry independent    Shopping independent    Oral Care independent    Activity Tolerance    Current Activity Limitations none    Usual Activity Tolerance good    Current Activity Tolerance good            Psychosocial     None            Abuse/Neglect     None            Legal     None            Substance Abuse     None            Patient Forms     None          Haleigh Spears RN

## 2018-03-08 NOTE — ED PROVIDER NOTES
Subjective   HPI Comments: 77-year-old female presents complaining of chest pain.  She has cardiac risk factors of hypertension and hyperlipidemia.  She states that approximately 4-5 hours ago, she had acute onset of central chest pain that radiated to her back.  Her symptoms have persisted since that time.  She describes the pain as sharp in nature.  The pain is currently 9 out of 10 in severity with no aggravating or alleviating factors.  No known triggers.  No vomiting.  No diaphoresis.  Mild shortness of breath.  She denies cough or fevers.  She denies similar symptoms before in the past.    Patient is a 77 y.o. female presenting with chest pain.   History provided by:  Patient  Chest Pain   Pain location:  Substernal area  Pain quality: sharp    Pain radiates to:  Mid back  Pain severity:  Moderate  Onset quality:  Gradual  Duration:  5 hours  Timing:  Constant  Progression:  Worsening  Chronicity:  New  Relieved by:  Nothing  Worsened by:  Nothing  Ineffective treatments: heat.  Associated symptoms: shortness of breath    Associated symptoms: no cough, no diaphoresis, no fever and no vomiting    Shortness of breath:     Severity:  Mild  Risk factors: high cholesterol and hypertension    Risk factors: no diabetes mellitus, not male and no smoking        Review of Systems   Constitutional: Negative for chills, diaphoresis and fever.   Respiratory: Positive for shortness of breath. Negative for cough.    Cardiovascular: Positive for chest pain.   Gastrointestinal: Negative for vomiting.   All other systems reviewed and are negative.      Past Medical History:   Diagnosis Date   • CKD (chronic kidney disease) stage 3, GFR 30-59 ml/min    • Esophageal stricture    • GERD (gastroesophageal reflux disease)    • GERD (gastroesophageal reflux disease)    • Glaucoma    • Heterotopic ossification of bone 2016    Right Elbow   • History of gastric ulcer    • HLD (hyperlipidemia)    • HTN (hypertension)    • Insomnia    •  Internal hemorrhoids    • MRSA (methicillin resistant Staphylococcus aureus)    • Osteoarthritis, shoulder    • Osteoporosis    • Polymyalgia rheumatica    • Pseudomonas infection 2014    Right foot wound   • Rotator cuff tear, left        Allergies   Allergen Reactions   • Phenergan [Promethazine Hcl] Hives       Past Surgical History:   Procedure Laterality Date   • CARPAL TUNNEL RELEASE Left    • CATARACT EXTRACTION Bilateral    • COLONOSCOPY  2011   • ELBOW ARTHROTOMY Right 4/17/2017    Procedure: Right elbow radical resection capsule soft tissue heterotopic bone with contracture release;  Surgeon: Trung Bonilla MD;  Location:  [facility];  Service:    • HYSTERECTOMY      TOTAL   • REPLACEMENT TOTAL KNEE  2003   • SKIN GRAFT Right 2014    Foot   • TOTAL ELBOW ARTHROPLASTY Right 9/12/2016    Procedure: TOTAL ELBOW ARTHROPLASTY; ULNAR NERVE TRANSPOSITION;  Surgeon: Trung Bonilla MD;  Location:  [facility];  Service:    • ULNAR NERVE TRANSPOSITION Right 2016   • UPPER GASTROINTESTINAL ENDOSCOPY  2013       Family History   Problem Relation Age of Onset   • Heart failure Mother    • Lung cancer Father        Social History     Social History   • Marital status:      Occupational History   • Retired [employer]     Social History Main Topics   • Smoking status: Never Smoker   • Smokeless tobacco: Never Used   • Alcohol use No   • Drug use: No   • Sexual activity: Yes     Partners: Male      Comment:  x 58 years         Objective   Physical Exam   Constitutional: She is oriented to person, place, and time. She appears well-developed and well-nourished. No distress.   Uncomfortable appearing female   HENT:   Head: Normocephalic and atraumatic.   Mouth/Throat: Oropharynx is clear and moist.   Eyes: EOM are normal. Pupils are equal, round, and reactive to light.   Neck: Normal range of motion. Neck supple. No JVD present.   Cardiovascular: Normal rate, regular rhythm, normal  heart sounds and intact distal pulses.  Exam reveals no gallop and no friction rub.    No murmur heard.  No pulse deficits noted   Pulmonary/Chest: Effort normal and breath sounds normal. No respiratory distress. She has no wheezes. She has no rales.   Abdominal: Soft. Bowel sounds are normal. She exhibits no distension and no mass. There is no tenderness. There is no rebound and no guarding.   No focal tenderness to palpation or peritoneal signs present, negative Friedman's sign   Musculoskeletal: Normal range of motion. She exhibits no edema.   Neurological: She is alert and oriented to person, place, and time.   Skin: Skin is warm and dry. No rash noted. She is not diaphoretic. No erythema.   Psychiatric: She has a normal mood and affect. Judgment and thought content normal.   Nursing note and vitals reviewed.      Procedures         ED Course  ED Course   Comment By Time   77-year-old female with cardiac risk factors of hypertension and hyperlipidemia presents complaining of chest pain radiating to her back for the past 4-5 hours.  On arrival to the ED, patient nontoxic appearing but uncomfortable.  Nonsurgical abdomen.  Negative Friedman's sign.  No pulse deficits noted.  Her initial EKG revealed normal sinus rhythm with heart rate of 95 and no ST segments suggestive of or concerning for ischemia.  Given abrupt onset of symptoms radiating to the back, we will obtain a CTA to rule out dissection and reevaluate after initial interventions. Tenzin Davalos MD 03/07 2219   Labs unrevealing. Tenzin Davalos MD 03/07 2256   CTA unrevealing.  Upon reevaluation, patient improved.  HEART score of 4.  Given patient's history and risk stratification, we will seek admission to the hospital for further evaluation and treatment.  The patient is hemodynamically stable and aware/agreeable with the plan. Tenzin Davalos MD 03/08 3698   Discussed the case with Dr. Castelan and will admit for further evaluation and  treatment.  The patient is hemolytically stable and aware/agreeable with the plan. Tenzin Davalos MD 03/08 0049         Recent Results (from the past 24 hour(s))   Comprehensive Metabolic Panel    Collection Time: 03/07/18 10:09 PM   Result Value Ref Range    Glucose 102 (H) 70 - 100 mg/dL    BUN 18 9 - 23 mg/dL    Creatinine 1.10 0.60 - 1.30 mg/dL    Sodium 138 132 - 146 mmol/L    Potassium 5.0 3.5 - 5.5 mmol/L    Chloride 107 99 - 109 mmol/L    CO2 23.0 20.0 - 31.0 mmol/L    Calcium 9.0 8.7 - 10.4 mg/dL    Total Protein 7.0 5.7 - 8.2 g/dL    Albumin 4.30 3.20 - 4.80 g/dL    ALT (SGPT) 14 7 - 40 U/L    AST (SGOT) 31 0 - 33 U/L    Alkaline Phosphatase 57 25 - 100 U/L    Total Bilirubin 1.0 0.3 - 1.2 mg/dL    eGFR Non African Amer 48 (L) >60 mL/min/1.73    Globulin 2.7 gm/dL    A/G Ratio 1.6 1.5 - 2.5 g/dL    BUN/Creatinine Ratio 16.4 7.0 - 25.0    Anion Gap 8.0 3.0 - 11.0 mmol/L   Lipase    Collection Time: 03/07/18 10:09 PM   Result Value Ref Range    Lipase 45 6 - 51 U/L   BNP    Collection Time: 03/07/18 10:09 PM   Result Value Ref Range    BNP 31.0 0.0 - 100.0 pg/mL   Light Blue Top    Collection Time: 03/07/18 10:09 PM   Result Value Ref Range    Extra Tube hold for add-on    Green Top (Gel)    Collection Time: 03/07/18 10:09 PM   Result Value Ref Range    Extra Tube Hold for add-ons.    Lavender Top    Collection Time: 03/07/18 10:09 PM   Result Value Ref Range    Extra Tube hold for add-on    Gold Top - SST    Collection Time: 03/07/18 10:09 PM   Result Value Ref Range    Extra Tube Hold for add-ons.    CBC Auto Differential    Collection Time: 03/07/18 10:09 PM   Result Value Ref Range    WBC 9.20 3.50 - 10.80 10*3/mm3    RBC 4.19 3.89 - 5.14 10*6/mm3    Hemoglobin 12.2 11.5 - 15.5 g/dL    Hematocrit 37.5 34.5 - 44.0 %    MCV 89.5 80.0 - 99.0 fL    MCH 29.1 27.0 - 31.0 pg    MCHC 32.5 32.0 - 36.0 g/dL    RDW 14.3 11.3 - 14.5 %    RDW-SD 46.9 37.0 - 54.0 fl    MPV 10.3 6.0 - 12.0 fL    Platelets 267 150  - 450 10*3/mm3    Neutrophil % 61.0 41.0 - 71.0 %    Lymphocyte % 24.9 24.0 - 44.0 %    Monocyte % 8.8 0.0 - 12.0 %    Eosinophil % 4.2 (H) 0.0 - 3.0 %    Basophil % 1.0 0.0 - 1.0 %    Immature Grans % 0.1 0.0 - 0.6 %    Neutrophils, Absolute 5.61 1.50 - 8.30 10*3/mm3    Lymphocytes, Absolute 2.29 0.60 - 4.80 10*3/mm3    Monocytes, Absolute 0.81 0.00 - 1.00 10*3/mm3    Eosinophils, Absolute 0.39 (H) 0.00 - 0.30 10*3/mm3    Basophils, Absolute 0.09 0.00 - 0.20 10*3/mm3    Immature Grans, Absolute 0.01 0.00 - 0.03 10*3/mm3   Lactic Acid, Plasma    Collection Time: 03/07/18 10:09 PM   Result Value Ref Range    Lactate 0.7 0.5 - 2.0 mmol/L   POC Troponin, Rapid    Collection Time: 03/07/18 10:19 PM   Result Value Ref Range    Troponin I 0.00 0.00 - 0.07 ng/mL     Note: In addition to lab results from this visit, the labs listed above may include labs taken at another facility or during a different encounter within the last 24 hours. Please correlate lab times with ED admission and discharge times for further clarification of the services performed during this visit.    CT Angiogram Abdomen Pelvis With & Without Contrast   Final Result     No evidence of abdominal aortic aneurysm or dissection.      THIS DOCUMENT HAS BEEN ELECTRONICALLY SIGNED BY SAUL GIRALDO MD      CT Angiogram Chest With & Without Contrast   Final Result   1.  No evidence of pulmonary embolism.   2.  No evidence of thoracic aortic aneurysm or dissection.      THIS DOCUMENT HAS BEEN ELECTRONICALLY SIGNED BY SAUL GIRALDO MD      XR Chest 1 View   Final Result     No acute findings.      THIS DOCUMENT HAS BEEN ELECTRONICALLY SIGNED BY SAUL GIRALDO MD        Vitals:    03/07/18 2222 03/07/18 2340 03/08/18 0000 03/08/18 0020   BP:  139/65 125/55 114/55   BP Location:       Patient Position:       Pulse: 96 95 91 88   Resp:       Temp:       TempSrc:       SpO2: 98% 99% 99% 97%   Weight:       Height:         Medications   sodium chloride 0.9 % flush  10 mL (not administered)   aspirin chewable tablet 324 mg (324 mg Oral Given 3/7/18 2209)   HYDROmorphone (DILAUDID) injection 0.5 mg (0.5 mg Intravenous Given 3/7/18 2210)   ondansetron (ZOFRAN) injection 4 mg (4 mg Intravenous Given 3/7/18 2211)   iopamidol (ISOVUE-370) 76 % injection 100 mL (95 mL Intravenous Given 3/7/18 2310)     ECG/EMG Results (last 24 hours)     Procedure Component Value Units Date/Time    ECG 12 Lead [[id number]] Collected:  03/07/18 2135     Updated:  03/07/18 2136                    MDM    Final diagnoses:   Chest pain in adult       Documentation assistance provided by yakelin Madrid.  Information recorded by the scribdave was done at my direction and has been verified and validated by me.     Kehinde Madrid  03/07/18 2148       Kehinde Madrid  03/07/18 2227       Tenzin Davalos MD  03/08/18 0050       Tenzin Davalos MD  03/08/18 0050

## 2018-03-08 NOTE — H&P
"    [facility] Medicine Services  HISTORY AND PHYSICAL    Patient Name: Yanet Grove  : 1940  MRN: [id number]  Primary Care Physician: Darin Baugh MD    Subjective   Subjective     Chief Complaint:  Chest pain    HPI:  Yanet Grove is a 77 y.o. female with a past medical history significant for GERD, CKD III, HLD, HTN, and PMR who presents with complaints of left rib pain, onset 4:00pm. Pain characterized as a constant, sharp and stabbing sensation which radiates to left upper back. Worse with movement and deep inspiration. 8/10 at its worst. No complaints of associated SOB, cough, congestion fever, syncope, abdominal pain, or N/V. No lower extremity pain or swelling. No history of DVT/PE. No recent falls, trauma, or heavy lifting. Patient's  recently had a cardiac work up and she was concerned about \"having a heart attack\". Records reviewed indicate patient is followed by Dr. Lee per cardiology. She last saw him 2017 for annual check up. Patient was stable at this time and instructed continue medical management of CAD; an ASA and statin. She underwent LHC in  per Dr. Hernandez which demonstrated non obstructive disease, LV 60%. Echocardiogram at this time noted mild aortic sclerosis with mild mitral and tricuspid regurgitation. She has never been a smoker. Will admit for further evaluation and treatment.     Emergency Department Evaluation; vitals stable. Troponin negative. Chemistry and hematology favorable. EKG without changes. CTA chest and abdomen negative for acute PE, dissection or other cardiopulmonary process.     Review of Systems   Constitutional: Negative for chills and fever.   HENT: Negative for congestion and trouble swallowing.    Eyes: Negative for photophobia and visual disturbance.   Respiratory: Negative for cough and shortness of breath.    Cardiovascular: Positive for chest pain. Negative for leg swelling.   Gastrointestinal: Negative for " abdominal pain, diarrhea, nausea and vomiting.   Endocrine: Negative for cold intolerance and heat intolerance.   Genitourinary: Negative for dysuria and flank pain.   Musculoskeletal: Negative for gait problem and joint swelling.   Skin: Negative for pallor and rash.   Allergic/Immunologic: Negative for immunocompromised state.   Neurological: Negative for dizziness, syncope and light-headedness.   Hematological: Negative for adenopathy.   Psychiatric/Behavioral: Negative for agitation and confusion.          Otherwise 10-system ROS reviewed and is negative except as mentioned in the HPI.    Personal History     Past Medical History:   Diagnosis Date   • CKD (chronic kidney disease) stage 3, GFR 30-59 ml/min    • Esophageal stricture    • GERD (gastroesophageal reflux disease)    • GERD (gastroesophageal reflux disease)    • Glaucoma    • Heterotopic ossification of bone 2016    Right Elbow   • History of gastric ulcer    • HLD (hyperlipidemia)    • HTN (hypertension)    • Insomnia    • Internal hemorrhoids    • MRSA (methicillin resistant Staphylococcus aureus)    • Osteoarthritis, shoulder    • Osteoporosis    • Polymyalgia rheumatica    • Pseudomonas infection 2014    Right foot wound   • Rotator cuff tear, left        Past Surgical History:   Procedure Laterality Date   • CARPAL TUNNEL RELEASE Left    • CATARACT EXTRACTION Bilateral    • COLONOSCOPY  2011   • ELBOW ARTHROTOMY Right 4/17/2017    Procedure: Right elbow radical resection capsule soft tissue heterotopic bone with contracture release;  Surgeon: Trung Bonilla MD;  Location:  [facility];  Service:    • HYSTERECTOMY      TOTAL   • REPLACEMENT TOTAL KNEE  2003   • SKIN GRAFT Right 2014    Foot   • TOTAL ELBOW ARTHROPLASTY Right 9/12/2016    Procedure: TOTAL ELBOW ARTHROPLASTY; ULNAR NERVE TRANSPOSITION;  Surgeon: Trung Bonilla MD;  Location:  [facility];  Service:    • ULNAR NERVE TRANSPOSITION Right 2016   • UPPER GASTROINTESTINAL ENDOSCOPY   2013       Family History: family history includes Heart failure in her mother; Lung cancer in her father.     Social History:  reports that she has never smoked. She has never used smokeless tobacco. She reports that she does not drink alcohol or use illicit drugs.  Social History     Social History Narrative       Medications:  Prescriptions Prior to Admission   Medication Sig Dispense Refill Last Dose   • aspirin 81 MG EC tablet Take 81 mg by mouth Daily.   Taking   • cholecalciferol (VITAMIN D3) 1000 UNITS tablet Take 2,000 Units by mouth daily.   Taking   • diphenhydrAMINE (NIGHTTIME SLEEP AID) 25 MG tablet Take 25 mg by mouth at night as needed for sleep.   Taking   • doxycycline (MONODOX) 100 MG capsule Take 100 mg by mouth 2 (Two) Times a Day.   Taking   • ferrous sulfate 325 (65 FE) MG tablet Take 325 mg by mouth daily with breakfast.   Taking   • Raloxifene HCl (EVISTA PO) Take 60 mg by mouth daily.   Taking       Allergies   Allergen Reactions   • Phenergan [Promethazine Hcl] Hives       Objective   Objective     Vital Signs:   Temp:  [97.6 °F (36.4 °C)-98.2 °F (36.8 °C)] 98.2 °F (36.8 °C)  Heart Rate:  [] 83  Resp:  [20] 20  BP: (114-151)/(55-80) 151/67        Physical Exam   Constitutional: No acute distress, awake, alert, very pleasant and verbose  Eyes: PERRLA, sclerae anicteric, no conjunctival injection  HENT: NCAT, mucous membranes moist  Neck: Supple, no thyromegaly, no lymphadenopathy, trachea midline  Respiratory: Clear to auscultation bilaterally, nonlabored respirations   Cardiovascular: RRR, no murmurs, rubs, or gallops, palpable pedal pulses bilaterally  Pain reproducible with palpaiton  Gastrointestinal: Positive bowel sounds, soft, nontender, nondistended  Musculoskeletal: No bilateral ankle edema, no clubbing or cyanosis to extremities  Psychiatric: Appropriate affect, cooperative  Neurologic: Oriented x 3, strength symmetric in all extremities, Cranial Nerves grossly intact to  confrontation, speech clear  Skin: No rashes    Results Reviewed:  I have personally reviewed current lab, radiology, and data and agree.      Results from last 7 days  Lab Units 03/07/18  2209   WBC 10*3/mm3 9.20   HEMOGLOBIN g/dL 12.2   HEMATOCRIT % 37.5   PLATELETS 10*3/mm3 267       Results from last 7 days  Lab Units 03/07/18  2209   SODIUM mmol/L 138   POTASSIUM mmol/L 5.0   CHLORIDE mmol/L 107   CO2 mmol/L 23.0   BUN mg/dL 18   CREATININE mg/dL 1.10   GLUCOSE mg/dL 102*   CALCIUM mg/dL 9.0   ALT (SGPT) U/L 14   AST (SGOT) U/L 31     Estimated Creatinine Clearance: 42 mL/min (by C-G formula based on Cr of 1.1).  Brief Urine Lab Results  (Last result in the past 365 days)      Color   Clarity   Blood   Leuk Est   Nitrite   Protein   CREAT   Urine HCG        05/12/17 1734 Dark Yellow(A) Clear Negative Moderate (2+)(A) Negative Negative             BNP   Date Value Ref Range Status   03/07/2018 31.0 0.0 - 100.0 pg/mL Final     Comment:     Results may be falsely decreased if patient taking Biotin.     No results found for: PHART  Imaging Results (last 24 hours)     Procedure Component Value Units Date/Time    XR Chest 1 View [[id number]] Collected:  03/07/18 2135     Updated:  03/07/18 2237    Narrative:       EXAM:    XR Chest, 1 View    CLINICAL HISTORY:    77 years old, female; Pain; Chest pain; Left-sided chest pain; Additional   info: Chest pain triage protocol    TECHNIQUE:    Frontal view of the chest.    COMPARISON:    CR - XR CHEST PA AND LATERAL 2017-04-03 11:16    FINDINGS:    Lungs:  Unremarkable.  No consolidation.    Pleural space:  Unremarkable.  No pneumothorax.    Heart:  Unremarkable.  No cardiomegaly.    Mediastinum:  There is a large hiatal hernia.    Bones/joints:  Unremarkable.      Impression:         No acute findings.    THIS DOCUMENT HAS BEEN ELECTRONICALLY SIGNED BY SAUL GIRALDO MD    CT Angiogram Chest With & Without Contrast [[id number]] Collected:  03/07/18 2204     Updated:   03/08/18 0018    Narrative:       EXAM:    CT Angiography Chest Without and With Intravenous Contrast    CLINICAL HISTORY:    77 years old, female; Pain; Chest pain; Additional info: Cp radiating to   back, dissection protocol    TECHNIQUE:    Axial computed tomographic angiography images of the chest without and with   intravenous contrast using pulmonary embolism protocol.  All CT scans at this   facility use one or more dose reduction techniques, viz.: automated exposure   control; ma/kV adjustment per patient size (including targeted exams where dose   is matched to indication; i.e. head); or iterative reconstruction technique.    MIP reconstructed images were created and reviewed.    Coronal and sagittal reformatted images were created and reviewed.    CONTRAST:    95 mL of Isovue 370 administered intravenously.    COMPARISON:    CR - XR CHEST 1 VW 2018-03-07 22:17    FINDINGS:    Pulmonary arteries:  No evidence of pulmonary embolism.    Aorta:  No evidence of thoracic aortic aneurysm or dissection.    Lungs:  Bibasilar nonspecific lung base density is present, consistent with   dependent atelectasis.  No mass.    Pleural space:  Unremarkable.  No significant effusion.  No pneumothorax.    Heart:  Unremarkable.  No cardiomegaly.  No significant pericardial effusion.    No evidence of RV dysfunction.    Mediastinum:  A large hiatal hernia is present.    Bones/joints:  There are degenerative changes of the spine.  No acute   fracture.  No dislocation.    Soft tissues:  Unremarkable.    Lymph nodes:  Unremarkable.  No enlarged lymph nodes.    Other findings:  Upper abdominal organs are unremarkable.      Impression:       1.  No evidence of pulmonary embolism.  2.  No evidence of thoracic aortic aneurysm or dissection.    THIS DOCUMENT HAS BEEN ELECTRONICALLY SIGNED BY SAUL GIRALDO MD    CT Angiogram Abdomen Pelvis With & Without Contrast [[id number]] Collected:  03/07/18 2204     Updated:  03/08/18 0023     Narrative:       EXAM:    CT Angiography Abdomen and Pelvis Without and With Intravenous Contrast    CLINICAL HISTORY:    77 years old, female; Pain; Other: Chest pain; Additional info: Cp radiating   to back, dissection protocol    TECHNIQUE:    Axial computed tomographic angiography images of the abdomen and pelvis   without and with intravenous contrast.  All CT scans at this facility use one   or more dose reduction techniques, viz.: automated exposure control; ma/kV   adjustment per patient size (including targeted exams where dose is matched to   indication; i.e. head); or iterative reconstruction technique.    MIP reconstructed images were created and reviewed.    Coronal and sagittal reformatted images were created and reviewed.    CONTRAST:    95 mL of Isovue 370 administered intravenously.    COMPARISON:    No relevant prior studies available.    FINDINGS:    Lower thorax:  A large hiatal hernia is present.     VASCULATURE:    Aorta:  No acute findings.  No abdominal aortic aneurysm.  No dissection.    Celiac trunk and mesenteric arteries:  No acute findings.  No occlusion or   significant stenosis.    Renal arteries:  No acute findings.  No occlusion or significant stenosis.    Iliac arteries:  No acute findings.  No occlusion or significant stenosis.     ABDOMEN:    Liver:  Unremarkable.  No mass.    Gallbladder and bile ducts:  Unremarkable.  No calcified stones.  No ductal   dilation.    Pancreas:  Unremarkable.  No ductal dilation.  No mass.    Spleen:  Unremarkable.  No splenomegaly.    Adrenals:  Unremarkable.  No mass.    Kidneys and ureters:  Unremarkable.  No obstructing stones.  No   hydronephrosis.  No solid mass.    Stomach and bowel:  There is moderate colonic stool retention.  No   obstruction.  No mucosal thickening.    Appendix:  No findings to suggest acute appendicitis.     PELVIS:    Bladder:  Unremarkable.  No stones.  No mass.    Reproductive:  Unremarkable as visualized.     ABDOMEN  and PELVIS:    Intraperitoneal space:  Unremarkable.  No significant fluid collection.  No   free air.    Bones/joints:  There are degenerative changes of the spine.  No acute   fracture.  No dislocation.    Soft tissues: Fat-containing periumbilical hernia.    Lymph nodes:  Unremarkable.  No enlarged lymph nodes.      Impression:         No evidence of abdominal aortic aneurysm or dissection.    THIS DOCUMENT HAS BEEN ELECTRONICALLY SIGNED BY SAUL GIRALDO MD             Assessment/Plan   Assessment / Plan     Hospital Problem List     * (Principal)Chest pain    GERD (gastroesophageal reflux disease)    CKD (chronic kidney disease) stage 3, GFR 30-59 ml/min    Overview Signed 9/12/2016 10:07 PM by ORION Alcala     Baseline CR 1.0-1.1         Hypertension    Dyslipidemia    Polymyalgia rheumatica    Heterotopic ossification or right elbow s/p radiation            Assessment & Plan:  1. Chest pain:  - Erich score: 2-3  - cardiac vs musculoskeletal. Suspect musculoskeletal etiology. Troponin, EKG negative. Pain reproducible with testing of ROM of left upper extremity, deep inspiration and palpation.  - continue to trend cardiac enzymes. Repeat EKG in am  - consider stress test or cardiology consult in am. Patient could otherwise benefit from PT/OT and non nsaid pain medication (muscle relaxer, has CKD) for rib pain.  - should troponin and/or EKG change, will initiate ACS protocol and proceed with cards consult as above  - she did receive ASA and dilaudid in ED with some mild improvement of pain  - am labs, lipid panel, TSH, magnesium    2. Hypertension:  - stable and controlled. Continue home meds.    3. History of septic arthritis of right elbow:  - broke right elbow a year ago. Underwent procedure per Dr. Bonilla 4/2017. Had post op infection and found to have MRSA in Elbow 5/2017. Patient treated and continues to be followed per Dr. Kidd with ID. She takes doxycycline daily for suppressive therapy.    4. CKD  III:  - baseline creatinine 1.1 to 1.3. Stable.  - avoid additional nephrotoxic agents    DVT prophylaxis: [facility]    CODE STATUS:  Full Code    Admission Status:  I believe this patient meets OBSERVATION status, however if further evaluation or treatment plans warrant, status may change.  Based upon current information, I predict patient's care encounter to be less than or equal to 2 midnights.      Electronically signed by Ashu Rivers PA-C, 03/08/18, 3:50 AM.        Brief Attending Admission Attestation     I have seen and examined the patient, performing an independent face-to-face diagnostic evaluation with plan of care reviewed and developed with the advanced practice clinician (APC).      Brief Summary Statement/HPI:   Yanet Grove is a 77 y.o. female with history of kidney disease, hypertension, and polymyalgia rheumatica.  Resented to the emergency department at [facility] with acute onset of left-sided chest pain.  Discussed the pain is severe 10 out of 10 in the left breast.  She states that she had fallen less than a week ago.  She flipped over her dogs.  And landed on the tile floor.  She was not aware of the pain being that severe at that time.  States that the pain is not associated with shortness of breath but it does radiate into her back it is not associated with nausea or vomiting.  Initial troponin in emergency department was 0.00.  CTA of the chest in the emergency department was negative.  Other than noting a large hiatal hernia.    Attending Physical Exam:  Vitals reviewed.   Gen-no acute distress  HEENT-atraumatic, normocephalic, PERRLA, EOMI, moist mucous membranes present  CV-RRR, S1 S2 normal, No Murmur, No Gallop, , chest pain under left breast and left side of sternum is reproducible.  Resp-CTAB, no wheezes or rales; normal respiratory effortAbd-soft, NT, ND, +BS; no rebound or guarding  Ext-no edema or clubbing, pedal pulses intact  Skin-warm, dry, no rashes or lesions noted,  extensive bruising of the right upper extremity  Neuro-A&Ox3, equal strength in upper and lower extremities; no focal deficits, CN II-XII grossly intact  Psych-talkative        Brief Assessment/Plan :    Principal Problem:    Chest pain  Active Problems:    Dyslipidemia    GERD (gastroesophageal reflux disease)    Polymyalgia rheumatica    CKD (chronic kidney disease) stage 3, GFR 30-59 ml/min    Heterotopic ossification or right elbow s/p radiation    Hypertension    Anemia    Arm bruise, right, initial encounter    Admit patient to telemetry, follow serial cardiac markers.  Will also do anemia workup.  And obtain coags.  Due to extensive bruising.  Patient has been on doxycycline.  We will hold that for now.  And clarify with outpatient pharmacy.    See above for further detailed assessment and plan developed with APC which I have reviewed and/or edited.      Electronically signed by Fatmata Castelan DO, 03/08/18, 4:50 AM.

## 2018-03-09 VITALS
OXYGEN SATURATION: 96 % | HEIGHT: 64 IN | RESPIRATION RATE: 18 BRPM | BODY MASS INDEX: 22.98 KG/M2 | HEART RATE: 110 BPM | WEIGHT: 134.6 LBS | DIASTOLIC BLOOD PRESSURE: 92 MMHG | TEMPERATURE: 98.6 F | SYSTOLIC BLOOD PRESSURE: 131 MMHG

## 2018-03-09 PROBLEM — E03.9 ACQUIRED HYPOTHYROIDISM: Status: ACTIVE | Noted: 2018-03-09

## 2018-03-09 PROBLEM — R07.9 CHEST PAIN: Status: RESOLVED | Noted: 2018-03-08 | Resolved: 2018-03-09

## 2018-03-09 PROCEDURE — 25010000002 HEPARIN (PORCINE) PER 1000 UNITS: Performed by: PHYSICIAN ASSISTANT

## 2018-03-09 PROCEDURE — G0378 HOSPITAL OBSERVATION PER HR: HCPCS

## 2018-03-09 PROCEDURE — 99217 PR OBSERVATION CARE DISCHARGE MANAGEMENT: CPT | Performed by: NURSE PRACTITIONER

## 2018-03-09 PROCEDURE — 96372 THER/PROPH/DIAG INJ SC/IM: CPT

## 2018-03-09 PROCEDURE — 93005 ELECTROCARDIOGRAM TRACING: CPT | Performed by: PHYSICIAN ASSISTANT

## 2018-03-09 PROCEDURE — 93010 ELECTROCARDIOGRAM REPORT: CPT | Performed by: INTERNAL MEDICINE

## 2018-03-09 RX ORDER — LEVOTHYROXINE SODIUM 0.03 MG/1
25 TABLET ORAL DAILY
Qty: 30 TABLET | Refills: 0 | Status: SHIPPED | OUTPATIENT
Start: 2018-03-09 | End: 2018-06-13

## 2018-03-09 RX ORDER — TRAMADOL HYDROCHLORIDE 50 MG/1
50 TABLET ORAL EVERY 6 HOURS PRN
Status: DISCONTINUED | OUTPATIENT
Start: 2018-03-09 | End: 2018-03-09 | Stop reason: HOSPADM

## 2018-03-09 RX ADMIN — DOXYCYCLINE 100 MG: 100 CAPSULE ORAL at 09:11

## 2018-03-09 RX ADMIN — HEPARIN SODIUM 5000 UNITS: 5000 INJECTION, SOLUTION INTRAVENOUS; SUBCUTANEOUS at 06:20

## 2018-03-09 RX ADMIN — ASPIRIN 81 MG: 81 TABLET, COATED ORAL at 09:11

## 2018-03-09 NOTE — DISCHARGE SUMMARY
"    [facility] Medicine Services  DISCHARGE SUMMARY    Patient Name: Yanet Grove  : 1940  MRN: [id number]    Date of Admission: 3/7/2018  Date of Discharge:  3/9/2018  Primary Care Physician: Darin Baugh MD    Consults     No orders found from 2018 to 3/8/2018.        Hospital Course     Presenting Problem:   Chest pain in adult [R07.9]    Active Hospital Problems (** Indicates Principal Problem)    Diagnosis Date Noted   • Acquired hypothyroidism [E03.9] 2018   • Hypertension [I10] 2018   • Anemia [D64.9] 2018   • Arm bruise, right, initial encounter [S40.021A] 2018   • Heterotopic ossification or right elbow s/p radiation [M89.8X9] 2017   • CKD (chronic kidney disease) stage 3, GFR 30-59 ml/min [N18.3] 2016   • Dyslipidemia [E78.5] 2016   • GERD (gastroesophageal reflux disease) [K21.9] 2016   • Polymyalgia rheumatica [M35.3] 2016      Resolved Hospital Problems    Diagnosis Date Noted Date Resolved   • **Chest pain [R07.9] 2018      Hospital Course:  Yanet Grove is a 77 y.o. female is a 77-year-old female with PMH significant for GERD, CK D3, HLD, HTN and PMR who presented to [facility] ED on 3/7/2018 with complaints of left rib pain that started approximately 4:00 PM on date of admission.  Pain was characterized as constant, sharp stabbing sensation which radiated to the left upper back and was worse with movement and deep inspiration.  Patient rated pain 8/10 at its worst, but denied SOA, cough, congestion, fever, syncope, abdominal pain, N/V, LE pain, LE swelling, recent trauma or heavy lifting.  Patient has been has been ill and was hospitalized at [facility] for a long period of time and had a cardiac workup, the patient was concerned that she was having \"a heart attack\".  Last echocardiogram was in  and showed EF 60%.  Patient seen by Dr. Lee was last seen 2017.  Troponins were negative and " EKG was without changes.  CTA chest and abdomen were negative for acute PE, aortic dissection or other cardiopulmonary process.  Patient was admitted to Hospital medicine for further evaluation and treatment.    TSH was drawn and was 7.690.  Patient will be discharged on levothyroxine 25 µg and is still a follow-up with her PCP in 7 days, but have her TSH checked in 3 months, at which time her levothyroxine should be adjusted.  Patient is to follow-up with Dr. Lee as he has scheduled or advised her.  Patient's repeat troponins remained negative.  Patient states that her chest pain has resolved and has no complaints and is ready to go home.  Patient will be discharged home today with family.      Day of Discharge   HPI:   Patient sitting up in chair and states she is ready to go home.  Angry because she thought she was going first thing this morning.  Explained that everything can be discharged first thing in the morning.  Patient states she feels badly now that she got so upset.  Patient denies any chest pain.  No adverse events overnight.  No family in the room.    Review of Systems  Gen- No fevers, chills  CV- No chest pain, palpitations  Resp- No cough, dyspnea  GI- No N/V/D, abd pain  Otherwise ROS is negative except as mentioned in the HPI.    Vital Signs:   Temp:  [97.9 °F (36.6 °C)-98.9 °F (37.2 °C)] 98.6 °F (37 °C)  Heart Rate:  [] 110  Resp:  [16-20] 18  BP: (131-146)/(61-92) 131/92   Physical Exam:  General: Alert, well-developed well-nourished female in no acute distress    Head: Normocephalic atraumatic    Eyes: PERRLA, EOMI, nonicteric, conjunctiva normal    ENT: Pink, moist mucous membranes    Neck: Supple, nontender, trachea midline without lymphadenopathy, JVD, nuchal rigidity.      Cardiovascular: RRR  no M/R/G +2 DP pulses bilaterally    Respiratory: Nonlabored, symmetrical chest expansion, clear to auscultation bilaterally    Abdomen: Soft, nontender, nondistended,  positive bowel sounds  in all 4 quadrants     Extremities: FROM in upper and lower extremities bilaterally. negative for edema/cyanosis.  Negative calf pain    Skin: Pink/warm/dry.  No rash or lesions noted    Neuro: Alert and oriented to person place time and situation, speech is clear, follows all commands, recent and remote memory intact    Psych: Patient is pleasant and cooperative.  Normal affect.  Negative suicidal ideation or homicidal ideation.  Pertinent  and/or Most Recent Results     Results from last 7 days  Lab Units 03/08/18  0404 03/07/18  2209   WBC 10*3/mm3 8.13 9.20   HEMOGLOBIN g/dL 10.5* 12.2   HEMATOCRIT % 32.7* 37.5   PLATELETS 10*3/mm3 229 267   SODIUM mmol/L 138 138   POTASSIUM mmol/L 4.6 5.0   CHLORIDE mmol/L 110* 107   CO2 mmol/L 25.0 23.0   BUN mg/dL 16 18   CREATININE mg/dL 1.00 1.10   GLUCOSE mg/dL 101* 102*   CALCIUM mg/dL 8.3* 9.0       Results from last 7 days  Lab Units 03/08/18  0652 03/07/18  2209   BILIRUBIN mg/dL  --  1.0   ALK PHOS U/L  --  57   ALT (SGPT) U/L  --  14   AST (SGOT) U/L  --  31   PROTIME Seconds 11.4  --    INR  1.09  --    APTT seconds 25.5  --        Results from last 7 days  Lab Units 03/08/18  0404   CHOLESTEROL mg/dL 183   TRIGLYCERIDES mg/dL 77   HDL CHOL mg/dL 66*       Results from last 7 days  Lab Units 03/08/18  0652 03/08/18  0404 03/07/18  2209   TSH mIU/mL  --  7.690*  --    HEMOGLOBIN A1C %  --  5.40  --    BNP pg/mL  --   --  31.0   TROPONIN I ng/mL <0.006 <0.006  --      Brief Urine Lab Results  (Last result in the past 365 days)      Color   Clarity   Blood   Leuk Est   Nitrite   Protein   CREAT   Urine HCG        05/12/17 1734 Dark Yellow(A) Clear Negative Moderate (2+)(A) Negative Negative             Microbiology Results Abnormal     None        Imaging Results (all)     Procedure Component Value Units Date/Time    XR Chest 1 View [[id number]] Collected:  03/07/18 2135     Updated:  03/07/18 2237    Narrative:       EXAM:    XR Chest, 1 View    CLINICAL HISTORY:    77  years old, female; Pain; Chest pain; Left-sided chest pain; Additional   info: Chest pain triage protocol    TECHNIQUE:    Frontal view of the chest.    COMPARISON:    CR - XR CHEST PA AND LATERAL 2017-04-03 11:16    FINDINGS:    Lungs:  Unremarkable.  No consolidation.    Pleural space:  Unremarkable.  No pneumothorax.    Heart:  Unremarkable.  No cardiomegaly.    Mediastinum:  There is a large hiatal hernia.    Bones/joints:  Unremarkable.      Impression:         No acute findings.    THIS DOCUMENT HAS BEEN ELECTRONICALLY SIGNED BY SAUL GIRALDO MD    CT Angiogram Chest With & Without Contrast [[id number]] Collected:  03/07/18 2204     Updated:  03/08/18 0018    Narrative:       EXAM:    CT Angiography Chest Without and With Intravenous Contrast    CLINICAL HISTORY:    77 years old, female; Pain; Chest pain; Additional info: Cp radiating to   back, dissection protocol    TECHNIQUE:    Axial computed tomographic angiography images of the chest without and with   intravenous contrast using pulmonary embolism protocol.  All CT scans at this   facility use one or more dose reduction techniques, viz.: automated exposure   control; ma/kV adjustment per patient size (including targeted exams where dose   is matched to indication; i.e. head); or iterative reconstruction technique.    MIP reconstructed images were created and reviewed.    Coronal and sagittal reformatted images were created and reviewed.    CONTRAST:    95 mL of Isovue 370 administered intravenously.    COMPARISON:    CR - XR CHEST 1 VW 2018-03-07 22:17    FINDINGS:    Pulmonary arteries:  No evidence of pulmonary embolism.    Aorta:  No evidence of thoracic aortic aneurysm or dissection.    Lungs:  Bibasilar nonspecific lung base density is present, consistent with   dependent atelectasis.  No mass.    Pleural space:  Unremarkable.  No significant effusion.  No pneumothorax.    Heart:  Unremarkable.  No cardiomegaly.  No significant pericardial effusion.     No evidence of RV dysfunction.    Mediastinum:  A large hiatal hernia is present.    Bones/joints:  There are degenerative changes of the spine.  No acute   fracture.  No dislocation.    Soft tissues:  Unremarkable.    Lymph nodes:  Unremarkable.  No enlarged lymph nodes.    Other findings:  Upper abdominal organs are unremarkable.      Impression:       1.  No evidence of pulmonary embolism.  2.  No evidence of thoracic aortic aneurysm or dissection.    THIS DOCUMENT HAS BEEN ELECTRONICALLY SIGNED BY SAUL GIRALDO MD    CT Angiogram Abdomen Pelvis With & Without Contrast [[id number]] Collected:  03/07/18 2204     Updated:  03/08/18 0023    Narrative:       EXAM:    CT Angiography Abdomen and Pelvis Without and With Intravenous Contrast    CLINICAL HISTORY:    77 years old, female; Pain; Other: Chest pain; Additional info: Cp radiating   to back, dissection protocol    TECHNIQUE:    Axial computed tomographic angiography images of the abdomen and pelvis   without and with intravenous contrast.  All CT scans at this facility use one   or more dose reduction techniques, viz.: automated exposure control; ma/kV   adjustment per patient size (including targeted exams where dose is matched to   indication; i.e. head); or iterative reconstruction technique.    MIP reconstructed images were created and reviewed.    Coronal and sagittal reformatted images were created and reviewed.    CONTRAST:    95 mL of Isovue 370 administered intravenously.    COMPARISON:    No relevant prior studies available.    FINDINGS:    Lower thorax:  A large hiatal hernia is present.     VASCULATURE:    Aorta:  No acute findings.  No abdominal aortic aneurysm.  No dissection.    Celiac trunk and mesenteric arteries:  No acute findings.  No occlusion or   significant stenosis.    Renal arteries:  No acute findings.  No occlusion or significant stenosis.    Iliac arteries:  No acute findings.  No occlusion or significant stenosis.     ABDOMEN:     Liver:  Unremarkable.  No mass.    Gallbladder and bile ducts:  Unremarkable.  No calcified stones.  No ductal   dilation.    Pancreas:  Unremarkable.  No ductal dilation.  No mass.    Spleen:  Unremarkable.  No splenomegaly.    Adrenals:  Unremarkable.  No mass.    Kidneys and ureters:  Unremarkable.  No obstructing stones.  No   hydronephrosis.  No solid mass.    Stomach and bowel:  There is moderate colonic stool retention.  No   obstruction.  No mucosal thickening.    Appendix:  No findings to suggest acute appendicitis.     PELVIS:    Bladder:  Unremarkable.  No stones.  No mass.    Reproductive:  Unremarkable as visualized.     ABDOMEN and PELVIS:    Intraperitoneal space:  Unremarkable.  No significant fluid collection.  No   free air.    Bones/joints:  There are degenerative changes of the spine.  No acute   fracture.  No dislocation.    Soft tissues: Fat-containing periumbilical hernia.    Lymph nodes:  Unremarkable.  No enlarged lymph nodes.      Impression:         No evidence of abdominal aortic aneurysm or dissection.    THIS DOCUMENT HAS BEEN ELECTRONICALLY SIGNED BY SAUL GIRALDO MD        Discharge Details      Yanet Grove   Home Medication Instructions LONG:[id number]    Printed on:03/09/18 4744   Medication Information                      aspirin 81 MG EC tablet  Take 81 mg by mouth Daily.             cholecalciferol (VITAMIN D3) 1000 UNITS tablet  Take 2,000 Units by mouth daily.             diphenhydrAMINE (NIGHTTIME SLEEP AID) 25 MG tablet  Take 25 mg by mouth at night as needed for sleep.             doxycycline (MONODOX) 100 MG capsule  Take 100 mg by mouth 2 (Two) Times a Day.             ferrous sulfate 325 (65 FE) MG tablet  Take 325 mg by mouth daily with breakfast.             levothyroxine (SYNTHROID, LEVOTHROID) 25 MCG tablet  Take 1 tablet by mouth Daily.             Raloxifene HCl (EVISTA PO)  Take 60 mg by mouth daily.               Discharge Disposition:  Home or Self  Care    Discharge Diet:  Diet Instructions     Diet: Regular, Cardiac; Thin       Discharge Diet:   Regular  Cardiac      Fluid Consistency:  Thin               Discharge Activity:   Activity Instructions     Activity as Tolerated                   No future appointments.    Additional Instructions for the Follow-ups that You Need to Schedule     Discharge Follow-up with PCP    As directed    Follow Up Details:  Darin Baugh MD within the next 7 days;  please call and schedule point upon patient's discharge; TSH needs to be checked in the next 3 months           Discharge Follow-up with Specified Provider: Dr. Lee; next 3 months; please schedule appointment prior to patient's discharge    As directed    To:  Dr. Lee; next 3 months; please schedule appointment prior to patient's discharge                   Time Spent on Discharge:  45 minutes    Electronically signed by ORION Kilpatrick, 03/09/18, 11:16 AM.

## 2018-03-09 NOTE — PLAN OF CARE
Problem: Acute Coronary Syndrome (ACS) (Adult)  Goal: Signs and Symptoms of Listed Potential Problems Will be Absent or Manageable (Acute Coronary Syndrome)  Outcome: Ongoing (interventions implemented as appropriate)  Pt rested well this shift with no complaints of pain.  VSS and NSR on telemetry.

## 2018-05-22 ENCOUNTER — LAB REQUISITION (OUTPATIENT)
Dept: LAB | Facility: HOSPITAL | Age: 78
End: 2018-05-22

## 2018-05-22 DIAGNOSIS — Z00.00 ROUTINE GENERAL MEDICAL EXAMINATION AT A HEALTH CARE FACILITY: ICD-10-CM

## 2018-05-22 LAB
ALBUMIN SERPL-MCNC: 3.8 G/DL (ref 3.2–4.8)
ALBUMIN/GLOB SERPL: 1.8 G/DL (ref 1.5–2.5)
ALP SERPL-CCNC: 51 U/L (ref 25–100)
ALT SERPL W P-5'-P-CCNC: 10 U/L (ref 7–40)
ANION GAP SERPL CALCULATED.3IONS-SCNC: 11 MMOL/L (ref 3–11)
AST SERPL-CCNC: 15 U/L (ref 0–33)
BASOPHILS # BLD AUTO: 0.07 10*3/MM3 (ref 0–0.2)
BASOPHILS NFR BLD AUTO: 1.2 % (ref 0–1)
BILIRUB SERPL-MCNC: 1 MG/DL (ref 0.3–1.2)
BUN BLD-MCNC: 18 MG/DL (ref 9–23)
BUN/CREAT SERPL: 13.8 (ref 7–25)
CALCIUM SPEC-SCNC: 9.2 MG/DL (ref 8.7–10.4)
CHLORIDE SERPL-SCNC: 104 MMOL/L (ref 99–109)
CO2 SERPL-SCNC: 24 MMOL/L (ref 20–31)
CREAT BLD-MCNC: 1.3 MG/DL (ref 0.6–1.3)
CRP SERPL-MCNC: 0.04 MG/DL (ref 0–1)
DEPRECATED RDW RBC AUTO: 44.5 FL (ref 37–54)
EOSINOPHIL # BLD AUTO: 0.22 10*3/MM3 (ref 0–0.3)
EOSINOPHIL NFR BLD AUTO: 3.7 % (ref 0–3)
ERYTHROCYTE [DISTWIDTH] IN BLOOD BY AUTOMATED COUNT: 13.5 % (ref 11.3–14.5)
ERYTHROCYTE [SEDIMENTATION RATE] IN BLOOD: <1 MM/HR (ref 0–30)
GFR SERPL CREATININE-BSD FRML MDRD: 40 ML/MIN/1.73
GLOBULIN UR ELPH-MCNC: 2.1 GM/DL
GLUCOSE BLD-MCNC: 178 MG/DL (ref 70–100)
HCT VFR BLD AUTO: 37.1 % (ref 34.5–44)
HGB BLD-MCNC: 12 G/DL (ref 11.5–15.5)
IMM GRANULOCYTES # BLD: 0.01 10*3/MM3 (ref 0–0.03)
IMM GRANULOCYTES NFR BLD: 0.2 % (ref 0–0.6)
LYMPHOCYTES # BLD AUTO: 1.99 10*3/MM3 (ref 0.6–4.8)
LYMPHOCYTES NFR BLD AUTO: 33.3 % (ref 24–44)
MCH RBC QN AUTO: 29.2 PG (ref 27–31)
MCHC RBC AUTO-ENTMCNC: 32.3 G/DL (ref 32–36)
MCV RBC AUTO: 90.3 FL (ref 80–99)
MONOCYTES # BLD AUTO: 0.54 10*3/MM3 (ref 0–1)
MONOCYTES NFR BLD AUTO: 9 % (ref 0–12)
NEUTROPHILS # BLD AUTO: 3.15 10*3/MM3 (ref 1.5–8.3)
NEUTROPHILS NFR BLD AUTO: 52.8 % (ref 41–71)
PLATELET # BLD AUTO: 252 10*3/MM3 (ref 150–450)
PMV BLD AUTO: 10.5 FL (ref 6–12)
POTASSIUM BLD-SCNC: 4.7 MMOL/L (ref 3.5–5.5)
PROT SERPL-MCNC: 5.9 G/DL (ref 5.7–8.2)
RBC # BLD AUTO: 4.11 10*6/MM3 (ref 3.89–5.14)
SODIUM BLD-SCNC: 139 MMOL/L (ref 132–146)
WBC NRBC COR # BLD: 5.97 10*3/MM3 (ref 3.5–10.8)

## 2018-05-22 PROCEDURE — 80053 COMPREHEN METABOLIC PANEL: CPT | Performed by: INTERNAL MEDICINE

## 2018-05-22 PROCEDURE — 85025 COMPLETE CBC W/AUTO DIFF WBC: CPT | Performed by: INTERNAL MEDICINE

## 2018-05-22 PROCEDURE — 86140 C-REACTIVE PROTEIN: CPT | Performed by: INTERNAL MEDICINE

## 2018-05-22 PROCEDURE — 85652 RBC SED RATE AUTOMATED: CPT | Performed by: INTERNAL MEDICINE

## 2018-05-22 PROCEDURE — 36415 COLL VENOUS BLD VENIPUNCTURE: CPT | Performed by: INTERNAL MEDICINE

## 2018-06-04 NOTE — PROGRESS NOTES
[facility] at [facility]  Office Progress Note  Yanet Grove  1940  [phone]      Visit Date: 6/13/2018     PCP: Darin Baugh MD  [address] [facility] [address]    IDENTIFICATION: A 77 y.o. female  2018 from [facility].    Chief Complaint   Patient presents with   • Coronary Artery Disease   • Hyperlipidemia       PROBLEM LIST:   1. chest pain syndrome  1. LHC 2006, non-obstructive disease, LV 60% per Rukavina.  2. Echo 2006 wnl with mild aortic sclerosis, mild mr, mild tr  2. HLD  1. 5/12: , TG 98, HDL 52,   3. Glaucoma  1. followed per Dixie.  4. GERD   1. with positive h.pylori per Dr. Ivan 2010  5. Polymyalgia rheumatica  6. Infection with continued abx as of 7/17- Filippo ID  7. Surgical Hx:  1. Knee replacement  2. Elbow fracture s/p ORIF 2016, sadonaldi       Allergies  Allergies   Allergen Reactions   • Phenergan [Promethazine Hcl] Hives       Current Medications    Current Outpatient Prescriptions:   •  aspirin 81 MG EC tablet, Take 81 mg by mouth Daily., Disp: , Rfl:   •  cholecalciferol (VITAMIN D3) 1000 UNITS tablet, Take 2,000 Units by mouth daily., Disp: , Rfl:   •  diphenhydrAMINE (NIGHTTIME SLEEP AID) 25 MG tablet, Take 25 mg by mouth at night as needed for sleep., Disp: , Rfl:   •  doxycycline (MONODOX) 100 MG capsule, Take 100 mg by mouth 2 (Two) Times a Day., Disp: , Rfl:   •  escitalopram (LEXAPRO) 10 MG tablet, Take 10 mg by mouth Daily., Disp: , Rfl:   •  ferrous sulfate 325 (65 FE) MG tablet, Take 325 mg by mouth daily with breakfast., Disp: , Rfl:   •  Raloxifene HCl (EVISTA PO), Take 60 mg by mouth daily., Disp: , Rfl:   No current facility-administered medications for this visit.     Facility-Administered Medications Ordered in Other Visits:   •  bupivacaine PF (MARCAINE) 0.25 % injection, , , PRN, Sean Brown, CRNA, 50 mg at 09/13/16 9490    History of Present Illness   Patient presents in follow up. She continues to grieve  "regarding loss for  in April of this year after 50 years of marriage.  She is having no overt cardiac issues.    ROS:  All systems have been reviewed and are negative with the exception of those mentioned in the HPI.    OBJECTIVE:  Vitals:    06/13/18 1114   BP: 143/63   BP Location: Left arm   Patient Position: Sitting   Pulse: 74   Weight: 60.6 kg (133 lb 9.6 oz)   Height: 162.6 cm (64\")     Physical Exam   Constitutional: She is oriented to person, place, and time. She appears well-developed and well-nourished. No distress.   Neck: Normal range of motion. Neck supple. No hepatojugular reflux and no JVD present. Carotid bruit is not present. No tracheal deviation present. No thyromegaly present.   Cardiovascular: Normal rate, regular rhythm, S1 normal, S2 normal, intact distal pulses and normal pulses.  PMI is not displaced.  Exam reveals no gallop, no distant heart sounds, no friction rub, no midsystolic click and no opening snap.    No murmur heard.  Pulses:       Radial pulses are 2+ on the right side, and 2+ on the left side.        Dorsalis pedis pulses are 2+ on the right side, and 2+ on the left side.        Posterior tibial pulses are 2+ on the right side, and 2+ on the left side.   Pulmonary/Chest: Effort normal and breath sounds normal. She has no wheezes. She has no rales.   Abdominal: Soft. Bowel sounds are normal. She exhibits no mass. There is no tenderness. There is no guarding.   Musculoskeletal: Normal range of motion. She exhibits no edema or tenderness.   Neurological: She is alert and oriented to person, place, and time.       Diagnostic Data:  Procedures      ASSESSMENT:   Diagnosis Plan   1. Coronary artery disease involving native coronary artery of native heart without angina pectoris     2. Mixed hyperlipidemia         PLAN:  1. Historically nonobstructive with no new anginal equivalent. Continue risk reduction and medical management.  2. On statin therapy. PCP to follow routine " yearly lipid panel.     Follow up with us in 12 months or sooner as needed.    Scribed for Darin Lee MD by Destini Ching PA-C. 6/13/2018  1:00 PM  I, Darin Lee MD, personally performed the services described in this documentation as scribed by the above named individual in my presence, and it is both accurate and complete.  6/13/2018  1:01 PM

## 2018-06-13 ENCOUNTER — OFFICE VISIT (OUTPATIENT)
Dept: CARDIOLOGY | Facility: CLINIC | Age: 78
End: 2018-06-13

## 2018-06-13 VITALS
HEIGHT: 64 IN | BODY MASS INDEX: 22.81 KG/M2 | SYSTOLIC BLOOD PRESSURE: 143 MMHG | DIASTOLIC BLOOD PRESSURE: 63 MMHG | HEART RATE: 74 BPM | WEIGHT: 133.6 LBS

## 2018-06-13 DIAGNOSIS — E78.2 MIXED HYPERLIPIDEMIA: ICD-10-CM

## 2018-06-13 DIAGNOSIS — I25.10 CORONARY ARTERY DISEASE INVOLVING NATIVE CORONARY ARTERY OF NATIVE HEART WITHOUT ANGINA PECTORIS: Primary | ICD-10-CM

## 2018-06-13 PROCEDURE — 99213 OFFICE O/P EST LOW 20 MIN: CPT | Performed by: INTERNAL MEDICINE

## 2018-06-13 RX ORDER — ESCITALOPRAM OXALATE 10 MG/1
10 TABLET ORAL DAILY
COMMUNITY
End: 2020-07-08

## 2018-08-21 ENCOUNTER — LAB REQUISITION (OUTPATIENT)
Dept: LAB | Facility: HOSPITAL | Age: 78
End: 2018-08-21

## 2018-08-21 DIAGNOSIS — Z00.00 ROUTINE GENERAL MEDICAL EXAMINATION AT A HEALTH CARE FACILITY: ICD-10-CM

## 2018-08-21 LAB
ALBUMIN SERPL-MCNC: 4.08 G/DL (ref 3.2–4.8)
ALBUMIN/GLOB SERPL: 1.8 G/DL (ref 1.5–2.5)
ALP SERPL-CCNC: 52 U/L (ref 25–100)
ALT SERPL W P-5'-P-CCNC: 10 U/L (ref 7–40)
ANION GAP SERPL CALCULATED.3IONS-SCNC: 4 MMOL/L (ref 3–11)
AST SERPL-CCNC: 20 U/L (ref 0–33)
BASOPHILS # BLD AUTO: 0.1 10*3/MM3 (ref 0–0.2)
BASOPHILS NFR BLD AUTO: 1.9 % (ref 0–1)
BILIRUB SERPL-MCNC: 0.9 MG/DL (ref 0.3–1.2)
BUN BLD-MCNC: 17 MG/DL (ref 9–23)
BUN/CREAT SERPL: 14.2 (ref 7–25)
CALCIUM SPEC-SCNC: 8.8 MG/DL (ref 8.7–10.4)
CHLORIDE SERPL-SCNC: 107 MMOL/L (ref 99–109)
CO2 SERPL-SCNC: 30 MMOL/L (ref 20–31)
CREAT BLD-MCNC: 1.2 MG/DL (ref 0.6–1.3)
CRP SERPL-MCNC: 0.08 MG/DL (ref 0–1)
DEPRECATED RDW RBC AUTO: 46.7 FL (ref 37–54)
EOSINOPHIL # BLD AUTO: 0.21 10*3/MM3 (ref 0–0.3)
EOSINOPHIL NFR BLD AUTO: 3.9 % (ref 0–3)
ERYTHROCYTE [DISTWIDTH] IN BLOOD BY AUTOMATED COUNT: 14.1 % (ref 11.3–14.5)
ERYTHROCYTE [SEDIMENTATION RATE] IN BLOOD: <1 MM/HR (ref 0–30)
GFR SERPL CREATININE-BSD FRML MDRD: 44 ML/MIN/1.73
GLOBULIN UR ELPH-MCNC: 2.2 GM/DL
GLUCOSE BLD-MCNC: 76 MG/DL (ref 70–100)
HCT VFR BLD AUTO: 37.5 % (ref 34.5–44)
HGB BLD-MCNC: 11.9 G/DL (ref 11.5–15.5)
IMM GRANULOCYTES # BLD: 0.02 10*3/MM3 (ref 0–0.03)
IMM GRANULOCYTES NFR BLD: 0.4 % (ref 0–0.6)
LYMPHOCYTES # BLD AUTO: 1.64 10*3/MM3 (ref 0.6–4.8)
LYMPHOCYTES NFR BLD AUTO: 30.4 % (ref 24–44)
MCH RBC QN AUTO: 28.9 PG (ref 27–31)
MCHC RBC AUTO-ENTMCNC: 31.7 G/DL (ref 32–36)
MCV RBC AUTO: 91 FL (ref 80–99)
MONOCYTES # BLD AUTO: 0.65 10*3/MM3 (ref 0–1)
MONOCYTES NFR BLD AUTO: 12 % (ref 0–12)
NEUTROPHILS # BLD AUTO: 2.8 10*3/MM3 (ref 1.5–8.3)
NEUTROPHILS NFR BLD AUTO: 51.8 % (ref 41–71)
PLATELET # BLD AUTO: 226 10*3/MM3 (ref 150–450)
PMV BLD AUTO: 11.3 FL (ref 6–12)
POTASSIUM BLD-SCNC: 4.6 MMOL/L (ref 3.5–5.5)
PROT SERPL-MCNC: 6.3 G/DL (ref 5.7–8.2)
RBC # BLD AUTO: 4.12 10*6/MM3 (ref 3.89–5.14)
SODIUM BLD-SCNC: 141 MMOL/L (ref 132–146)
WBC NRBC COR # BLD: 5.4 10*3/MM3 (ref 3.5–10.8)

## 2018-08-21 PROCEDURE — 36415 COLL VENOUS BLD VENIPUNCTURE: CPT | Performed by: INTERNAL MEDICINE

## 2018-08-21 PROCEDURE — 85025 COMPLETE CBC W/AUTO DIFF WBC: CPT | Performed by: INTERNAL MEDICINE

## 2018-08-21 PROCEDURE — 80053 COMPREHEN METABOLIC PANEL: CPT | Performed by: INTERNAL MEDICINE

## 2018-08-21 PROCEDURE — 86140 C-REACTIVE PROTEIN: CPT | Performed by: INTERNAL MEDICINE

## 2018-11-20 ENCOUNTER — LAB REQUISITION (OUTPATIENT)
Dept: LAB | Facility: HOSPITAL | Age: 78
End: 2018-11-20

## 2018-11-20 DIAGNOSIS — Z00.00 ROUTINE GENERAL MEDICAL EXAMINATION AT A HEALTH CARE FACILITY: ICD-10-CM

## 2018-11-20 LAB
ANION GAP SERPL CALCULATED.3IONS-SCNC: 5 MMOL/L (ref 3–11)
BASOPHILS # BLD AUTO: 0.09 10*3/MM3 (ref 0–0.2)
BASOPHILS NFR BLD AUTO: 1.4 % (ref 0–1)
BUN BLD-MCNC: 21 MG/DL (ref 9–23)
BUN/CREAT SERPL: 17.5 (ref 7–25)
CALCIUM SPEC-SCNC: 8.9 MG/DL (ref 8.7–10.4)
CHLORIDE SERPL-SCNC: 104 MMOL/L (ref 99–109)
CO2 SERPL-SCNC: 28 MMOL/L (ref 20–31)
CREAT BLD-MCNC: 1.2 MG/DL (ref 0.6–1.3)
CRP SERPL-MCNC: 0.39 MG/DL (ref 0–1)
DEPRECATED RDW RBC AUTO: 46.5 FL (ref 37–54)
EOSINOPHIL # BLD AUTO: 0.17 10*3/MM3 (ref 0–0.3)
EOSINOPHIL NFR BLD AUTO: 2.7 % (ref 0–3)
ERYTHROCYTE [DISTWIDTH] IN BLOOD BY AUTOMATED COUNT: 13.6 % (ref 11.3–14.5)
ERYTHROCYTE [SEDIMENTATION RATE] IN BLOOD: <1 MM/HR (ref 0–30)
GFR SERPL CREATININE-BSD FRML MDRD: 44 ML/MIN/1.73
GLUCOSE BLD-MCNC: 86 MG/DL (ref 70–100)
HCT VFR BLD AUTO: 38.6 % (ref 34.5–44)
HGB BLD-MCNC: 12.2 G/DL (ref 11.5–15.5)
IMM GRANULOCYTES # BLD: 0 10*3/MM3 (ref 0–0.03)
IMM GRANULOCYTES NFR BLD: 0 % (ref 0–0.6)
LYMPHOCYTES # BLD AUTO: 1.83 10*3/MM3 (ref 0.6–4.8)
LYMPHOCYTES NFR BLD AUTO: 29.2 % (ref 24–44)
MCH RBC QN AUTO: 29.5 PG (ref 27–31)
MCHC RBC AUTO-ENTMCNC: 31.6 G/DL (ref 32–36)
MCV RBC AUTO: 93.2 FL (ref 80–99)
MONOCYTES # BLD AUTO: 0.43 10*3/MM3 (ref 0–1)
MONOCYTES NFR BLD AUTO: 6.9 % (ref 0–12)
NEUTROPHILS # BLD AUTO: 3.74 10*3/MM3 (ref 1.5–8.3)
NEUTROPHILS NFR BLD AUTO: 59.8 % (ref 41–71)
PLATELET # BLD AUTO: 288 10*3/MM3 (ref 150–450)
PMV BLD AUTO: 9.8 FL (ref 6–12)
POTASSIUM BLD-SCNC: 5.1 MMOL/L (ref 3.5–5.5)
RBC # BLD AUTO: 4.14 10*6/MM3 (ref 3.89–5.14)
SODIUM BLD-SCNC: 137 MMOL/L (ref 132–146)
WBC NRBC COR # BLD: 6.26 10*3/MM3 (ref 3.5–10.8)

## 2018-11-20 PROCEDURE — 85025 COMPLETE CBC W/AUTO DIFF WBC: CPT | Performed by: INTERNAL MEDICINE

## 2018-11-20 PROCEDURE — 86140 C-REACTIVE PROTEIN: CPT | Performed by: INTERNAL MEDICINE

## 2018-11-20 PROCEDURE — 36415 COLL VENOUS BLD VENIPUNCTURE: CPT | Performed by: INTERNAL MEDICINE

## 2018-11-20 PROCEDURE — 85652 RBC SED RATE AUTOMATED: CPT | Performed by: INTERNAL MEDICINE

## 2018-11-20 PROCEDURE — 80048 BASIC METABOLIC PNL TOTAL CA: CPT | Performed by: INTERNAL MEDICINE

## 2019-03-19 ENCOUNTER — LAB REQUISITION (OUTPATIENT)
Dept: LAB | Facility: HOSPITAL | Age: 79
End: 2019-03-19

## 2019-03-19 DIAGNOSIS — Z00.00 ROUTINE GENERAL MEDICAL EXAMINATION AT A HEALTH CARE FACILITY: ICD-10-CM

## 2019-03-19 LAB
ALBUMIN SERPL-MCNC: 4.35 G/DL (ref 3.2–4.8)
ALBUMIN/GLOB SERPL: 2.1 G/DL (ref 1.5–2.5)
ALP SERPL-CCNC: 62 U/L (ref 25–100)
ALT SERPL W P-5'-P-CCNC: 10 U/L (ref 7–40)
ANION GAP SERPL CALCULATED.3IONS-SCNC: 7 MMOL/L (ref 3–11)
AST SERPL-CCNC: 20 U/L (ref 0–33)
BASOPHILS # BLD AUTO: 0.1 10*3/MM3 (ref 0–0.2)
BASOPHILS NFR BLD AUTO: 1.5 % (ref 0–1)
BILIRUB SERPL-MCNC: 1.4 MG/DL (ref 0.3–1.2)
BUN BLD-MCNC: 20 MG/DL (ref 9–23)
BUN/CREAT SERPL: 16.1 (ref 7–25)
CALCIUM SPEC-SCNC: 10.3 MG/DL (ref 8.7–10.4)
CHLORIDE SERPL-SCNC: 104 MMOL/L (ref 99–109)
CO2 SERPL-SCNC: 25 MMOL/L (ref 20–31)
CREAT BLD-MCNC: 1.24 MG/DL (ref 0.6–1.3)
CRP SERPL-MCNC: 0.44 MG/DL (ref 0–1)
DEPRECATED RDW RBC AUTO: 44.3 FL (ref 37–54)
EOSINOPHIL # BLD AUTO: 0.22 10*3/MM3 (ref 0–0.3)
EOSINOPHIL NFR BLD AUTO: 3.3 % (ref 0–3)
ERYTHROCYTE [DISTWIDTH] IN BLOOD BY AUTOMATED COUNT: 13.4 % (ref 11.3–14.5)
ERYTHROCYTE [SEDIMENTATION RATE] IN BLOOD: <1 MM/HR (ref 0–30)
GFR SERPL CREATININE-BSD FRML MDRD: 42 ML/MIN/1.73
GLOBULIN UR ELPH-MCNC: 2.1 GM/DL
GLUCOSE BLD-MCNC: 88 MG/DL (ref 70–100)
HCT VFR BLD AUTO: 38.9 % (ref 34.5–44)
HGB BLD-MCNC: 12.4 G/DL (ref 11.5–15.5)
IMM GRANULOCYTES # BLD AUTO: 0.01 10*3/MM3 (ref 0–0.05)
IMM GRANULOCYTES NFR BLD AUTO: 0.2 % (ref 0–0.6)
LYMPHOCYTES # BLD AUTO: 2.11 10*3/MM3 (ref 0.6–4.8)
LYMPHOCYTES NFR BLD AUTO: 31.8 % (ref 24–44)
MCH RBC QN AUTO: 29 PG (ref 27–31)
MCHC RBC AUTO-ENTMCNC: 31.9 G/DL (ref 32–36)
MCV RBC AUTO: 91.1 FL (ref 80–99)
MONOCYTES # BLD AUTO: 0.41 10*3/MM3 (ref 0–1)
MONOCYTES NFR BLD AUTO: 6.2 % (ref 0–12)
NEUTROPHILS # BLD AUTO: 3.79 10*3/MM3 (ref 1.5–8.3)
NEUTROPHILS NFR BLD AUTO: 57.2 % (ref 41–71)
NRBC BLD AUTO-RTO: 0 /100 WBC (ref 0–0)
PLATELET # BLD AUTO: 281 10*3/MM3 (ref 150–450)
PMV BLD AUTO: 10.2 FL (ref 6–12)
POTASSIUM BLD-SCNC: 5 MMOL/L (ref 3.5–5.5)
PROT SERPL-MCNC: 6.4 G/DL (ref 5.7–8.2)
RBC # BLD AUTO: 4.27 10*6/MM3 (ref 3.89–5.14)
SODIUM BLD-SCNC: 136 MMOL/L (ref 132–146)
WBC NRBC COR # BLD: 6.63 10*3/MM3 (ref 3.5–10.8)

## 2019-03-19 PROCEDURE — 80053 COMPREHEN METABOLIC PANEL: CPT | Performed by: INTERNAL MEDICINE

## 2019-03-19 PROCEDURE — 85025 COMPLETE CBC W/AUTO DIFF WBC: CPT | Performed by: INTERNAL MEDICINE

## 2019-03-19 PROCEDURE — 86140 C-REACTIVE PROTEIN: CPT | Performed by: INTERNAL MEDICINE

## 2019-06-26 ENCOUNTER — OFFICE VISIT (OUTPATIENT)
Dept: CARDIOLOGY | Facility: CLINIC | Age: 79
End: 2019-06-26

## 2019-06-26 VITALS
SYSTOLIC BLOOD PRESSURE: 140 MMHG | HEART RATE: 71 BPM | DIASTOLIC BLOOD PRESSURE: 62 MMHG | WEIGHT: 152 LBS | BODY MASS INDEX: 26.93 KG/M2 | OXYGEN SATURATION: 97 % | HEIGHT: 63 IN

## 2019-06-26 DIAGNOSIS — I25.10 CORONARY ARTERY DISEASE INVOLVING NATIVE CORONARY ARTERY OF NATIVE HEART WITHOUT ANGINA PECTORIS: Primary | ICD-10-CM

## 2019-06-26 DIAGNOSIS — E78.5 DYSLIPIDEMIA: ICD-10-CM

## 2019-06-26 PROCEDURE — 99214 OFFICE O/P EST MOD 30 MIN: CPT | Performed by: INTERNAL MEDICINE

## 2019-06-26 NOTE — PROGRESS NOTES
[facility] at [facility]  Office Progress Note  Yanet Grove  1940  [phone]      Visit Date: 6/26/2019     PCP: Darin Baugh MD  [address] [facility] [address]    IDENTIFICATION: A 78 y.o. female  2018 from [facility].    Chief Complaint   Patient presents with   • Hypertension       PROBLEM LIST:   1. chest pain syndrome  1. LHC 2006, non-obstructive disease, LV 60% per Rukavina.  2. Echo 2006 wnl with mild aortic sclerosis, mild mr, mild tr  2. HLD  1. 5/12: , TG 98, HDL 52,   3. Glaucoma  1. followed per Dixie.  4. GERD   1. with positive h.pylori per Dr. Ivan 2010  5. Polymyalgia rheumatica  6. Infection with continued abx as of 7/17- Filippo ID  7. Surgical Hx:  1. Knee replacement  2. Elbow fracture s/p ORIF 2016, sajadi       Allergies  Allergies   Allergen Reactions   • Phenergan [Promethazine Hcl] Hives       Current Medications    Current Outpatient Medications:   •  aspirin 81 MG EC tablet, Take 81 mg by mouth Daily., Disp: , Rfl:   •  cholecalciferol (VITAMIN D3) 1000 UNITS tablet, Take 2,000 Units by mouth daily., Disp: , Rfl:   •  diphenhydrAMINE (NIGHTTIME SLEEP AID) 25 MG tablet, Take 25 mg by mouth at night as needed for sleep., Disp: , Rfl:   •  escitalopram (LEXAPRO) 10 MG tablet, Take 10 mg by mouth Daily. 0.5 tablet daily, Disp: , Rfl:   •  ferrous sulfate 325 (65 FE) MG tablet, Take 325 mg by mouth daily with breakfast., Disp: , Rfl:   •  Mirabegron ER (MYRBETRIQ) 50 MG tablet sustained-release 24 hour 24 hr tablet, Take 50 mg by mouth Daily., Disp: , Rfl:   •  Raloxifene HCl (EVISTA PO), Take 60 mg by mouth daily., Disp: , Rfl:   •  Unable to find, 1 each 1 (One) Time. Med Name: nury professional 15mg active cannabinoids 1 tablet daily, Disp: , Rfl:   No current facility-administered medications for this visit.     Facility-Administered Medications Ordered in Other Visits:   •  bupivacaine PF (MARCAINE) 0.25 % injection, , ,  "Kevin HASSAN John MOsvaldo, CRNA, 50 mg at 09/13/16 0759    History of Present Illness   Patient presents in follow up. She continues to grieve regarding loss for  in April 2018 after 50 years of marriage. Has gained nearly 20 lbs since last visit. BP is slightly elevated. Dog recently passed away. Has restarted lexapro.         OBJECTIVE:  Vitals:    06/26/19 1034   BP: 140/62   BP Location: Left arm   Patient Position: Sitting   Pulse: 71   SpO2: 97%   Weight: 68.9 kg (152 lb)   Height: 160 cm (63\")     Physical Exam   Constitutional: She is oriented to person, place, and time. She appears well-developed and well-nourished. No distress.   Neck: Normal range of motion. Neck supple. No hepatojugular reflux and no JVD present. Carotid bruit is not present. No tracheal deviation present. No thyromegaly present.   Cardiovascular: Normal rate, regular rhythm, S1 normal, S2 normal, intact distal pulses and normal pulses. PMI is not displaced. Exam reveals no gallop, no distant heart sounds, no friction rub, no midsystolic click and no opening snap.   No murmur heard.  Pulses:       Radial pulses are 2+ on the right side, and 2+ on the left side.        Dorsalis pedis pulses are 2+ on the right side, and 2+ on the left side.        Posterior tibial pulses are 2+ on the right side, and 2+ on the left side.   Pulmonary/Chest: Effort normal and breath sounds normal. She has no wheezes. She has no rales.   Abdominal: Soft. Bowel sounds are normal. She exhibits no mass. There is no tenderness. There is no guarding.   Musculoskeletal: Normal range of motion. She exhibits no edema or tenderness.   Neurological: She is alert and oriented to person, place, and time.       Diagnostic Data:  Procedures      ASSESSMENT:   Diagnosis Plan   1. Coronary artery disease involving native coronary artery of native heart without angina pectoris     2. Dyslipidemia         PLAN:  1. Historically nonobstructive with no new anginal " equivalent. Continue risk reduction and medical management.  2. On statin therapy. PCP to follow routine yearly lipid panel.   3. Counseled on getting regular exercise    Follow up with us in 12 months or sooner as needed.       Scribed for Darin Lee MD by Destini Ching PA-C. 6/26/2019  11:54 AM   I, Darin Lee MD, personally performed the services described in this documentation as scribed by the above named individual in my presence, and it is both accurate and complete.  6/26/2019  11:54 AM

## 2020-07-06 NOTE — PROGRESS NOTES
[facility] at [facility]  Office Progress Note  Yanet Grove  1940  [address] [place] [address]       Visit Date: 07/08/20    PCP: Darin Baugh MD  [address]  [facility] [address]    IDENTIFICATION: A 79 y.o. female   2018 from [facility].    PROBLEM LIST:   1. chest pain syndrome  1. LHC 2006, non-obstructive disease, LV 60% per Rukavina.  2. Echo 2006 wnl with mild aortic sclerosis, mild mr, mild tr  2. HLD  1. 5/12: , TG 98, HDL 52,   3. Glaucoma  1. followed per [facility].  4. GERD   1. with positive h.pylori per Dr. Ivan 2010  5. ckd 3- 1.4 [facility]  6. Polymyalgia rheumatica  7. Infection with continued abx as of 7/17- Filippo ID  8. Surgical Hx:  1. Knee replacement  2. Elbow fracture s/p ORIF 2016, [facility]       Chief Complaint   Patient presents with   • Coronary Artery Disease       Allergies  Allergies   Allergen Reactions   • Phenergan [Promethazine Hcl] Hives       Current Medications    Current Outpatient Medications:   •  aspirin 81 MG EC tablet, Take 81 mg by mouth Daily., Disp: , Rfl:   •  cholecalciferol (VITAMIN D3) 1000 UNITS tablet, Take 2,000 Units by mouth daily., Disp: , Rfl:   •  ferrous sulfate 325 (65 FE) MG tablet, Take 325 mg by mouth daily with breakfast., Disp: , Rfl:   •  Mirabegron ER (MYRBETRIQ) 50 MG tablet sustained-release 24 hour 24 hr tablet, Take 50 mg by mouth Daily., Disp: , Rfl:   •  Raloxifene HCl (EVISTA PO), Take 60 mg by mouth daily., Disp: , Rfl:   No current facility-administered medications for this visit.     Facility-Administered Medications Ordered in Other Visits:   •  bupivacaine PF (MARCAINE) 0.25 % injection, , , PRN, Sean Brown CRNA, 50 mg at 09/13/16 0759      History of Present Illness   Yanet Grove is a 79 y.o. year old female here for follow up.    Pt denies any chest pain, dyspnea, dyspnea on exertion, orthopnea, PND, palpitations, lower extremity edema, or  "claudication.  Stress as her son underwent prostate cancer treatment this year      OBJECTIVE:  Vitals:    07/08/20 1030   BP: 132/72   BP Location: Right arm   Patient Position: Sitting   Pulse: 62   Temp: 96 °F (35.6 °C)   SpO2: 99%   Weight: 72 kg (158 lb 11.2 oz)   Height: 162.6 cm (64\")     Physical Exam   Constitutional: She appears well-developed and well-nourished.   Neck: Normal range of motion. Neck supple. No hepatojugular reflux and no JVD present. Carotid bruit is not present. No tracheal deviation present. No thyromegaly present.   Cardiovascular: Normal rate, regular rhythm, S1 normal, S2 normal, intact distal pulses and normal pulses. PMI is not displaced. Exam reveals no gallop, no distant heart sounds, no friction rub, no midsystolic click and no opening snap.   No murmur heard.  Pulses:       Radial pulses are 2+ on the right side, and 2+ on the left side.        Dorsalis pedis pulses are 2+ on the right side, and 2+ on the left side.        Posterior tibial pulses are 2+ on the right side, and 2+ on the left side.   Pulmonary/Chest: Effort normal and breath sounds normal. She has no wheezes. She has no rales.   Abdominal: Soft. Bowel sounds are normal. She exhibits no mass. There is no tenderness. There is no guarding.       Diagnostic Data:  Procedures      ASSESSMENT:   Diagnosis Plan   1. Chest pain on breathing     2. Mixed hyperlipidemia         PLAN:  Atypical historical chest pain with no anginal equivalent continued observation    Dyslipidemia following labs with dietary compliance    Darin Baugh MD, thank you for referring Ms. Grove for evaluation.  I have forwarded my electronically generated recommendations to you for review.  Please do not hesitate to call with any questions.      Darin Lee MD, [facility]  "

## 2020-07-08 ENCOUNTER — OFFICE VISIT (OUTPATIENT)
Dept: CARDIOLOGY | Facility: CLINIC | Age: 80
End: 2020-07-08

## 2020-07-08 VITALS
DIASTOLIC BLOOD PRESSURE: 72 MMHG | BODY MASS INDEX: 27.1 KG/M2 | HEIGHT: 64 IN | OXYGEN SATURATION: 99 % | SYSTOLIC BLOOD PRESSURE: 132 MMHG | TEMPERATURE: 96 F | HEART RATE: 62 BPM | WEIGHT: 158.7 LBS

## 2020-07-08 DIAGNOSIS — E78.2 MIXED HYPERLIPIDEMIA: ICD-10-CM

## 2020-07-08 DIAGNOSIS — R07.1 CHEST PAIN ON BREATHING: Primary | ICD-10-CM

## 2020-07-08 PROCEDURE — 99213 OFFICE O/P EST LOW 20 MIN: CPT | Performed by: INTERNAL MEDICINE

## 2021-02-25 NOTE — PROGRESS NOTES
Confirmed patient was recently seen and this was an error on medication directions.    Patient seen and examined at the bedside.  Resting in bed feels better still complains about the pain which is underneath her left breast along the edge of the ribs.  Tells me in detail about that she had a fall and hit her side beginnings a .  Denies any fever or chills.  No chest pain or palpitation.  No shortness of breath.  We will continue pain management and we will observe and most likely patient will be discharged home tomorrow if stable.

## 2021-07-13 NOTE — PROGRESS NOTES
[facility]  Office Progress Note  Yanet Grove  1940  [address] [place] [address]       Visit Date: 07/14/21    PCP: Darin Baugh MD  [address]  [facility] [address]    IDENTIFICATION: A 80 y.o. female  2018 from [facility].    PROBLEM LIST:   1. chest pain syndrome  1. LHC 2006, non-obstructive disease, LV 60% per [facility].  2. Echo 2006 wnl with mild aortic sclerosis, mild mr, mild tr  2. HLD  1. 5/12: , TG 98, HDL 52,   2. 5/8/21 139/148/57/152  3. Glaucoma  1. followed per [facility].  4. GERD   1. with positive h.pylori per Dr. Ivan 2010  5. ckd 3- 1.4 [facility]  6. Polymyalgia rheumatica  7. Infection with continued abx as of 7/17- Filippo ID  8. Surgical Hx:  1. Knee replacement  2. Elbow fracture s/p ORIF 2016, [facility]       CC:   Chief Complaint   Patient presents with   • Hypertension   • Coronary Artery Disease       Allergies  Allergies   Allergen Reactions   • Phenergan [Promethazine Hcl] Hives       Current Medications    Current Outpatient Medications:   •  aspirin 81 MG EC tablet, Take 81 mg by mouth Daily., Disp: , Rfl:   •  cholecalciferol (VITAMIN D3) 1000 UNITS tablet, Take 2,000 Units by mouth daily., Disp: , Rfl:   •  ferrous sulfate 325 (65 FE) MG tablet, Take 325 mg by mouth daily with breakfast., Disp: , Rfl:   •  gabapentin (NEURONTIN) 100 MG capsule, , Disp: , Rfl:   •  LORazepam (ATIVAN) 0.5 MG tablet, , Disp: , Rfl:   •  Mirabegron ER (MYRBETRIQ) 50 MG tablet sustained-release 24 hour 24 hr tablet, Take 50 mg by mouth Daily., Disp: , Rfl:   •  Raloxifene HCl (EVISTA PO), Take 60 mg by mouth daily., Disp: , Rfl:   •  senna (senna) 8.6 MG tablet, Take 1 tablet by mouth Daily., Disp: , Rfl:   •  rosuvastatin (CRESTOR) 10 MG tablet, Take 1 tablet by mouth Every Night., Disp: 90 tablet, Rfl: 3  No current facility-administered medications for this visit.    Facility-Administered Medications Ordered  "in Other Visits:   •  bupivacaine PF (MARCAINE) 0.25 % injection, , , PRN, Sean Brown, CRNA, 50 mg at 09/13/16 6399      History of Present Illness   Yanet Grove is a 80 y.o. year old female here for follow up. Today the patient states that she is doing well form a cardiac standpoint. he denies any chest pain, dyspnea, dyspnea on exertion, orthopnea, PND, palpitations, lower extremity edema, or claudication.    Per chart report she has had elevated lipids without a history of Statin medication administration.       OBJECTIVE:  Vitals:    07/14/21 1027   BP: 110/52   Pulse: 78   SpO2: 97%   Weight: 69.4 kg (153 lb)   Height: 162.6 cm (64.02\")     Body mass index is 26.25 kg/m².    Constitutional:       General: Awake.      Appearance: Healthy appearance. Well-developed, overweight and not in distress.   Neck:      Thyroid: No thyromegaly.      Vascular: No carotid bruit.      Trachea: No tracheal deviation.   Pulmonary:      Effort: Pulmonary effort is normal. No respiratory distress.      Breath sounds: Normal breath sounds. No wheezing. No rales.   Cardiovascular:      Normal rate. Regular rhythm. Normal S1. Normal S2.      Murmurs: There is no murmur.      No gallop. No click. No rub.   Pulses:     Intact distal pulses.   Edema:     Peripheral edema absent.   Abdominal:      General: Bowel sounds are normal.      Palpations: Abdomen is soft. There is no abdominal mass.      Tenderness: There is no abdominal tenderness. There is no guarding.   Musculoskeletal:      Cervical back: Normal range of motion and neck supple. Skin:     General: Skin is warm and dry.   Neurological:      Mental Status: Alert and oriented to person, place, and time.   Psychiatric:         Attention and Perception: Attention normal.         Mood and Affect: Mood normal.         Speech: Speech normal.         Behavior: Behavior normal.         Diagnostic Data:    ECG 12 Lead    Date/Time: 7/14/2021 11:34 AM  Performed by: Santana" BO Estrada  Authorized by: Sean Dillon PA-C   Comparison: not compared with previous ECG   Rhythm: sinus rhythm  Rate: normal  BPM: 66  Conduction: conduction normal  T inversion: aVR and III  QRS axis: left  Other: no other findings    Clinical impression: normal ECG  Comments: Biphasic P wave noted in V1              ASSESSMENT:   Diagnosis Plan   1. Essential hypertension  ECG 12 Lead   2. Dyslipidemia  ECG 12 Lead       PLAN:  1. Blood pressure in office is within acceptable range. The patient denies any pre/syncopal episodes.  2. Hyperlipidemia - patient is statin naieve. Prescribe rosuvastatin 10 mg PO QD.  3. Patient told to follow up with PCP for Lipid level evaluation.      Scribe: Sean Lee MD, FACC

## 2021-07-14 ENCOUNTER — OFFICE VISIT (OUTPATIENT)
Dept: CARDIOLOGY | Facility: CLINIC | Age: 81
End: 2021-07-14

## 2021-07-14 VITALS
HEIGHT: 64 IN | SYSTOLIC BLOOD PRESSURE: 110 MMHG | HEART RATE: 78 BPM | OXYGEN SATURATION: 97 % | WEIGHT: 153 LBS | BODY MASS INDEX: 26.12 KG/M2 | DIASTOLIC BLOOD PRESSURE: 52 MMHG

## 2021-07-14 DIAGNOSIS — I10 ESSENTIAL HYPERTENSION: Primary | ICD-10-CM

## 2021-07-14 DIAGNOSIS — E78.5 DYSLIPIDEMIA: ICD-10-CM

## 2021-07-14 PROCEDURE — 99214 OFFICE O/P EST MOD 30 MIN: CPT | Performed by: INTERNAL MEDICINE

## 2021-07-14 PROCEDURE — 93000 ELECTROCARDIOGRAM COMPLETE: CPT

## 2021-07-14 RX ORDER — ROSUVASTATIN CALCIUM 10 MG/1
10 TABLET, COATED ORAL NIGHTLY
Qty: 90 TABLET | Refills: 3 | Status: SHIPPED | OUTPATIENT
Start: 2021-07-14 | End: 2022-07-08

## 2021-07-14 RX ORDER — GABAPENTIN 100 MG/1
CAPSULE ORAL
COMMUNITY
Start: 2021-07-11 | End: 2022-07-21 | Stop reason: ALTCHOICE

## 2021-07-14 RX ORDER — LORAZEPAM 0.5 MG/1
TABLET ORAL
COMMUNITY
Start: 2021-06-26 | End: 2022-07-21 | Stop reason: ALTCHOICE

## 2021-07-14 RX ORDER — SENNA PLUS 8.6 MG/1
1 TABLET ORAL DAILY
COMMUNITY

## 2021-09-13 DIAGNOSIS — E78.5 DYSLIPIDEMIA: Primary | ICD-10-CM

## 2022-01-18 ENCOUNTER — TRANSCRIBE ORDERS (OUTPATIENT)
Dept: ADMINISTRATIVE | Facility: HOSPITAL | Age: 82
End: 2022-01-18

## 2022-01-18 DIAGNOSIS — Z01.818 PRE-OP TESTING: Primary | ICD-10-CM

## 2022-01-23 ENCOUNTER — LAB (OUTPATIENT)
Dept: PREADMISSION TESTING | Facility: HOSPITAL | Age: 82
End: 2022-01-23

## 2022-01-23 DIAGNOSIS — Z01.818 PRE-OP TESTING: ICD-10-CM

## 2022-01-23 LAB — SARS-COV-2 RNA PNL SPEC NAA+PROBE: NOT DETECTED

## 2022-01-23 PROCEDURE — U0004 COV-19 TEST NON-CDC HGH THRU: HCPCS | Performed by: ORTHOPAEDIC SURGERY

## 2022-07-08 RX ORDER — ROSUVASTATIN CALCIUM 10 MG/1
TABLET, COATED ORAL
Qty: 90 TABLET | Refills: 1 | Status: SHIPPED | OUTPATIENT
Start: 2022-07-08 | End: 2023-01-10 | Stop reason: SDUPTHER

## 2022-07-19 NOTE — PROGRESS NOTES
[facility]  Office Progress Note  Yanet Grove  1940  [address] [place] [address]       Visit Date: 07/21/22    PCP: Darin Baugh MD  [address]  [place] [address]    IDENTIFICATION: A 82 y.o. female  2018 from [facility].    PROBLEM LIST:   1. chest pain syndrome  1. LHC 2006, non-obstructive disease, LV 60% per [facility]ukav[facility].  2. Echo 2006 wnl with mild aortic sclerosis, mild mr, mild tr  2. HLD  1. 5/12: , TG 98, HDL 52,   2. 5/8/21 139/148/57/152  3. 9/21 150/155/51/72  3. Glaucoma  1. followed per [facility].  4. GERD   1. with positive h.pylori per Dr. Ivan 2010  5. ckd 3- 1.4 [facility]  6. Polymyalgia rheumatica  7. Infection with continued abx as of 7/17- Filippo ID  8. Surgical Hx:  1. Knee replacement  2. Elbow fracture s/p ORIF 2016, [facility]       CC:   Chief Complaint   Patient presents with   • Hypertension       Allergies  Allergies   Allergen Reactions   • Phenergan [Promethazine Hcl] Hives       Current Medications    Current Outpatient Medications:   •  aspirin 81 MG EC tablet, Take 81 mg by mouth Daily., Disp: , Rfl:   •  cholecalciferol (VITAMIN D3) 1000 UNITS tablet, Take 2,000 Units by mouth daily., Disp: , Rfl:   •  ferrous sulfate 325 (65 FE) MG tablet, Take 325 mg by mouth daily with breakfast., Disp: , Rfl:   •  Mirabegron ER (MYRBETRIQ) 50 MG tablet sustained-release 24 hour 24 hr tablet, Take 50 mg by mouth Daily., Disp: , Rfl:   •  Raloxifene HCl (EVISTA PO), Take 60 mg by mouth daily., Disp: , Rfl:   •  rosuvastatin (CRESTOR) 10 MG tablet, TAKE ONE TABLET BY MOUTH ONCE NIGHTLY, Disp: 90 tablet, Rfl: 1  •  senna (SENOKOT) 8.6 MG tablet, Take 1 tablet by mouth Daily., Disp: , Rfl:   No current facility-administered medications for this visit.    Facility-Administered Medications Ordered in Other Visits:   •  bupivacaine PF (MARCAINE) 0.25 % injection, , , PRN, Sean Brown, CRNA, 50 mg at  "09/13/16 0759      History of Present Illness   Yanet Grove is a 82 y.o. year old female here for follow up.    Pt denies any chest pain, dyspnea, dyspnea on exertion, orthopnea, PND, palpitations, lower extremity edema, or claudication.  She states that she is lost interest in doing many activities.  She continues to interact with her son that lives locally    OBJECTIVE:  Vitals:    07/21/22 1036   BP: 122/46   BP Location: Right arm   Patient Position: Sitting   Pulse: 77   SpO2: 97%   Weight: 67.1 kg (148 lb)   Height: 162.6 cm (64\")     Body mass index is 25.4 kg/m².    Constitutional:       Appearance: Healthy appearance. Not in distress.   Neck:      Vascular: No JVR. JVD normal.   Pulmonary:      Effort: Pulmonary effort is normal.      Breath sounds: Normal breath sounds. No wheezing. No rhonchi. No rales.   Chest:      Chest wall: Not tender to palpatation.   Cardiovascular:      PMI at left midclavicular line. Normal rate. Regular rhythm. Normal S1. Normal S2.      Murmurs: There is no murmur.      No gallop. No click. No rub.   Pulses:     Intact distal pulses.   Edema:     Peripheral edema absent.   Abdominal:      General: Bowel sounds are normal.      Palpations: Abdomen is soft.      Tenderness: There is no abdominal tenderness.   Musculoskeletal: Normal range of motion.         General: No tenderness. Skin:     General: Skin is warm and dry.   Neurological:      General: No focal deficit present.      Mental Status: Alert and oriented to person, place and time.         Diagnostic Data:    ECG 12 Lead    Date/Time: 7/21/2022 11:05 AM  Performed by: Darin Lee MD  Authorized by: Darin Lee MD   Previous ECG: no previous ECG available  Rhythm: sinus rhythm  BPM: 65    Clinical impression: non-specific ECG              ASSESSMENT:   Diagnosis Plan   1. Coronary artery disease involving native coronary artery of native heart without angina pectoris     2. Mixed hyperlipidemia   "       PLAN:  CAD nonobstructive asymptomatic continued medical therapy    Mixed dyslipidemia controlled on statin therapy            Darin Lee MD, FACC

## 2022-07-21 ENCOUNTER — OFFICE VISIT (OUTPATIENT)
Dept: CARDIOLOGY | Facility: CLINIC | Age: 82
End: 2022-07-21

## 2022-07-21 VITALS
OXYGEN SATURATION: 97 % | DIASTOLIC BLOOD PRESSURE: 46 MMHG | HEART RATE: 77 BPM | HEIGHT: 64 IN | WEIGHT: 148 LBS | SYSTOLIC BLOOD PRESSURE: 122 MMHG | BODY MASS INDEX: 25.27 KG/M2

## 2022-07-21 DIAGNOSIS — E78.2 MIXED HYPERLIPIDEMIA: ICD-10-CM

## 2022-07-21 DIAGNOSIS — I25.10 CORONARY ARTERY DISEASE INVOLVING NATIVE CORONARY ARTERY OF NATIVE HEART WITHOUT ANGINA PECTORIS: Primary | ICD-10-CM

## 2022-07-21 PROCEDURE — 99213 OFFICE O/P EST LOW 20 MIN: CPT | Performed by: INTERNAL MEDICINE

## 2022-07-21 PROCEDURE — 93000 ELECTROCARDIOGRAM COMPLETE: CPT | Performed by: INTERNAL MEDICINE

## 2022-10-12 ENCOUNTER — OFFICE VISIT (OUTPATIENT)
Dept: FAMILY MEDICINE CLINIC | Facility: CLINIC | Age: 82
End: 2022-10-12

## 2022-10-12 VITALS
SYSTOLIC BLOOD PRESSURE: 130 MMHG | OXYGEN SATURATION: 98 % | HEART RATE: 72 BPM | DIASTOLIC BLOOD PRESSURE: 64 MMHG | BODY MASS INDEX: 25.95 KG/M2 | HEIGHT: 64 IN | WEIGHT: 152 LBS

## 2022-10-12 DIAGNOSIS — K21.00 GASTROESOPHAGEAL REFLUX DISEASE WITH ESOPHAGITIS WITHOUT HEMORRHAGE: ICD-10-CM

## 2022-10-12 DIAGNOSIS — I10 PRIMARY HYPERTENSION: Primary | ICD-10-CM

## 2022-10-12 DIAGNOSIS — N18.30 STAGE 3 CHRONIC KIDNEY DISEASE, UNSPECIFIED WHETHER STAGE 3A OR 3B CKD: ICD-10-CM

## 2022-10-12 DIAGNOSIS — E78.5 DYSLIPIDEMIA: ICD-10-CM

## 2022-10-12 DIAGNOSIS — F51.01 PRIMARY INSOMNIA: ICD-10-CM

## 2022-10-12 PROCEDURE — 99204 OFFICE O/P NEW MOD 45 MIN: CPT | Performed by: FAMILY MEDICINE

## 2022-10-12 RX ORDER — GABAPENTIN 100 MG/1
CAPSULE ORAL
Qty: 60 CAPSULE | Refills: 2 | Status: SHIPPED | OUTPATIENT
Start: 2022-10-12

## 2022-10-12 RX ORDER — GABAPENTIN 100 MG/1
100 CAPSULE ORAL
COMMUNITY
End: 2022-10-12 | Stop reason: SDUPTHER

## 2022-10-12 RX ORDER — LORAZEPAM 0.5 MG/1
0.5 TABLET ORAL EVERY 8 HOURS PRN
Qty: 30 TABLET | Refills: 5 | Status: SHIPPED | OUTPATIENT
Start: 2022-10-12

## 2022-10-12 RX ORDER — LORAZEPAM 0.5 MG/1
0.5 TABLET ORAL EVERY 8 HOURS PRN
COMMUNITY
End: 2022-10-12 | Stop reason: SDUPTHER

## 2022-10-12 NOTE — PROGRESS NOTES
Office Note     Name: Yanet Grove    : 1940     MRN: [id number]     Chief Complaint  Med Refill    Subjective     History of Present Illness:  Yanet Grove is a 82 y.o. female who presents today for refill on her meds. Takes 1 gabapentin and 1 lorazepam, if she does not sleep she take the benadryl without taking fabi 0.5 lorazepam. Has a urologist, Dr. Lee, and I kidney specialist. She had her b/w 2022.     Review of Systems:   Review of Systems    Past Medical History:   Past Medical History:   Diagnosis Date   • Acquired hypothyroidism    • CKD (chronic kidney disease) stage 3, GFR 30-59 ml/min ([facility])    • Coronary arteriosclerosis    • Esophageal stricture    • Fibroids    • Fracture, patella     2 SURGERIES   • GERD (gastroesophageal reflux disease)    • GERD (gastroesophageal reflux disease)    • Glaucoma    • Heterotopic ossification of bone 2016    Right Elbow   • High risk medication use     DRUG THERAPY FINDING   • History of gastric ulcer    • HLD (hyperlipidemia)    • HTN (hypertension)    • Insomnia    • Internal hemorrhoids    • Megaloblastic anemia    • MRSA (methicillin resistant Staphylococcus aureus)    • Osteoarthritis, shoulder    • Osteopenia    • Osteoporosis    • Polymyalgia rheumatica (HCC)    • Pseudomonas infection 2014    Right foot wound   • Renal failure syndrome    • Rotator cuff tear, left    • Urinary incontinence    • Vitamin D deficiency        Past Surgical History:   Past Surgical History:   Procedure Laterality Date   • CARPAL TUNNEL RELEASE Left    • CATARACT EXTRACTION Bilateral    • COLONOSCOPY     • ELBOW ARTHROTOMY Right 2017    Procedure: Right elbow radical resection capsule soft tissue heterotopic bone with contracture release;  Surgeon: Trung Bonilla MD;  Location: [facility];  Service:    • HYSTERECTOMY      TOTAL   • REPLACEMENT TOTAL KNEE     • SKIN GRAFT Right     Foot   • TOTAL ABDOMINAL HYSTERECTOMY WITH  SALPINGO OOPHORECTOMY  1982   • TOTAL ELBOW ARTHROPLASTY Right 09/12/2016    Procedure: TOTAL ELBOW ARTHROPLASTY; ULNAR NERVE TRANSPOSITION;  Surgeon: Trung Bonilla MD;  [facility];  Service:    • ULNAR NERVE TRANSPOSITION Right 2016   • UPPER GASTROINTESTINAL ENDOSCOPY  2013       Family History:   Family History   Problem Relation Age of Onset   • Heart failure Mother    • Stroke Mother 70   • Lung cancer Father 63       Social History:   Social History     Socioeconomic History   • Marital status:    Tobacco Use   • Smoking status: Never   • Smokeless tobacco: Never   Vaping Use   • Vaping Use: Never used   Substance and Sexual Activity   • Alcohol use: No   • Drug use: No   • Sexual activity: Yes     Partners: Male     Comment:  x 58 years       Immunizations:   Immunization History   Administered Date(s) Administered   • COVID-19 (MODERNA) 1st, 2nd, 3rd Dose Only 03/02/2021, 03/30/2021, 11/17/2021   • Pneumococcal Conjugate 13-Valent (PCV13) 04/10/2008, 04/26/2017   • Td 06/03/2004, 10/30/2015        Medications:     Current Outpatient Medications:   •  aspirin 81 MG EC tablet, Take 81 mg by mouth Daily., Disp: , Rfl:   •  cholecalciferol (VITAMIN D3) 1000 UNITS tablet, Take 2,000 Units by mouth daily., Disp: , Rfl:   •  ferrous sulfate 325 (65 FE) MG tablet, Take 325 mg by mouth daily with breakfast., Disp: , Rfl:   •  gabapentin (NEURONTIN) 100 MG capsule, Take one to two capsules by mouth every night at bedtime for insomnia., Disp: 60 capsule, Rfl: 2  •  LORazepam (ATIVAN) 0.5 MG tablet, Take 1 tablet by mouth Every 8 (Eight) Hours As Needed for Anxiety., Disp: 30 tablet, Rfl: 5  •  Mirabegron ER (MYRBETRIQ) 50 MG tablet sustained-release 24 hour 24 hr tablet, Take 50 mg by mouth Daily., Disp: , Rfl:   •  Raloxifene HCl (EVISTA PO), Take 60 mg by mouth daily., Disp: , Rfl:   •  rosuvastatin (CRESTOR) 10 MG tablet, TAKE ONE TABLET BY MOUTH ONCE NIGHTLY, Disp: 90 tablet, Rfl: 1  •  " senna (SENOKOT) 8.6 MG tablet, Take 1 tablet by mouth Daily., Disp: , Rfl:   No current facility-administered medications for this visit.    Facility-Administered Medications Ordered in Other Visits:   •  bupivacaine PF (MARCAINE) 0.25 % injection, , , PRN, Sean Brown CRNA, 50 mg at 09/13/16 0759    Allergies:   Allergies   Allergen Reactions   • Phenergan [Promethazine Hcl] Hives       Objective     Vital Signs  /64 (BP Location: Left arm, Patient Position: Sitting)   Pulse 72   Ht 162.6 cm (64\")   Wt 68.9 kg (152 lb)   SpO2 98%   BMI 26.09 kg/m²   Estimated body mass index is 26.09 kg/m² as calculated from the following:    Height as of this encounter: 162.6 cm (64\").    Weight as of this encounter: 68.9 kg (152 lb).          Physical Exam  Vitals and nursing note reviewed.   Constitutional:       Appearance: Normal appearance.   HENT:      Head: Normocephalic.   Neck:      Vascular: No carotid bruit.   Cardiovascular:      Rate and Rhythm: Normal rate and regular rhythm.      Pulses: Normal pulses.      Heart sounds: Normal heart sounds.   Pulmonary:      Effort: Pulmonary effort is normal.      Breath sounds: Normal breath sounds.   Abdominal:      General: Bowel sounds are normal.      Palpations: Abdomen is soft.   Musculoskeletal:      Cervical back: Normal range of motion and neck supple.   Skin:     General: Skin is warm and dry.   Neurological:      Mental Status: She is alert.   Psychiatric:         Mood and Affect: Mood normal.          Procedures     Assessment and Plan     1. Dyslipidemia  Stick to a low-fat low-carb    2. Primary hypertension  And meds as directed      3. Gastroesophageal reflux disease with esophagitis without hemorrhage  Continue all meds    4. Stage 3 chronic kidney disease, unspecified whether stage 3a or 3b CKD (HCC)  Continue all meds    5. Primary insomnia  She takes appropriately continue all meds.  We will have the patient come back in 6 months and at that " time will do a T4 CBC CMP and B12, and vitamin D level.  - gabapentin (NEURONTIN) 100 MG capsule; Take one to two capsules by mouth every night at bedtime for insomnia.  Dispense: 60 capsule; Refill: 2  - LORazepam (ATIVAN) 0.5 MG tablet; Take 1 tablet by mouth Every 8 (Eight) Hours As Needed for Anxiety.  Dispense: 30 tablet; Refill: 5       Follow Up  Return in about 6 months (around 4/12/2023), or if symptoms worsen or fail to improve.    Darin JASON PC [facility]  [address] [address] [phone]

## 2022-11-05 ENCOUNTER — OFFICE VISIT (OUTPATIENT)
Dept: FAMILY MEDICINE CLINIC | Facility: CLINIC | Age: 82
End: 2022-11-05

## 2022-11-05 VITALS
HEART RATE: 86 BPM | SYSTOLIC BLOOD PRESSURE: 138 MMHG | WEIGHT: 150 LBS | DIASTOLIC BLOOD PRESSURE: 64 MMHG | HEIGHT: 64 IN | TEMPERATURE: 98.4 F | BODY MASS INDEX: 25.61 KG/M2 | OXYGEN SATURATION: 97 %

## 2022-11-05 DIAGNOSIS — R05.9 COUGH IN ADULT: ICD-10-CM

## 2022-11-05 DIAGNOSIS — J06.9 VIRAL UPPER RESPIRATORY TRACT INFECTION: Primary | ICD-10-CM

## 2022-11-05 LAB
EXPIRATION DATE: NORMAL
FLUAV AG UPPER RESP QL IA.RAPID: NOT DETECTED
FLUBV AG UPPER RESP QL IA.RAPID: NOT DETECTED
INTERNAL CONTROL: NORMAL
Lab: NORMAL
SARS-COV-2 RNA RESP QL NAA+PROBE: NOT DETECTED

## 2022-11-05 PROCEDURE — 87428 SARSCOV & INF VIR A&B AG IA: CPT | Performed by: PHYSICIAN ASSISTANT

## 2022-11-05 PROCEDURE — 99213 OFFICE O/P EST LOW 20 MIN: CPT | Performed by: PHYSICIAN ASSISTANT

## 2022-11-05 RX ORDER — BENZONATATE 100 MG/1
100 CAPSULE ORAL 3 TIMES DAILY PRN
Qty: 45 CAPSULE | Refills: 0 | Status: SHIPPED | OUTPATIENT
Start: 2022-11-05

## 2022-11-05 NOTE — PROGRESS NOTES
"Chief Complaint  Nasal Congestion (cough)    Subjective        Yanet Grove presents to [facility]  History of Present Illness  Patient presents to the clinic with her son.  She states that starting 5 days ago she felt chilled and then had some chills again the next day.  She then started having more runny nose and congestion but not much.  She tried to get into see us on Wednesday but could not get in.  She states that she is now started cough she states the cough is not keeping her up at night because she is been using her hydrocodone cough syrup.  She states that she has had a cough in the past and the hydrocodone polo medicine that helps her.  She states that she has tried nebulizers and albuterol in the past but they do not work for her either.  She denies any fever or discolored drainage.    She states that she has been seeing double on and off for the past few months.  She states she first noticed it when she was watching the preacher on Sundays.  She then noticed that the other day while she was driving she saw double and wound up running into her garage door at her home.    Objective   Vital Signs:  /64   Pulse 86   Temp 98.4 °F (36.9 °C)   Ht 162.6 cm (64\")   Wt 68 kg (150 lb)   SpO2 97%   BMI 25.75 kg/m²   Estimated body mass index is 25.75 kg/m² as calculated from the following:    Height as of this encounter: 162.6 cm (64\").    Weight as of this encounter: 68 kg (150 lb).          Physical Exam  Vitals reviewed.   Constitutional:       Appearance: Normal appearance. She is normal weight.   HENT:      Head: Normocephalic and atraumatic.      Right Ear: Tympanic membrane, ear canal and external ear normal.      Left Ear: Tympanic membrane, ear canal and external ear normal.      Nose: Congestion present.      Mouth/Throat:      Mouth: Mucous membranes are moist.      Comments: Clear post nasal drainge  Eyes:      Pupils: Pupils are equal, round, and " reactive to light.   Cardiovascular:      Rate and Rhythm: Normal rate and regular rhythm.      Heart sounds: Normal heart sounds.   Pulmonary:      Effort: Pulmonary effort is normal.      Breath sounds: Normal breath sounds.   Skin:     General: Skin is warm.   Neurological:      General: No focal deficit present.      Mental Status: She is alert.   Psychiatric:         Mood and Affect: Mood normal.        Result Review :     Covid-19 + Flu A&B AG, Veritor (11/05/2022 10:31)              Assessment and Plan   Diagnoses and all orders for this visit:    1. Viral upper respiratory tract infection (Primary)  Discussed with patient that her COVID and flu test were negative today.  I am unable to get her a refill on her hydrocodone cough syrup as I do not have a [place] license.  She agreed to try Tessalon Perles and if they do not help her cough she will give us a call.  If she develops a fever or worsening cough she is to call our office.  2. Cough in adult  -     benzonatate (Tessalon Perles) 100 MG capsule; Take 1 capsule by mouth 3 (Three) Times a Day As Needed for Cough.  Dispense: 45 capsule; Refill: 0  -     Covid-19 + Flu A&B AG, Veritor             Follow Up   No follow-ups on file.  Patient was given instructions and counseling regarding her condition or for health maintenance advice. Please see specific information pulled into the AVS if appropriate.

## 2022-11-07 ENCOUNTER — TELEPHONE (OUTPATIENT)
Dept: FAMILY MEDICINE CLINIC | Facility: CLINIC | Age: 82
End: 2022-11-07

## 2022-11-07 DIAGNOSIS — J06.9 VIRAL UPPER RESPIRATORY TRACT INFECTION: Primary | ICD-10-CM

## 2022-11-07 NOTE — TELEPHONE ENCOUNTER
Pt called again stating that she was up all night last night coughing. She wants Dr Baugh to rx the cough medicine that is mentioned in the previous message. Pt saw Sienna Herrera last Saturday and because the cough medicine is controlled, Sienna could not rx it. Pt tried the Tesslon Pearls but it has not helped. Sienna told pt to call us if they do not work. Pt is begging for you to rx HYDROCODONE - CHLORPH ER 10-8MG

## 2023-01-10 ENCOUNTER — TELEPHONE (OUTPATIENT)
Dept: CARDIOLOGY | Facility: CLINIC | Age: 83
End: 2023-01-10
Payer: MEDICARE

## 2023-01-10 RX ORDER — ROSUVASTATIN CALCIUM 10 MG/1
10 TABLET, COATED ORAL NIGHTLY
Qty: 90 TABLET | Refills: 3 | Status: SHIPPED | OUTPATIENT
Start: 2023-01-10

## 2023-04-12 ENCOUNTER — OFFICE VISIT (OUTPATIENT)
Dept: FAMILY MEDICINE CLINIC | Facility: CLINIC | Age: 83
End: 2023-04-12
Payer: MEDICARE

## 2023-04-12 VITALS
BODY MASS INDEX: 24.77 KG/M2 | WEIGHT: 145.1 LBS | SYSTOLIC BLOOD PRESSURE: 122 MMHG | DIASTOLIC BLOOD PRESSURE: 82 MMHG | HEART RATE: 84 BPM | HEIGHT: 64 IN | OXYGEN SATURATION: 98 %

## 2023-04-12 DIAGNOSIS — M19.90 ARTHRITIS: ICD-10-CM

## 2023-04-12 DIAGNOSIS — R53.83 OTHER FATIGUE: ICD-10-CM

## 2023-04-12 DIAGNOSIS — I10 PRIMARY HYPERTENSION: ICD-10-CM

## 2023-04-12 DIAGNOSIS — M81.0 AGE-RELATED OSTEOPOROSIS WITHOUT CURRENT PATHOLOGICAL FRACTURE: ICD-10-CM

## 2023-04-12 DIAGNOSIS — E03.9 ACQUIRED HYPOTHYROIDISM: ICD-10-CM

## 2023-04-12 DIAGNOSIS — N18.30 STAGE 3 CHRONIC KIDNEY DISEASE, UNSPECIFIED WHETHER STAGE 3A OR 3B CKD: ICD-10-CM

## 2023-04-12 DIAGNOSIS — D53.1 MEGALOBLASTIC ANEMIA: ICD-10-CM

## 2023-04-12 DIAGNOSIS — E78.5 DYSLIPIDEMIA: Primary | ICD-10-CM

## 2023-04-12 DIAGNOSIS — E55.9 VITAMIN D DEFICIENCY: ICD-10-CM

## 2023-04-12 DIAGNOSIS — M35.3 POLYMYALGIA RHEUMATICA: ICD-10-CM

## 2023-04-12 DIAGNOSIS — K21.00 GASTROESOPHAGEAL REFLUX DISEASE WITH ESOPHAGITIS WITHOUT HEMORRHAGE: ICD-10-CM

## 2023-04-12 NOTE — PROGRESS NOTES
Office Note     Name: Yanet Grove    : 1940     MRN: [id number]     Chief Complaint  Hypertension (Pt comes in today follow up Htn )    Subjective     History of Present Illness:  Yanet Grove is a 82 y.o. female who presents today for she put the hypertension but has not had any steroids for a long time and has no hypertensive meds.  Is she is on Crestor for dyslipidemia, she is got CKD nephrologist at [place] [facility] [place].  Has 1 right hand carpal metacarpal bone tenderness.  She has    Review of Systems:   Review of Systems    Past Medical History:   Past Medical History:   Diagnosis Date   • Acquired hypothyroidism    • CKD (chronic kidney disease) stage 3, GFR 30-59 ml/min    • Coronary arteriosclerosis    • Esophageal stricture    • Fibroids    • Fracture, patella     2 SURGERIES   • GERD (gastroesophageal reflux disease)    • GERD (gastroesophageal reflux disease)    • Glaucoma    • Heterotopic ossification of bone     Right Elbow   • High risk medication use     DRUG THERAPY FINDING   • History of gastric ulcer    • HLD (hyperlipidemia)    • HTN (hypertension)    • Insomnia    • Internal hemorrhoids    • Megaloblastic anemia    • MRSA (methicillin resistant Staphylococcus aureus)    • Osteoarthritis, shoulder    • Osteopenia    • Osteoporosis    • Polymyalgia rheumatica    • Pseudomonas infection     Right foot wound   • Renal failure syndrome    • Rotator cuff tear, left    • Urinary incontinence    • Vitamin D deficiency        Past Surgical History:   Past Surgical History:   Procedure Laterality Date   • CARPAL TUNNEL RELEASE Left    • CATARACT EXTRACTION Bilateral    • COLONOSCOPY     • ELBOW ARTHROTOMY Right 2017    Procedure: Right elbow radical resection capsule soft tissue heterotopic bone with contracture release;  Surgeon: Trung Bonilla MD;  Location: [facility];  Service:    • HYSTERECTOMY      TOTAL   • REPLACEMENT TOTAL KNEE     • SKIN GRAFT  Right 2014    Foot   • TOTAL ABDOMINAL HYSTERECTOMY WITH SALPINGO OOPHORECTOMY  1982   • TOTAL ELBOW ARTHROPLASTY Right 09/12/2016    Procedure: TOTAL ELBOW ARTHROPLASTY; ULNAR NERVE TRANSPOSITION;  Surgeon: Trung Bonilla MD;  Location: [facility] OR;  Service:    • ULNAR NERVE TRANSPOSITION Right 2016   • UPPER GASTROINTESTINAL ENDOSCOPY  2013       Family History:   Family History   Problem Relation Age of Onset   • Heart failure Mother    • Stroke Mother 70   • Lung cancer Father 63       Social History:   Social History     Socioeconomic History   • Marital status:    Tobacco Use   • Smoking status: Never   • Smokeless tobacco: Never   Vaping Use   • Vaping Use: Never used   Substance and Sexual Activity   • Alcohol use: No   • Drug use: No   • Sexual activity: Yes     Partners: Male     Comment:  x 58 years       Immunizations:   Immunization History   Administered Date(s) Administered   • COVID-19 (MODERNA) 1st, 2nd, 3rd Dose Only 03/02/2021, 03/30/2021, 11/17/2021   • Pneumococcal Conjugate 13-Valent (PCV13) 04/10/2008, 04/26/2017   • Td 06/03/2004, 10/30/2015        Medications:     Current Outpatient Medications:   •  aspirin 81 MG EC tablet, Take 1 tablet by mouth Daily., Disp: , Rfl:   •  cholecalciferol (VITAMIN D3) 1000 UNITS tablet, Take 2 tablets by mouth Daily., Disp: , Rfl:   •  ferrous sulfate 325 (65 FE) MG tablet, Take 1 tablet by mouth Daily With Breakfast., Disp: , Rfl:   •  Mirabegron ER (MYRBETRIQ) 50 MG tablet sustained-release 24 hour 24 hr tablet, Take 50 mg by mouth Daily., Disp: , Rfl:   •  Raloxifene HCl (EVISTA PO), Take 60 mg by mouth daily., Disp: , Rfl:   •  rosuvastatin (CRESTOR) 10 MG tablet, Take 1 tablet by mouth Every Night., Disp: 90 tablet, Rfl: 3  •  senna (SENOKOT) 8.6 MG tablet, Take 1 tablet by mouth Daily., Disp: , Rfl:   No current facility-administered medications for this visit.    Facility-Administered Medications Ordered in Other Visits:   •   "bupivacaine PF (MARCAINE) 0.25 % injection, , , PRN, Sean Brown MOsvaldo, CRNA, 50 mg at 09/13/16 0759    Allergies:   Allergies   Allergen Reactions   • Phenergan [Promethazine Hcl] Hives       Objective     Vital Signs  /82 (BP Location: Left arm, Patient Position: Sitting, Cuff Size: Adult)   Pulse 84   Ht 162.6 cm (64\")   Wt 65.8 kg (145 lb 1.6 oz)   SpO2 98%   BMI 24.91 kg/m²   Estimated body mass index is 24.91 kg/m² as calculated from the following:    Height as of this encounter: 162.6 cm (64\").    Weight as of this encounter: 65.8 kg (145 lb 1.6 oz).    BMI is within normal parameters. No other follow-up for BMI required.      Physical Exam  Vitals and nursing note reviewed.   Constitutional:       Appearance: Normal appearance. She is normal weight.   HENT:      Head: Normocephalic and atraumatic.      Right Ear: Tympanic membrane, ear canal and external ear normal.      Left Ear: Tympanic membrane, ear canal and external ear normal.      Nose: No congestion or rhinorrhea.      Mouth/Throat:      Mouth: Mucous membranes are moist.   Eyes:      Extraocular Movements: Extraocular movements intact.      Conjunctiva/sclera: Conjunctivae normal.      Pupils: Pupils are equal, round, and reactive to light.   Cardiovascular:      Rate and Rhythm: Normal rate and regular rhythm.   Pulmonary:      Effort: Pulmonary effort is normal.      Breath sounds: Normal breath sounds.   Lymphadenopathy:      Cervical: No cervical adenopathy.   Skin:     General: Skin is warm and dry.   Neurological:      General: No focal deficit present.      Mental Status: She is alert.          Procedures     Assessment and Plan     1. Primary hypertension  As of no need for blood pressure pill today    2. Dyslipidemia  Crestor 10 mg nightly    3. Acquired hypothyroidism  In 3 months and getting a TSH    4. Gastroesophageal reflux disease with esophagitis without hemorrhage  Controlled    5. Stage 3 chronic kidney disease, " unspecified whether stage 3a or 3b CKD  Controlled    6. Polymyalgia rheumatica  Resolved    7. Arthritis  Complains of the right hand joint thumb joint carpometacarpal, knows not to take NSAIDs    8. Age-related osteoporosis without current pathological fracture  [facility]    9. Megaloblastic anemia  We will check in 3 months    10. Vitamin D deficiency  We will check in 3 months    11. Other fatigue  We will check in 3-month       Follow Up  Return in about 3 months (around 7/12/2023) for ---, Labs Only  cbc, cmp lipid profile, tsh , vit d , b12  and folate.    Darin Baugh MD  [facility]  [address] [facility] [address] [phone]

## 2023-06-02 ENCOUNTER — OFFICE VISIT (OUTPATIENT)
Dept: FAMILY MEDICINE CLINIC | Facility: CLINIC | Age: 83
End: 2023-06-02
Payer: MEDICARE

## 2023-06-02 VITALS
WEIGHT: 147.6 LBS | BODY MASS INDEX: 25.2 KG/M2 | HEART RATE: 71 BPM | HEIGHT: 64 IN | DIASTOLIC BLOOD PRESSURE: 68 MMHG | SYSTOLIC BLOOD PRESSURE: 120 MMHG | TEMPERATURE: 97.8 F

## 2023-06-02 DIAGNOSIS — N30.00 ACUTE CYSTITIS WITHOUT HEMATURIA: ICD-10-CM

## 2023-06-02 DIAGNOSIS — R35.0 URINARY FREQUENCY: Primary | ICD-10-CM

## 2023-06-02 LAB
BILIRUB BLD-MCNC: NEGATIVE MG/DL
CLARITY, POC: CLEAR
COLOR UR: YELLOW
EXPIRATION DATE: ABNORMAL
GLUCOSE UR STRIP-MCNC: NEGATIVE MG/DL
KETONES UR QL: NEGATIVE
LEUKOCYTE EST, POC: ABNORMAL
Lab: ABNORMAL
NITRITE UR-MCNC: NEGATIVE MG/ML
PH UR: 5.5 [PH] (ref 5–8)
PROT UR STRIP-MCNC: ABNORMAL MG/DL
RBC # UR STRIP: ABNORMAL /UL
SP GR UR: 1.01 (ref 1–1.03)
UROBILINOGEN UR QL: ABNORMAL

## 2023-06-02 PROCEDURE — 81003 URINALYSIS AUTO W/O SCOPE: CPT | Performed by: INTERNAL MEDICINE

## 2023-06-02 PROCEDURE — 1159F MED LIST DOCD IN RCRD: CPT | Performed by: INTERNAL MEDICINE

## 2023-06-02 PROCEDURE — 3074F SYST BP LT 130 MM HG: CPT | Performed by: INTERNAL MEDICINE

## 2023-06-02 PROCEDURE — 99213 OFFICE O/P EST LOW 20 MIN: CPT | Performed by: INTERNAL MEDICINE

## 2023-06-02 PROCEDURE — 3078F DIAST BP <80 MM HG: CPT | Performed by: INTERNAL MEDICINE

## 2023-06-02 PROCEDURE — 1160F RVW MEDS BY RX/DR IN RCRD: CPT | Performed by: INTERNAL MEDICINE

## 2023-06-02 RX ORDER — NITROFURANTOIN 25; 75 MG/1; MG/1
100 CAPSULE ORAL 2 TIMES DAILY
Qty: 14 CAPSULE | Refills: 0 | Status: SHIPPED | OUTPATIENT
Start: 2023-06-02 | End: 2023-06-09

## 2023-06-02 NOTE — PROGRESS NOTES
Office Note     Name: Yanet Grove    : 1940     MRN: [id number]     Chief Complaint  Urinary Tract Infection (Pt complains of urinary frequency. )    Subjective     History of Present Illness:  Yanet Grove is a 82 y.o. female who presents today for urinary frequency.    Started about 1 week ago.  No fevers, no vomiting or diarrhea, no dysuria or abdominal pain.    Review of Systems:   Review of Systems   Constitutional: Negative for fever.   Gastrointestinal: Negative for diarrhea and vomiting.       Past Medical History:   Past Medical History:   Diagnosis Date   • Acquired hypothyroidism    • CKD (chronic kidney disease) stage 3, GFR 30-59 ml/min    • Coronary arteriosclerosis    • Esophageal stricture    • Fibroids    • Fracture, patella     2 SURGERIES   • GERD (gastroesophageal reflux disease)    • GERD (gastroesophageal reflux disease)    • Glaucoma    • Heterotopic ossification of bone 2016    Right Elbow   • High risk medication use     DRUG THERAPY FINDING   • History of gastric ulcer    • HLD (hyperlipidemia)    • HTN (hypertension)    • Insomnia    • Internal hemorrhoids    • Megaloblastic anemia    • MRSA (methicillin resistant Staphylococcus aureus)    • Osteoarthritis, shoulder    • Osteopenia    • Osteoporosis    • Polymyalgia rheumatica    • Pseudomonas infection 2014    Right foot wound   • Renal failure syndrome    • Rotator cuff tear, left    • Urinary incontinence    • Vitamin D deficiency        Past Surgical History:   Past Surgical History:   Procedure Laterality Date   • CARPAL TUNNEL RELEASE Left    • CATARACT EXTRACTION Bilateral    • COLONOSCOPY     • ELBOW ARTHROTOMY Right 2017    Procedure: Right elbow radical resection capsule soft tissue heterotopic bone with contracture release;  Surgeon: Trung Bonilla MD;  Location: [facility];  Service:    • HYSTERECTOMY      TOTAL   • REPLACEMENT TOTAL KNEE     • SKIN GRAFT Right     Foot   • TOTAL  ABDOMINAL HYSTERECTOMY WITH SALPINGO OOPHORECTOMY  1982   • TOTAL ELBOW ARTHROPLASTY Right 09/12/2016    Procedure: TOTAL ELBOW ARTHROPLASTY; ULNAR NERVE TRANSPOSITION;  Surgeon: Trung Bonilla MD;  Location: [facility]placed by [facility];  Service:    • ULNAR NERVE TRANSPOSITION Right 2016   • UPPER GASTROINTESTINAL ENDOSCOPY  2013       Family History:   Family History   Problem Relation Age of Onset   • Heart failure Mother    • Stroke Mother 70   • Lung cancer Father 63       Social History:   Social History     Socioeconomic History   • Marital status:    Tobacco Use   • Smoking status: Never   • Smokeless tobacco: Never   Vaping Use   • Vaping Use: Never used   Substance and Sexual Activity   • Alcohol use: No   • Drug use: No   • Sexual activity: Yes     Partners: Male     Comment:  x 58 years       Immunizations:   Immunization History   Administered Date(s) Administered   • COVID-19 (MODERNA) 1st,2nd,3rd Dose Monovalent 03/02/2021, 03/30/2021, 11/17/2021   • Pneumococcal Conjugate 13-Valent (PCV13) 04/10/2008, 04/26/2017   • Td 06/03/2004, 10/30/2015        Medications:     Current Outpatient Medications:   •  aspirin 81 MG EC tablet, Take 1 tablet by mouth Daily., Disp: , Rfl:   •  cholecalciferol (VITAMIN D3) 1000 UNITS tablet, Take 2 tablets by mouth Daily., Disp: , Rfl:   •  ferrous sulfate 325 (65 FE) MG tablet, Take 1 tablet by mouth Daily With Breakfast., Disp: , Rfl:   •  Mirabegron ER (MYRBETRIQ) 50 MG tablet sustained-release 24 hour 24 hr tablet, Take 50 mg by mouth Daily., Disp: , Rfl:   •  Raloxifene HCl (EVISTA PO), Take 60 mg by mouth daily., Disp: , Rfl:   •  rosuvastatin (CRESTOR) 10 MG tablet, Take 1 tablet by mouth Every Night., Disp: 90 tablet, Rfl: 3  •  senna (SENOKOT) 8.6 MG tablet, Take 1 tablet by mouth Daily., Disp: , Rfl:   No current facility-administered medications for this visit.    Facility-Administered Medications Ordered in Other Visits:   •  bupivacaine PF (MARCAINE) 0.25  "% injection, , , PRN, Kevin Sean DUMONTOsvaldo, CRNA, 50 mg at 09/13/16 0759    Allergies:   Allergies   Allergen Reactions   • Phenergan [Promethazine Hcl] Hives       Objective     Vital Signs  /68   Pulse 71   Temp 97.8 °F (36.6 °C) (Oral)   Ht 162.6 cm (64\")   Wt 67 kg (147 lb 9.6 oz)   BMI 25.34 kg/m²   Estimated body mass index is 25.34 kg/m² as calculated from the following:    Height as of this encounter: 162.6 cm (64\").    Weight as of this encounter: 67 kg (147 lb 9.6 oz).          Physical Exam  Vitals and nursing note reviewed.   Constitutional:       General: She is not in acute distress.     Appearance: Normal appearance. She is not toxic-appearing.   Cardiovascular:      Rate and Rhythm: Normal rate and regular rhythm.      Heart sounds: Normal heart sounds.   Pulmonary:      Effort: Pulmonary effort is normal.      Breath sounds: Normal breath sounds.   Abdominal:      Palpations: Abdomen is soft.      Tenderness: There is no abdominal tenderness (No suprapubic tenderness). There is no right CVA tenderness or left CVA tenderness.   Neurological:      Mental Status: She is alert.            Procedures     Assessment and Plan     1. Urinary frequency  - POC Urinalysis Dipstick, Automated  - Urine Culture - Urine, Urine, Clean Catch  - nitrofurantoin, macrocrystal-monohydrate, (Macrobid) 100 MG capsule; Take 1 capsule by mouth 2 (Two) Times a Day for 7 days.  Dispense: 14 capsule; Refill: 0    2. Acute cystitis without hematuria  - POC Urinalysis Dipstick, Automated  - Urine Culture - Urine, Urine, Clean Catch  - nitrofurantoin, macrocrystal-monohydrate, (Macrobid) 100 MG capsule; Take 1 capsule by mouth 2 (Two) Times a Day for 7 days.  Dispense: 14 capsule; Refill: 0       Follow Up  Return if symptoms worsen or fail to improve.    Patient was given instructions and counseling regarding her condition or for health maintenance advice. Please see specific information pulled into the AVS if " appropriate.     Mi Kaba MD  [facility]  [address] [facility] [address] [phone]

## 2023-06-06 ENCOUNTER — OFFICE VISIT (OUTPATIENT)
Dept: FAMILY MEDICINE CLINIC | Facility: CLINIC | Age: 83
End: 2023-06-06
Payer: MEDICARE

## 2023-06-06 VITALS
RESPIRATION RATE: 17 BRPM | TEMPERATURE: 97.3 F | DIASTOLIC BLOOD PRESSURE: 62 MMHG | BODY MASS INDEX: 24.77 KG/M2 | SYSTOLIC BLOOD PRESSURE: 130 MMHG | HEIGHT: 64 IN | OXYGEN SATURATION: 95 % | WEIGHT: 145.1 LBS | HEART RATE: 90 BPM

## 2023-06-06 DIAGNOSIS — Z87.39 H/O POLYMYALGIA RHEUMATICA: ICD-10-CM

## 2023-06-06 DIAGNOSIS — N18.30 STAGE 3 CHRONIC KIDNEY DISEASE, UNSPECIFIED WHETHER STAGE 3A OR 3B CKD: ICD-10-CM

## 2023-06-06 DIAGNOSIS — R09.1 PLEURISY: Primary | ICD-10-CM

## 2023-06-06 DIAGNOSIS — E03.9 ACQUIRED HYPOTHYROIDISM: ICD-10-CM

## 2023-06-06 DIAGNOSIS — I10 PRIMARY HYPERTENSION: ICD-10-CM

## 2023-06-06 RX ORDER — HYDROCODONE BITARTRATE AND ACETAMINOPHEN 7.5; 325 MG/1; MG/1
TABLET ORAL
COMMUNITY
Start: 2023-03-03

## 2023-06-06 RX ORDER — PREDNISONE 10 MG/1
TABLET ORAL
Qty: 30 TABLET | Refills: 0 | Status: SHIPPED | OUTPATIENT
Start: 2023-06-06

## 2023-06-06 RX ORDER — TRETINOIN 0.5 MG/G
1 CREAM TOPICAL NIGHTLY
Qty: 20 G | Refills: 2 | Status: SHIPPED | OUTPATIENT
Start: 2023-06-06

## 2023-06-06 RX ORDER — BRIMONIDINE TARTRATE, TIMOLOL MALEATE 2; 5 MG/ML; MG/ML
SOLUTION/ DROPS OPHTHALMIC
COMMUNITY
Start: 2023-02-09

## 2023-06-06 NOTE — PROGRESS NOTES
Office Note     Name: Yanet Grove    : 1940     MRN: [id number]     Chief Complaint  Breathing Problem (pT STATES HURTS WHEN BREATHING.. SYMPTOMS SINCE ..)    Subjective     History of Present Illness:  Yanet Grove is a 82 y.o. female who presents today for increased frequency 5 days ago, put on Macrodantin 100 mg twice daily, 4 days ago she mentioned fever and chills, 2 days she is mention right side her chest hurting now left side of her chest hurting radiates to the back.   she had polymyalgia rheumatica treated by Dr. Reese.  She has had horrible arthritis in her wrist, and pleurisy 3 years ago    Review of Systems:   Review of Systems    Past Medical History:   Past Medical History:   Diagnosis Date    Acquired hypothyroidism     CKD (chronic kidney disease) stage 3, GFR 30-59 ml/min     Coronary arteriosclerosis     Esophageal stricture     Fibroids     Fracture, patella     2 SURGERIES    GERD (gastroesophageal reflux disease)     GERD (gastroesophageal reflux disease)     Glaucoma     Heterotopic ossification of bone 2016    Right Elbow    High risk medication use     DRUG THERAPY FINDING    History of gastric ulcer     HLD (hyperlipidemia)     HTN (hypertension)     Insomnia     Internal hemorrhoids     Megaloblastic anemia     MRSA (methicillin resistant Staphylococcus aureus)     Osteoarthritis, shoulder     Osteopenia     Osteoporosis     Polymyalgia rheumatica     Pseudomonas infection     Right foot wound    Renal failure syndrome     Rotator cuff tear, left     Urinary incontinence     Vitamin D deficiency        Past Surgical History:   Past Surgical History:   Procedure Laterality Date    CARPAL TUNNEL RELEASE Left     CATARACT EXTRACTION Bilateral     COLONOSCOPY  2011    ELBOW ARTHROTOMY Right 2017    Procedure: Right elbow radical resection capsule soft tissue heterotopic bone with contracture release;  Surgeon: Trung Bonilla MD;  Location:   [facility];  Service:     HYSTERECTOMY      TOTAL    REPLACEMENT TOTAL KNEE  2003    SKIN GRAFT Right 2014    Foot    TOTAL ABDOMINAL HYSTERECTOMY WITH SALPINGO OOPHORECTOMY  1982    TOTAL ELBOW ARTHROPLASTY Right 09/12/2016    Procedure: TOTAL ELBOW ARTHROPLASTY; ULNAR NERVE TRANSPOSITION;  Surgeon: Trung Bonilla MD;  Location:  [facility];  Service:     ULNAR NERVE TRANSPOSITION Right 2016    UPPER GASTROINTESTINAL ENDOSCOPY  2013       Family History:   Family History   Problem Relation Age of Onset    Heart failure Mother     Stroke Mother 70    Lung cancer Father 63       Social History:   Social History     Socioeconomic History    Marital status:    Tobacco Use    Smoking status: Never    Smokeless tobacco: Never   Vaping Use    Vaping Use: Never used   Substance and Sexual Activity    Alcohol use: No    Drug use: No    Sexual activity: Yes     Partners: Male     Comment:  x 58 years       Immunizations:   Immunization History   Administered Date(s) Administered    COVID-19 (MODERNA) 1st,2nd,3rd Dose Monovalent 03/02/2021, 03/30/2021, 11/17/2021    Pneumococcal Conjugate 13-Valent (PCV13) 04/10/2008, 04/26/2017    Td 06/03/2004, 10/30/2015        Medications:     Current Outpatient Medications:     aspirin 81 MG EC tablet, Take 1 tablet by mouth Daily., Disp: , Rfl:     cholecalciferol (VITAMIN D3) 1000 UNITS tablet, Take 2 tablets by mouth Daily., Disp: , Rfl:     Combigan 0.2-0.5 % ophthalmic solution, , Disp: , Rfl:     ferrous sulfate 325 (65 FE) MG tablet, Take 1 tablet by mouth Daily With Breakfast., Disp: , Rfl:     HYDROcodone-acetaminophen (NORCO) 7.5-325 MG per tablet, , Disp: , Rfl:     Mirabegron ER (MYRBETRIQ) 50 MG tablet sustained-release 24 hour 24 hr tablet, Take 50 mg by mouth Daily., Disp: , Rfl:     nitrofurantoin, macrocrystal-monohydrate, (Macrobid) 100 MG capsule, Take 1 capsule by mouth 2 (Two) Times a Day for 7 days., Disp: 14 capsule, Rfl: 0    Raloxifene HCl  "(EVISTA PO), Take 60 mg by mouth daily., Disp: , Rfl:     rosuvastatin (CRESTOR) 10 MG tablet, Take 1 tablet by mouth Every Night., Disp: 90 tablet, Rfl: 3    senna (SENOKOT) 8.6 MG tablet, Take 1 tablet by mouth Daily., Disp: , Rfl:     predniSONE (DELTASONE) 10 MG tablet, 3 po qd x 5days, 2 po  qd x 5 days, 1 po qd  x 5 days, Disp: 30 tablet, Rfl: 0    tretinoin (Retin-A) 0.05 % cream, Apply 1 application topically to the appropriate area as directed Every Night., Disp: 20 g, Rfl: 2  No current facility-administered medications for this visit.    Facility-Administered Medications Ordered in Other Visits:     bupivacaine PF (MARCAINE) 0.25 % injection, , , PRN, Sean Brown CRNA, 50 mg at 09/13/16 0759    Allergies:   Allergies   Allergen Reactions    Phenergan [Promethazine Hcl] Hives       Objective     Vital Signs  /62   Pulse 90   Temp 97.3 °F (36.3 °C) (Infrared)   Resp 17   Ht 162.3 cm (63.9\")   Wt 65.8 kg (145 lb 1.6 oz)   SpO2 95%   BMI 24.99 kg/m²   Estimated body mass index is 24.99 kg/m² as calculated from the following:    Height as of this encounter: 162.3 cm (63.9\").    Weight as of this encounter: 65.8 kg (145 lb 1.6 oz).    BMI is within normal parameters. No other follow-up for BMI required.      Physical Exam  Vitals and nursing note reviewed.   Constitutional:       Appearance: Normal appearance. She is normal weight.   HENT:      Head: Normocephalic.      Right Ear: External ear normal.      Left Ear: External ear normal.      Nose: Nose normal.   Eyes:      Pupils: Pupils are equal, round, and reactive to light.   Neck:      Vascular: No carotid bruit.   Cardiovascular:      Rate and Rhythm: Normal rate and regular rhythm.      Pulses: Normal pulses.      Heart sounds: Normal heart sounds.   Pulmonary:      Effort: Pulmonary effort is normal.      Breath sounds: Normal breath sounds.   Musculoskeletal:      Cervical back: Normal range of motion and neck supple. "   Lymphadenopathy:      Cervical: No cervical adenopathy.   Skin:     General: Skin is warm and dry.   Neurological:      Mental Status: She is alert.   Psychiatric:         Mood and Affect: Mood normal.        Procedures     Assessment and Plan     1. Pleurisy  Diagnosis of exclusion, sounds pleuritic, wanted to order prednisone 30 mg taper to 10 mg.  Come back in 48 hours failed to improve    2. H/O polymyalgia rheumatica  Occasionally hurt down right hip    3. Stage 3 chronic kidney disease, unspecified whether stage 3a or 3b CKD  Stable    4. Primary hypertension  Stable    5. Acquired hypothyroidism  Stable  6.  She is got a few wrinkles she is concerned about and I put her on Retin-A cream 0.05%       Follow Up  Return if symptoms worsen or fail to improve.    [facility]  [address] [place] [id number] [phone]

## 2023-06-08 ENCOUNTER — TELEPHONE (OUTPATIENT)
Dept: FAMILY MEDICINE CLINIC | Facility: CLINIC | Age: 83
End: 2023-06-08

## 2023-06-08 NOTE — TELEPHONE ENCOUNTER
Betty was able to look on [employer] and it is not back yet but will have pt call back tomorrow as well .

## 2023-06-09 LAB
BACTERIA UR CULT: ABNORMAL
BACTERIA UR CULT: ABNORMAL
OTHER ANTIBIOTIC SUSC ISLT: ABNORMAL

## 2023-06-12 ENCOUNTER — OFFICE VISIT (OUTPATIENT)
Dept: FAMILY MEDICINE CLINIC | Facility: CLINIC | Age: 83
End: 2023-06-12
Payer: MEDICARE

## 2023-06-12 VITALS
WEIGHT: 147 LBS | DIASTOLIC BLOOD PRESSURE: 72 MMHG | OXYGEN SATURATION: 98 % | BODY MASS INDEX: 26.05 KG/M2 | HEIGHT: 63 IN | SYSTOLIC BLOOD PRESSURE: 136 MMHG | HEART RATE: 83 BPM

## 2023-06-12 DIAGNOSIS — M81.0 AGE-RELATED OSTEOPOROSIS WITHOUT CURRENT PATHOLOGICAL FRACTURE: ICD-10-CM

## 2023-06-12 DIAGNOSIS — F51.01 PRIMARY INSOMNIA: Primary | ICD-10-CM

## 2023-06-12 PROCEDURE — 3075F SYST BP GE 130 - 139MM HG: CPT | Performed by: FAMILY MEDICINE

## 2023-06-12 PROCEDURE — 99213 OFFICE O/P EST LOW 20 MIN: CPT | Performed by: FAMILY MEDICINE

## 2023-06-12 PROCEDURE — 3078F DIAST BP <80 MM HG: CPT | Performed by: FAMILY MEDICINE

## 2023-06-12 PROCEDURE — 1159F MED LIST DOCD IN RCRD: CPT | Performed by: FAMILY MEDICINE

## 2023-06-12 PROCEDURE — 1160F RVW MEDS BY RX/DR IN RCRD: CPT | Performed by: FAMILY MEDICINE

## 2023-06-12 RX ORDER — LORAZEPAM 0.5 MG/1
0.5 TABLET ORAL
Qty: 30 TABLET | Refills: 5 | Status: SHIPPED | OUTPATIENT
Start: 2023-06-12

## 2023-06-12 NOTE — PROGRESS NOTES
Office Note     Name: Yanet Grove    : 1940     MRN: [id number]     Chief Complaint  Insomnia (Can't sleep going on several months )    Subjective     History of Present Illness:  Yanet Grove is a 82 y.o. female who presents today for who could not sleep.  On Benadryl and it was not working months ago back to lorazepam 0.5 mg asking if she can take 2.  Gabapentin 100 or 200 mg at 6 tried out her mom tried diazepam.  Christina Hickman had her on Evista for her osteoporosis.  She is got several months left    She feels fine from the pleurisy.  She feels fine from the UTI    Review of Systems:   Review of Systems    Past Medical History:   Past Medical History:   Diagnosis Date    Acquired hypothyroidism     CKD (chronic kidney disease) stage 3, GFR 30-59 ml/min     Coronary arteriosclerosis     Esophageal stricture     Fibroids     Fracture, patella     2 SURGERIES    GERD (gastroesophageal reflux disease)     GERD (gastroesophageal reflux disease)     Glaucoma     Heterotopic ossification of bone     Right Elbow    High risk medication use     DRUG THERAPY FINDING    History of gastric ulcer     HLD (hyperlipidemia)     HTN (hypertension)     Insomnia     Internal hemorrhoids     Megaloblastic anemia     MRSA (methicillin resistant Staphylococcus aureus)     Osteoarthritis, shoulder     Osteopenia     Osteoporosis     Polymyalgia rheumatica     Pseudomonas infection 2014    Right foot wound    Renal failure syndrome     Rotator cuff tear, left     Urinary incontinence     Vitamin D deficiency        Past Surgical History:   Past Surgical History:   Procedure Laterality Date    CARPAL TUNNEL RELEASE Left     CATARACT EXTRACTION Bilateral     COLONOSCOPY  2011    ELBOW ARTHROTOMY Right 2017    Procedure: Right elbow radical resection capsule soft tissue heterotopic bone with contracture release;  Surgeon: Trung Bonilla MD;  Location: [facility];  Service:     HYSTERECTOMY      TOTAL     REPLACEMENT TOTAL KNEE  2003    SKIN GRAFT Right 2014    Foot    TOTAL ABDOMINAL HYSTERECTOMY WITH SALPINGO OOPHORECTOMY  1982    TOTAL ELBOW ARTHROPLASTY Right 09/12/2016    Procedure: TOTAL ELBOW ARTHROPLASTY; ULNAR NERVE TRANSPOSITION;  Surgeon: Trung Bonilla MD;  Location: [facility];  Service:     ULNAR NERVE TRANSPOSITION Right 2016    UPPER GASTROINTESTINAL ENDOSCOPY  2013       Family History:   Family History   Problem Relation Age of Onset    Heart failure Mother     Stroke Mother 70    Lung cancer Father 63       Social History:   Social History     Socioeconomic History    Marital status:    Tobacco Use    Smoking status: Never    Smokeless tobacco: Never   Vaping Use    Vaping Use: Never used   Substance and Sexual Activity    Alcohol use: No    Drug use: No    Sexual activity: Yes     Partners: Male     Comment:  x 58 years       Immunizations:   Immunization History   Administered Date(s) Administered    COVID-19 (MODERNA) 1st,2nd,3rd Dose Monovalent 03/02/2021, 03/30/2021, 11/17/2021    Pneumococcal Conjugate 13-Valent (PCV13) 04/10/2008, 04/26/2017    Td 06/03/2004, 10/30/2015        Medications:     Current Outpatient Medications:     aspirin 81 MG EC tablet, Take 1 tablet by mouth Daily., Disp: , Rfl:     cholecalciferol (VITAMIN D3) 1000 UNITS tablet, Take 2 tablets by mouth Daily., Disp: , Rfl:     Combigan 0.2-0.5 % ophthalmic solution, , Disp: , Rfl:     ferrous sulfate 325 (65 FE) MG tablet, Take 1 tablet by mouth Daily With Breakfast., Disp: , Rfl:     HYDROcodone-acetaminophen (NORCO) 7.5-325 MG per tablet, , Disp: , Rfl:     Mirabegron ER (MYRBETRIQ) 50 MG tablet sustained-release 24 hour 24 hr tablet, Take 50 mg by mouth Daily., Disp: , Rfl:     predniSONE (DELTASONE) 10 MG tablet, 3 po qd x 5days, 2 po  qd x 5 days, 1 po qd  x 5 days, Disp: 30 tablet, Rfl: 0    Raloxifene HCl (EVISTA PO), Take 60 mg by mouth daily., Disp: , Rfl:     rosuvastatin (CRESTOR) 10 MG  "tablet, Take 1 tablet by mouth Every Night., Disp: 90 tablet, Rfl: 3    senna (SENOKOT) 8.6 MG tablet, Take 1 tablet by mouth Daily., Disp: , Rfl:     tretinoin (Retin-A) 0.05 % cream, Apply 1 application topically to the appropriate area as directed Every Night., Disp: 20 g, Rfl: 2    LORazepam (ATIVAN) 0.5 MG tablet, Take 1 tablet by mouth every night at bedtime., Disp: 30 tablet, Rfl: 5  No current facility-administered medications for this visit.    Facility-Administered Medications Ordered in Other Visits:     bupivacaine PF (MARCAINE) 0.25 % injection, , , PRN, Sean Brown CRNA, 50 mg at 09/13/16 0759    Allergies:   Allergies   Allergen Reactions    Phenergan [Promethazine Hcl] Hives       Objective     Vital Signs  /72 (BP Location: Left arm, Patient Position: Sitting, Cuff Size: Large Adult)   Pulse 83   Ht 160 cm (63\")   Wt 66.7 kg (147 lb)   SpO2 98%   BMI 26.04 kg/m²   Estimated body mass index is 26.04 kg/m² as calculated from the following:    Height as of this encounter: 160 cm (63\").    Weight as of this encounter: 66.7 kg (147 lb).          Physical Exam  Vitals and nursing note reviewed.   Constitutional:       Appearance: Normal appearance. She is normal weight.   HENT:      Head: Normocephalic.      Right Ear: External ear normal.      Left Ear: External ear normal.      Nose: Nose normal.   Eyes:      Pupils: Pupils are equal, round, and reactive to light.   Cardiovascular:      Rate and Rhythm: Normal rate and regular rhythm.   Musculoskeletal:      Cervical back: Normal range of motion and neck supple.   Skin:     General: Skin is warm and dry.   Neurological:      Mental Status: She is alert.   Psychiatric:         Mood and Affect: Mood normal.        Procedures     Assessment and Plan     1. Primary insomnia  Stick with 0.5 mg of Ativan  - LORazepam (ATIVAN) 0.5 MG tablet; Take 1 tablet by mouth every night at bedtime.  Dispense: 30 tablet; Refill: 5    2. Age-related " osteoporosis without current pathological fracture  I will refill her Evista       Follow Up  Return in about 6 months (around 12/12/2023).    Darin JASON PC [facility]  [address] [place] [id number] [phone]

## 2023-07-13 NOTE — ANESTHESIA PROCEDURE NOTES
Airway  Airway not difficult    General Information and Staff    Patient location during procedure: OR  CRNA: AVELINO WILLIAMSON    Indications and Patient Condition  Indications for airway management: airway protection    Preoxygenated: yes  MILS not maintained throughout  Mask difficulty assessment: 1 - vent by mask    Final Airway Details  Final airway type: endotracheal airway      Successful airway: ETT  Cuffed: yes   Successful intubation technique: direct laryngoscopy  Endotracheal tube insertion site: oral  Blade: Crystal  Blade size: #3  ETT size: 7.0 mm  Cormack-Lehane Classification: grade I - full view of glottis  Placement verified by: chest auscultation and capnometry   Measured from: lips  ETT to lips (cm): 20  Number of attempts at approach: 1    Additional Comments  Negative epigastric sounds, Breath sound equal bilaterally with symmetric chest rise and fall             Quality 130: Documentation Of Current Medications In The Medical Record: Current Medications Documented Quality 431: Preventive Care And Screening: Unhealthy Alcohol Use - Screening: Patient not identified as an unhealthy alcohol user when screened for unhealthy alcohol use using a systematic screening method Quality 226: Preventive Care And Screening: Tobacco Use: Screening And Cessation Intervention: Patient screened for tobacco use and is an ex/non-smoker Detail Level: Detailed

## 2023-07-18 ENCOUNTER — TELEPHONE (OUTPATIENT)
Dept: FAMILY MEDICINE CLINIC | Facility: CLINIC | Age: 83
End: 2023-07-18

## 2023-07-18 NOTE — TELEPHONE ENCOUNTER
Patient needs refill on Evista PO please call into [facility] in [place]. Thank you.

## 2023-07-19 RX ORDER — RALOXIFENE HYDROCHLORIDE 60 MG/1
60 TABLET, FILM COATED ORAL DAILY
Qty: 90 TABLET | Refills: 1 | Status: SHIPPED | OUTPATIENT
Start: 2023-07-19

## 2023-07-19 NOTE — TELEPHONE ENCOUNTER
Caller: Yanet Grove    Relationship: Self    Best call back number: [phone]     Requested Prescriptions:     Raloxifene HCl (EVISTA PO)      Pharmacy where request should be sent: [facility] [id number]  [facility] [place]  [address] - [phone] [facility] [phone] FX     Last office visit with prescribing clinician: 6/12/2023   Last telemedicine visit with prescribing clinician: Visit date not found   Next office visit with prescribing clinician: Visit date not found     Additional details provided by patient:     PATIENT STATED SHE IS COMPLETELY OUT OF THE MEDICATION.     PATIENT STATED SHE WAS INFORMED BY DR WALL THAT HE WILL CALL THIS IN FOR HER.     Does the patient have less than a 3 day supply:  [x] Yes  [] No    Would you like a call back once the refill request has been completed: [x] Yes [] No    If the office needs to give you a call back, can they leave a voicemail: [x] Yes [] No    Christina Hamlin Rep   07/19/23 11:03 EDT

## 2023-07-26 ENCOUNTER — OFFICE VISIT (OUTPATIENT)
Dept: CARDIOLOGY | Facility: CLINIC | Age: 83
End: 2023-07-26
Payer: MEDICARE

## 2023-07-26 VITALS
WEIGHT: 145 LBS | SYSTOLIC BLOOD PRESSURE: 122 MMHG | BODY MASS INDEX: 24.75 KG/M2 | HEART RATE: 75 BPM | HEIGHT: 64 IN | OXYGEN SATURATION: 98 % | DIASTOLIC BLOOD PRESSURE: 68 MMHG

## 2023-07-26 DIAGNOSIS — E78.2 MIXED HYPERLIPIDEMIA: ICD-10-CM

## 2023-07-26 DIAGNOSIS — I25.10 CORONARY ARTERY DISEASE INVOLVING NATIVE CORONARY ARTERY OF NATIVE HEART WITHOUT ANGINA PECTORIS: Primary | ICD-10-CM

## 2023-07-26 PROCEDURE — 3074F SYST BP LT 130 MM HG: CPT | Performed by: INTERNAL MEDICINE

## 2023-07-26 PROCEDURE — 3078F DIAST BP <80 MM HG: CPT | Performed by: INTERNAL MEDICINE

## 2023-07-26 PROCEDURE — 99213 OFFICE O/P EST LOW 20 MIN: CPT | Performed by: INTERNAL MEDICINE

## 2023-07-26 NOTE — PROGRESS NOTES
[facility] Cardiology  Office Progress Note  Yanet Grove  1940  [address] [place] [address]       Visit Date: 07/26/23    PCP: Darin Baugh MD  [address]  [place] [address]    IDENTIFICATION: A 83 y.o. female  2018 from [facility].    PROBLEM LIST:   chest pain   LHC 2006, non-obstructive disease, LV 60% per Rukavina.  Echo 2006 wnl with mild aortic sclerosis, mild mr, mild tr  HLD  5/12: , TG 98, HDL 52,   5/8/21 139/148/57/152  9/21 150/155/51/72  Glaucoma  followed per Dixie.  GERD   with positive h.pylori per Dr. Ivan 2010  ckd 3- 1.4 [facility]  Polymyalgia rheumatica  Infection with continued abx as of 7/17- Filippo ID  Surgical Hx:  Knee replacement  Elbow fracture s/p ORIF 2016, ty       CC:   Chief Complaint   Patient presents with    Coronary artery disease involving native coronary artery of     Coronary artery disease involving native coronary artery of native heart without angina pectoris           Allergies  Allergies   Allergen Reactions    Phenergan [Promethazine Hcl] Hives       Current Medications    Current Outpatient Medications:     aspirin 81 MG EC tablet, Take 1 tablet by mouth Daily., Disp: , Rfl:     cholecalciferol (VITAMIN D3) 1000 UNITS tablet, Take 2 tablets by mouth Daily., Disp: , Rfl:     Doxylamine Succinate, Sleep, (SLEEP AID PO), Take  by mouth., Disp: , Rfl:     ferrous sulfate 325 (65 FE) MG tablet, Take 1 tablet by mouth Daily With Breakfast., Disp: , Rfl:     Mirabegron ER (MYRBETRIQ) 50 MG tablet sustained-release 24 hour 24 hr tablet, Take 50 mg by mouth Daily., Disp: , Rfl:     raloxifene (Evista) 60 MG tablet, Take 1 tablet by mouth Daily., Disp: 90 tablet, Rfl: 1    rosuvastatin (CRESTOR) 10 MG tablet, Take 1 tablet by mouth Every Night., Disp: 90 tablet, Rfl: 3    senna (SENOKOT) 8.6 MG tablet, Take 1 tablet by mouth Daily., Disp: , Rfl:   No current facility-administered  "medications for this visit.    Facility-Administered Medications Ordered in Other Visits:     bupivacaine PF (MARCAINE) 0.25 % injection, , , PRN, Sean Brown CRNA, 50 mg at 09/13/16 0759      History of Present Illness   Yanet Grove is a 83 y.o. year old female here for follow up.  Feels well no provocative symptoms continues to be active with no sustained exercise.      OBJECTIVE:  Vitals:    07/26/23 1019   BP: 122/68   BP Location: Right arm   Patient Position: Sitting   Cuff Size: Adult   Pulse: 75   SpO2: 98%   Weight: 65.8 kg (145 lb)   Height: 162.6 cm (64\")     Body mass index is 24.89 kg/m².    Constitutional:       Appearance: Healthy appearance. Not in distress.   Neck:      Vascular: No JVR. JVD normal.   Pulmonary:      Effort: Pulmonary effort is normal.      Breath sounds: Normal breath sounds. No wheezing. No rhonchi. No rales.   Chest:      Chest wall: Not tender to palpatation.   Cardiovascular:      PMI at left midclavicular line. Normal rate. Regular rhythm. Normal S1. Normal S2.       Murmurs: There is no murmur.      No gallop.  No click. No rub.   Pulses:     Intact distal pulses.   Edema:     Peripheral edema absent.   Abdominal:      General: Bowel sounds are normal.      Palpations: Abdomen is soft.      Tenderness: There is no abdominal tenderness.   Musculoskeletal: Normal range of motion.         General: No tenderness. Skin:     General: Skin is warm and dry.   Neurological:      General: No focal deficit present.      Mental Status: Alert and oriented to person, place and time.       Diagnostic Data:  Procedures      ASSESSMENT:   Diagnosis Plan   1. Coronary artery disease involving native coronary artery of native heart without angina pectoris        2. Mixed hyperlipidemia              PLAN:  CAD nonobstructive asymptomatic continued medical therapy    Mixed dyslipidemia controlled on statin therapy            Darin Lee MD, FACC  "

## 2023-07-28 ENCOUNTER — OFFICE VISIT (OUTPATIENT)
Dept: FAMILY MEDICINE CLINIC | Facility: CLINIC | Age: 83
End: 2023-07-28
Payer: MEDICARE

## 2023-07-28 VITALS
DIASTOLIC BLOOD PRESSURE: 82 MMHG | HEIGHT: 64 IN | HEART RATE: 84 BPM | WEIGHT: 146.8 LBS | SYSTOLIC BLOOD PRESSURE: 158 MMHG | TEMPERATURE: 98.4 F | OXYGEN SATURATION: 98 % | BODY MASS INDEX: 25.06 KG/M2

## 2023-07-28 DIAGNOSIS — J06.9 VIRAL UPPER RESPIRATORY TRACT INFECTION: Primary | ICD-10-CM

## 2023-07-28 PROCEDURE — 3077F SYST BP >= 140 MM HG: CPT | Performed by: INTERNAL MEDICINE

## 2023-07-28 PROCEDURE — 1160F RVW MEDS BY RX/DR IN RCRD: CPT | Performed by: INTERNAL MEDICINE

## 2023-07-28 PROCEDURE — 1159F MED LIST DOCD IN RCRD: CPT | Performed by: INTERNAL MEDICINE

## 2023-07-28 PROCEDURE — 3079F DIAST BP 80-89 MM HG: CPT | Performed by: INTERNAL MEDICINE

## 2023-07-28 PROCEDURE — 99213 OFFICE O/P EST LOW 20 MIN: CPT | Performed by: INTERNAL MEDICINE

## 2023-07-28 RX ORDER — HYDROCODONE POLISTIREX AND CHLORPHENIRAMINE POLISTIREX 10; 8 MG/5ML; MG/5ML
5 SUSPENSION, EXTENDED RELEASE ORAL EVERY 12 HOURS PRN
Qty: 60 ML | Refills: 0 | Status: SHIPPED | OUTPATIENT
Start: 2023-07-28

## 2023-08-02 ENCOUNTER — LAB (OUTPATIENT)
Dept: FAMILY MEDICINE CLINIC | Facility: CLINIC | Age: 83
End: 2023-08-02
Payer: MEDICARE

## 2023-08-03 LAB
25(OH)D3+25(OH)D2 SERPL-MCNC: 71.4 NG/ML (ref 30–100)
ALBUMIN SERPL-MCNC: 4 G/DL (ref 3.7–4.7)
ALBUMIN/GLOB SERPL: 1.9 {RATIO} (ref 1.2–2.2)
ALP SERPL-CCNC: 58 IU/L (ref 44–121)
ALT SERPL-CCNC: 6 IU/L (ref 0–32)
AMBIG ABBREV CMP14 DEFAULT: NORMAL
AMBIG ABBREV LP DEFAULT: NORMAL
AST SERPL-CCNC: 15 IU/L (ref 0–40)
BASOPHILS # BLD AUTO: 0.1 X10E3/UL (ref 0–0.2)
BASOPHILS NFR BLD AUTO: 2 %
BILIRUB SERPL-MCNC: 1.4 MG/DL (ref 0–1.2)
BUN SERPL-MCNC: 11 MG/DL (ref 8–27)
BUN/CREAT SERPL: 9 (ref 12–28)
CALCIUM SERPL-MCNC: 9.2 MG/DL (ref 8.7–10.3)
CHLORIDE SERPL-SCNC: 105 MMOL/L (ref 96–106)
CHOLEST SERPL-MCNC: 142 MG/DL (ref 100–199)
CO2 SERPL-SCNC: 25 MMOL/L (ref 20–29)
CREAT SERPL-MCNC: 1.28 MG/DL (ref 0.57–1)
EGFRCR SERPLBLD CKD-EPI 2021: 42 ML/MIN/1.73
EOSINOPHIL # BLD AUTO: 0.3 X10E3/UL (ref 0–0.4)
EOSINOPHIL NFR BLD AUTO: 5 %
ERYTHROCYTE [DISTWIDTH] IN BLOOD BY AUTOMATED COUNT: 13.1 % (ref 11.7–15.4)
FOLATE SERPL-MCNC: 3.9 NG/ML
GLOBULIN SER CALC-MCNC: 2.1 G/DL (ref 1.5–4.5)
GLUCOSE SERPL-MCNC: 106 MG/DL (ref 70–99)
HCT VFR BLD AUTO: 35 % (ref 34–46.6)
HDLC SERPL-MCNC: 53 MG/DL
HGB BLD-MCNC: 11 G/DL (ref 11.1–15.9)
IMM GRANULOCYTES # BLD AUTO: 0 X10E3/UL (ref 0–0.1)
IMM GRANULOCYTES NFR BLD AUTO: 0 %
LDLC SERPL CALC-MCNC: 72 MG/DL (ref 0–99)
LYMPHOCYTES # BLD AUTO: 1.7 X10E3/UL (ref 0.7–3.1)
LYMPHOCYTES NFR BLD AUTO: 26 %
MCH RBC QN AUTO: 28.9 PG (ref 26.6–33)
MCHC RBC AUTO-ENTMCNC: 31.4 G/DL (ref 31.5–35.7)
MCV RBC AUTO: 92 FL (ref 79–97)
MONOCYTES # BLD AUTO: 0.6 X10E3/UL (ref 0.1–0.9)
MONOCYTES NFR BLD AUTO: 9 %
NEUTROPHILS # BLD AUTO: 3.6 X10E3/UL (ref 1.4–7)
NEUTROPHILS NFR BLD AUTO: 58 %
PLATELET # BLD AUTO: 246 X10E3/UL (ref 150–450)
POTASSIUM SERPL-SCNC: 4.4 MMOL/L (ref 3.5–5.2)
PROT SERPL-MCNC: 6.1 G/DL (ref 6–8.5)
RBC # BLD AUTO: 3.8 X10E6/UL (ref 3.77–5.28)
SODIUM SERPL-SCNC: 142 MMOL/L (ref 134–144)
TRIGL SERPL-MCNC: 90 MG/DL (ref 0–149)
TSH SERPL DL<=0.005 MIU/L-ACNC: 5.38 UIU/ML (ref 0.45–4.5)
VIT B12 SERPL-MCNC: 399 PG/ML (ref 232–1245)
VLDLC SERPL CALC-MCNC: 17 MG/DL (ref 5–40)
WBC # BLD AUTO: 6.3 X10E3/UL (ref 3.4–10.8)

## 2023-10-10 ENCOUNTER — OFFICE VISIT (OUTPATIENT)
Dept: FAMILY MEDICINE CLINIC | Facility: CLINIC | Age: 83
End: 2023-10-10
Payer: MEDICARE

## 2023-10-10 VITALS
DIASTOLIC BLOOD PRESSURE: 70 MMHG | BODY MASS INDEX: 24.59 KG/M2 | OXYGEN SATURATION: 98 % | WEIGHT: 144 LBS | HEART RATE: 67 BPM | HEIGHT: 64 IN | SYSTOLIC BLOOD PRESSURE: 120 MMHG

## 2023-10-10 DIAGNOSIS — M79.644 PAIN OF RIGHT THUMB: ICD-10-CM

## 2023-10-10 DIAGNOSIS — N28.9 RENAL DISORDER: Primary | ICD-10-CM

## 2023-10-10 DIAGNOSIS — M79.672 FOOT PAIN, LEFT: ICD-10-CM

## 2023-10-10 PROCEDURE — 1160F RVW MEDS BY RX/DR IN RCRD: CPT | Performed by: FAMILY MEDICINE

## 2023-10-10 PROCEDURE — 3078F DIAST BP <80 MM HG: CPT | Performed by: FAMILY MEDICINE

## 2023-10-10 PROCEDURE — 3074F SYST BP LT 130 MM HG: CPT | Performed by: FAMILY MEDICINE

## 2023-10-10 PROCEDURE — 99214 OFFICE O/P EST MOD 30 MIN: CPT | Performed by: FAMILY MEDICINE

## 2023-10-10 PROCEDURE — 1159F MED LIST DOCD IN RCRD: CPT | Performed by: FAMILY MEDICINE

## 2023-10-10 NOTE — PROGRESS NOTES
Office Note     Name: Yanet Grove    : 1940     MRN: [id number]     Chief Complaint  Hand Pain (Right hand around thumb./Leg scar issue. )    Subjective     History of Present Illness:  Yanet Grove is a 83 y.o. female who presents today for right hand pain, leg scar issue.  The right thumb hurts right between the joints first carpometacarpal and metacarpal phalangeal joint.  She can do anything she wants to with that thumb but is has osteoarthritis in it.  The right ankle she called [facility] but she was directed to Asian female Dr. JOHNSON plastic surgery.  At  diagnosed with her with tight feeling, pain, etc. she will have to do Tylenol arthritis to twice daily according to his kidneys    Review of Systems:   Review of Systems    Past Medical History:   Past Medical History:   Diagnosis Date    Acquired hypothyroidism     CKD (chronic kidney disease) stage 3, GFR 30-59 ml/min     Coronary arteriosclerosis     Esophageal stricture     Fibroids     Fracture, patella     2 SURGERIES    GERD (gastroesophageal reflux disease)     GERD (gastroesophageal reflux disease)     Glaucoma     Heterotopic ossification of bone 2016    Right Elbow    High risk medication use     DRUG THERAPY FINDING    History of gastric ulcer     HLD (hyperlipidemia)     HTN (hypertension)     Insomnia     Internal hemorrhoids     Megaloblastic anemia     MRSA (methicillin resistant Staphylococcus aureus)     Osteoarthritis, shoulder     Osteopenia     Osteoporosis     Polymyalgia rheumatica     Pseudomonas infection     Right foot wound    Renal failure syndrome     Rotator cuff tear, left     Urinary incontinence     Vitamin D deficiency        Past Surgical History:   Past Surgical History:   Procedure Laterality Date    CARPAL TUNNEL RELEASE Left     CATARACT EXTRACTION Bilateral     COLONOSCOPY  2011    ELBOW ARTHROTOMY Right 2017    Procedure: Right elbow radical resection capsule soft tissue  heterotopic bone with contracture release;  Surgeon: Trung Bonilla MD;  Location:  [facility];  Service:     HYSTERECTOMY      TOTAL    REPLACEMENT TOTAL KNEE  2003    SKIN GRAFT Right 2014    Foot    TOTAL ABDOMINAL HYSTERECTOMY WITH SALPINGO OOPHORECTOMY  1982    TOTAL ELBOW ARTHROPLASTY Right 09/12/2016    Procedure: TOTAL ELBOW ARTHROPLASTY; ULNAR NERVE TRANSPOSITION;  Surgeon: Trung Bonilla MD;  Location:  [facility];  Service:     ULNAR NERVE TRANSPOSITION Right 2016    UPPER GASTROINTESTINAL ENDOSCOPY  2013       Family History:   Family History   Problem Relation Age of Onset    Heart failure Mother     Stroke Mother 70    Lung cancer Father 63       Social History:   Social History     Socioeconomic History    Marital status:    Tobacco Use    Smoking status: Never     Passive exposure: Never    Smokeless tobacco: Never   Vaping Use    Vaping Use: Never used   Substance and Sexual Activity    Alcohol use: No    Drug use: No    Sexual activity: Not Currently     Partners: Male     Comment:  x 58 years       Immunizations:   Immunization History   Administered Date(s) Administered    COVID-19 (MODERNA) 1st,2nd,3rd Dose Monovalent 03/02/2021, 03/30/2021, 11/17/2021    Pneumococcal Conjugate 13-Valent (PCV13) 04/10/2008, 04/26/2017    Td (TDVAX) 06/03/2004, 10/30/2015        Medications:     Current Outpatient Medications:     aspirin 81 MG EC tablet, Take 1 tablet by mouth Daily., Disp: , Rfl:     cholecalciferol (VITAMIN D3) 1000 UNITS tablet, Take 2 tablets by mouth Daily., Disp: , Rfl:     Doxylamine Succinate, Sleep, (SLEEP AID PO), Take  by mouth., Disp: , Rfl:     ferrous sulfate 325 (65 FE) MG tablet, Take 1 tablet by mouth Daily With Breakfast., Disp: , Rfl:     Hydrocod Kwadwo-Chlorphe Kwadwo ER (TUSSIONEX PENNKINETIC) 10-8 MG/5ML ER suspension, Take 5 mL by mouth Every 12 (Twelve) Hours As Needed for Cough., Disp: 60 mL, Rfl: 0    Mirabegron ER (MYRBETRIQ) 50 MG tablet  "sustained-release 24 hour 24 hr tablet, Take 50 mg by mouth Daily., Disp: , Rfl:     raloxifene (Evista) 60 MG tablet, Take 1 tablet by mouth Daily., Disp: 90 tablet, Rfl: 1    rosuvastatin (CRESTOR) 10 MG tablet, Take 1 tablet by mouth Every Night., Disp: 90 tablet, Rfl: 3    senna (SENOKOT) 8.6 MG tablet, Take 1 tablet by mouth Daily., Disp: , Rfl:     Diclofenac Sodium (VOLTAREN) 1 % gel gel, Apply 1 g topically to the appropriate area as directed 3 (Three) Times a Day. For your thumb, Disp: 100 g, Rfl: 0  No current facility-administered medications for this visit.    Facility-Administered Medications Ordered in Other Visits:     bupivacaine PF (MARCAINE) 0.25 % injection, , , PRN, Sean Brown CRNA, 50 mg at 09/13/16 0759    Allergies:   Allergies   Allergen Reactions    Phenergan [Promethazine Hcl] Hives       Objective     Vital Signs  /70   Pulse 67   Ht 162.6 cm (64.02\")   Wt 65.3 kg (144 lb)   SpO2 98%   BMI 24.71 kg/mý   Estimated body mass index is 24.71 kg/mý as calculated from the following:    Height as of this encounter: 162.6 cm (64.02\").    Weight as of this encounter: 65.3 kg (144 lb).    BMI is within normal parameters. No other follow-up for BMI required.      Physical Exam  Vitals and nursing note reviewed.   Constitutional:       Appearance: Normal appearance. She is normal weight.   HENT:      Head: Normocephalic.      Right Ear: External ear normal.      Left Ear: External ear normal.      Nose: Nose normal.   Eyes:      Pupils: Pupils are equal, round, and reactive to light.   Cardiovascular:      Rate and Rhythm: Normal rate and regular rhythm.      Pulses: Normal pulses.      Heart sounds: Normal heart sounds.   Musculoskeletal:      Cervical back: Normal range of motion and neck supple.      Comments: The joint is good between the first metacarpal, right hand, is the pain there.  She is got full range of motion and squeezes 5/5.  The graft done on her right anterior ankle " heel healed well no warmth, no chills, no heat.  But it states there inflammation of the talus?   Skin:     General: Skin is warm and dry.   Neurological:      Mental Status: She is alert.   Psychiatric:         Mood and Affect: Mood normal.          Procedures     Assessment and Plan     1. Renal disorder  We are protecting the kidneys but I do not mind using 0.5 g 3 times daily to right thumb    2. Pain of right thumb  Voltaren gel right right thumb 3 times daily    3. Foot pain, left  Tylenol arthritis to twice daily checkup in 2 weeks       Follow Up  Return in about 3 months (around 1/10/2024).    Darin JASON PC [facility]  [address] [facility] [address] [phone]

## 2023-11-09 ENCOUNTER — TELEPHONE (OUTPATIENT)
Dept: FAMILY MEDICINE CLINIC | Facility: CLINIC | Age: 83
End: 2023-11-09
Payer: MEDICARE

## 2023-11-09 NOTE — TELEPHONE ENCOUNTER
[facility] home health called requesting Dr Baugh to follow patient for home health orders. Call back is [phone]

## 2023-11-14 ENCOUNTER — READMISSION MANAGEMENT (OUTPATIENT)
Dept: CALL CENTER | Facility: HOSPITAL | Age: 83
End: 2023-11-14
Payer: MEDICARE

## 2023-11-14 NOTE — OUTREACH NOTE
Prep Survey      Flowsheet Row Responses   Scientologist facility patient discharged from? Non-BH   Is LACE score < 7 ? Non-BH Discharge   Eligibility [facility]   Date of Discharge 11/14/23   [place] [place] [facility]   Discharge diagnosis Displaced intertrochanteric fracture of right femur,   Does the patient have one of the following disease processes/diagnoses(primary or secondary)? Other   Does the patient have Home health ordered? Yes   What is the Home health agency?  home with [facility]   Prep survey completed? Yes            Sheryl SHEETS - Registered Nurse

## 2023-11-15 ENCOUNTER — TRANSITIONAL CARE MANAGEMENT TELEPHONE ENCOUNTER (OUTPATIENT)
Dept: CALL CENTER | Facility: HOSPITAL | Age: 83
End: 2023-11-15
Payer: MEDICARE

## 2023-11-15 ENCOUNTER — OFFICE VISIT (OUTPATIENT)
Dept: FAMILY MEDICINE CLINIC | Facility: CLINIC | Age: 83
End: 2023-11-15
Payer: MEDICARE

## 2023-11-15 VITALS
WEIGHT: 139 LBS | BODY MASS INDEX: 23.73 KG/M2 | HEIGHT: 64 IN | SYSTOLIC BLOOD PRESSURE: 112 MMHG | HEART RATE: 70 BPM | DIASTOLIC BLOOD PRESSURE: 42 MMHG | OXYGEN SATURATION: 98 %

## 2023-11-15 DIAGNOSIS — N18.30 STAGE 3 CHRONIC KIDNEY DISEASE, UNSPECIFIED WHETHER STAGE 3A OR 3B CKD: ICD-10-CM

## 2023-11-15 DIAGNOSIS — M35.3 POLYMYALGIA RHEUMATICA: ICD-10-CM

## 2023-11-15 DIAGNOSIS — M79.644 PAIN OF RIGHT THUMB: ICD-10-CM

## 2023-11-15 DIAGNOSIS — I10 PRIMARY HYPERTENSION: ICD-10-CM

## 2023-11-15 DIAGNOSIS — M81.0 OSTEOPOROSIS WITHOUT CURRENT PATHOLOGICAL FRACTURE, UNSPECIFIED OSTEOPOROSIS TYPE: Primary | ICD-10-CM

## 2023-11-15 DIAGNOSIS — E03.9 ACQUIRED HYPOTHYROIDISM: ICD-10-CM

## 2023-11-15 RX ORDER — HYDROCODONE BITARTRATE AND ACETAMINOPHEN 5; 325 MG/1; MG/1
2 TABLET ORAL EVERY 6 HOURS PRN
Qty: 18 TABLET | Refills: 0 | Status: SHIPPED | OUTPATIENT
Start: 2023-11-15

## 2023-11-15 RX ORDER — HYDROCODONE BITARTRATE AND ACETAMINOPHEN 5; 325 MG/1; MG/1
2 TABLET ORAL EVERY 6 HOURS PRN
COMMUNITY
Start: 2023-11-13 | End: 2023-11-15 | Stop reason: SDUPTHER

## 2023-11-15 NOTE — PROGRESS NOTES
Office Note     Name: Yanet Grove    : 1940     MRN: [id number]     Chief Complaint  Hospital Follow Up Visit (Hip broke. )    Subjective     History of Present Illness:  Yanet Grove is a 83 y.o. female who presents today for posthospital post [facility] consult.  She fractured her right hip on 102 she felt it before she fractured and in falling down.  She felt that before the fracture.  She got operated on by Dr. Painter 10/28/2023 spent a hospital few days and go to [facility] obtain therapy.  She is on a 14 shots of enoxaparin and needs a pain pills refilled as needed she took the 2 to 3 months of polymyalgia rheumatica prednisone.  Been 5 years and is guarded to dad done a DEXA scan.  She was taking Evista    Review of Systems:   Review of Systems    Past Medical History:   Past Medical History:   Diagnosis Date    Acquired hypothyroidism     CKD (chronic kidney disease) stage 3, GFR 30-59 ml/min     Coronary arteriosclerosis     Esophageal stricture     Fibroids     Fracture, patella     2 SURGERIES    GERD (gastroesophageal reflux disease)     GERD (gastroesophageal reflux disease)     Glaucoma     Heterotopic ossification of bone 2016    Right Elbow    High risk medication use     DRUG THERAPY FINDING    History of gastric ulcer     HLD (hyperlipidemia)     HTN (hypertension)     Insomnia     Internal hemorrhoids     Megaloblastic anemia     MRSA (methicillin resistant Staphylococcus aureus)     Osteoarthritis, shoulder     Osteopenia     Osteoporosis     Polymyalgia rheumatica     Pseudomonas infection     Right foot wound    Renal failure syndrome     Rotator cuff tear, left     Urinary incontinence     Vitamin D deficiency        Past Surgical History:   Past Surgical History:   Procedure Laterality Date    CARPAL TUNNEL RELEASE Left     CATARACT EXTRACTION Bilateral     COLONOSCOPY  2011    ELBOW ARTHROTOMY Right 2017    Procedure: Right elbow radical resection  capsule soft tissue heterotopic bone with contracture release;  Surgeon: Trung Bonilla MD;  Location:  [facility];  Service:     HYSTERECTOMY      TOTAL    REPLACEMENT TOTAL KNEE  2003    SKIN GRAFT Right 2014    Foot    TOTAL ABDOMINAL HYSTERECTOMY WITH SALPINGO OOPHORECTOMY  1982    TOTAL ELBOW ARTHROPLASTY Right 09/12/2016    Procedure: TOTAL ELBOW ARTHROPLASTY; ULNAR NERVE TRANSPOSITION;  Surgeon: Trung Bonilla MD;  Location:  [facility];  Service:     ULNAR NERVE TRANSPOSITION Right 2016    UPPER GASTROINTESTINAL ENDOSCOPY  2013       Family History:   Family History   Problem Relation Age of Onset    Heart failure Mother     Stroke Mother 70    Lung cancer Father 63       Social History:   Social History     Socioeconomic History    Marital status:    Tobacco Use    Smoking status: Never     Passive exposure: Never    Smokeless tobacco: Never   Vaping Use    Vaping Use: Never used   Substance and Sexual Activity    Alcohol use: No    Drug use: No    Sexual activity: Not Currently     Partners: Male     Comment:  x 58 years       Immunizations:   Immunization History   Administered Date(s) Administered    COVID-19 (MODERNA) 1st,2nd,3rd Dose Monovalent 03/02/2021, 03/30/2021, 11/17/2021    Pneumococcal Conjugate 13-Valent (PCV13) 04/10/2008, 04/26/2017    Td (TDVAX) 06/03/2004, 10/30/2015        Medications:     Current Outpatient Medications:     aspirin 81 MG EC tablet, Take 1 tablet by mouth Daily., Disp: , Rfl:     cholecalciferol (VITAMIN D3) 1000 UNITS tablet, Take 2 tablets by mouth Daily., Disp: , Rfl:     Diclofenac Sodium (VOLTAREN) 1 % gel gel, Apply 1 g topically to the appropriate area as directed 3 (Three) Times a Day. For your thumb, Disp: 100 g, Rfl: 0    Doxylamine Succinate, Sleep, (SLEEP AID PO), Take  by mouth., Disp: , Rfl:     ferrous sulfate 325 (65 FE) MG tablet, Take 1 tablet by mouth Daily With Breakfast., Disp: , Rfl:     Hydrocod Kwadwo-Chlorphe Kwadwo ER  "(TUSSIONEX PENNKINETIC) 10-8 MG/5ML ER suspension, Take 5 mL by mouth Every 12 (Twelve) Hours As Needed for Cough., Disp: 60 mL, Rfl: 0    HYDROcodone-acetaminophen (NORCO) 5-325 MG per tablet, Take 2 tablets by mouth Every 6 (Six) Hours As Needed., Disp: , Rfl:     Mirabegron ER (MYRBETRIQ) 50 MG tablet sustained-release 24 hour 24 hr tablet, Take 50 mg by mouth Daily., Disp: , Rfl:     raloxifene (Evista) 60 MG tablet, Take 1 tablet by mouth Daily., Disp: 90 tablet, Rfl: 1    rosuvastatin (CRESTOR) 10 MG tablet, Take 1 tablet by mouth Every Night., Disp: 90 tablet, Rfl: 3    senna (SENOKOT) 8.6 MG tablet, Take 1 tablet by mouth Daily., Disp: , Rfl:   No current facility-administered medications for this visit.    Facility-Administered Medications Ordered in Other Visits:     bupivacaine PF (MARCAINE) 0.25 % injection, , , PRN, Sean Brown CRNA, 50 mg at 09/13/16 0759    Allergies:   Allergies   Allergen Reactions    Phenergan [Promethazine Hcl] Hives       Objective     Vital Signs  /42   Pulse 70   Ht 162.6 cm (64.02\")   Wt 63 kg (139 lb)   SpO2 98%   BMI 23.84 kg/m²   Estimated body mass index is 23.84 kg/m² as calculated from the following:    Height as of this encounter: 162.6 cm (64.02\").    Weight as of this encounter: 63 kg (139 lb).    BMI is within normal parameters. No other follow-up for BMI required.      Physical Exam  Constitutional:       General: She is not in acute distress.     Appearance: Normal appearance. She is obese. She is not toxic-appearing or diaphoretic.   HENT:      Head: Normocephalic and atraumatic.      Right Ear: External ear normal.      Left Ear: External ear normal.      Nose: Nose normal.   Eyes:      Pupils: Pupils are equal, round, and reactive to light.   Cardiovascular:      Rate and Rhythm: Normal rate and regular rhythm.      Pulses: Normal pulses.      Heart sounds: Normal heart sounds.   Pulmonary:      Effort: Pulmonary effort is normal.      Breath " sounds: Normal breath sounds.   Skin:     General: Skin is warm and dry.   Neurological:      Mental Status: She is alert.   Psychiatric:         Mood and Affect: Mood normal.         Behavior: Behavior normal.          Procedures     Assessment and Plan     1. Osteoporosis without current pathological fracture, unspecified osteoporosis type  Right hip fracture bone density after 1225  - DEXA Bone Density Axial; Future    2. Primary hypertension  Controlled    3. Acquired hypothyroidism  Controlled    4. Stage 3 chronic kidney disease, unspecified whether stage 3a or 3b CKD      5. Polymyalgia rheumatica  In remission    6. Pain of right thumb  Pain right thumb gone       Follow Up  No follow-ups on file.    Darin JASON [facility]  [address] [address] [phone]

## 2023-11-15 NOTE — OUTREACH NOTE
Call Center TCM Note      Flowsheet Row Responses   [facility] facility patient discharged from? Non-[facility]   Does the patient have one of the following disease processes/diagnoses(primary or secondary)? Other   TCM attempt successful? Yes   Discharge diagnosis Displaced intertrochanteric fracture of right femur,   TCM call completed? Yes   Wrap up additional comments TCM appt on 11/15/23--PCP appointment within 2 business days of DC fulfills the TCM requirement, no call necessary.            Jacquie Xiong RN    11/15/2023, 12:29 EST

## 2023-11-17 ENCOUNTER — TELEPHONE (OUTPATIENT)
Dept: FAMILY MEDICINE CLINIC | Facility: CLINIC | Age: 83
End: 2023-11-17
Payer: MEDICARE

## 2023-11-17 NOTE — TELEPHONE ENCOUNTER
Spoke with Lucia from [facility], she needs a verbal from Dr. Baugh or Judy that pt can continue physical therapy. Please call back at [phone]

## 2023-12-06 ENCOUNTER — OUTSIDE FACILITY SERVICE (OUTPATIENT)
Dept: FAMILY MEDICINE CLINIC | Facility: CLINIC | Age: 83
End: 2023-12-06
Payer: MEDICARE

## 2024-01-03 ENCOUNTER — OFFICE VISIT (OUTPATIENT)
Dept: FAMILY MEDICINE CLINIC | Facility: CLINIC | Age: 84
End: 2024-01-03
Payer: MEDICARE

## 2024-01-03 VITALS
HEART RATE: 80 BPM | WEIGHT: 134 LBS | SYSTOLIC BLOOD PRESSURE: 122 MMHG | DIASTOLIC BLOOD PRESSURE: 78 MMHG | OXYGEN SATURATION: 98 % | BODY MASS INDEX: 22.88 KG/M2 | HEIGHT: 64 IN

## 2024-01-03 DIAGNOSIS — R68.2 DRY MOUTH: ICD-10-CM

## 2024-01-03 DIAGNOSIS — H04.129 DRY EYE: ICD-10-CM

## 2024-01-03 DIAGNOSIS — M81.0 AGE-RELATED OSTEOPOROSIS WITHOUT CURRENT PATHOLOGICAL FRACTURE: Primary | ICD-10-CM

## 2024-01-03 PROCEDURE — 1160F RVW MEDS BY RX/DR IN RCRD: CPT | Performed by: FAMILY MEDICINE

## 2024-01-03 PROCEDURE — 1159F MED LIST DOCD IN RCRD: CPT | Performed by: FAMILY MEDICINE

## 2024-01-03 PROCEDURE — 3078F DIAST BP <80 MM HG: CPT | Performed by: FAMILY MEDICINE

## 2024-01-03 PROCEDURE — 99214 OFFICE O/P EST MOD 30 MIN: CPT | Performed by: FAMILY MEDICINE

## 2024-01-03 PROCEDURE — 3074F SYST BP LT 130 MM HG: CPT | Performed by: FAMILY MEDICINE

## 2024-01-03 NOTE — PROGRESS NOTES
Office Note     Name: Yanet Grove    : 1940     MRN: [id number]     Chief Complaint  Results (DEXA scan results. )    Subjective     History of Present Illness:  Yanet Grove is a 83 y.o. female who presents today for 1 going over DEXA to: Long right elbow tiny and it felt 3 mm arising above the skin.  And dry and dry mouth syndrome they uses Systane and fish oil pills to 2 minutes time and then go to 4 at a time for 2 months to 4 months    Review of Systems:   Review of Systems    Past Medical History:   Past Medical History:   Diagnosis Date    Acquired hypothyroidism     CKD (chronic kidney disease) stage 3, GFR 30-59 ml/min     Coronary arteriosclerosis     Esophageal stricture     Fibroids     Fracture, patella     2 SURGERIES    GERD (gastroesophageal reflux disease)     GERD (gastroesophageal reflux disease)     Glaucoma     Heterotopic ossification of bone 2016    Right Elbow    High risk medication use     DRUG THERAPY FINDING    History of gastric ulcer     HLD (hyperlipidemia)     HTN (hypertension)     Insomnia     Internal hemorrhoids     Megaloblastic anemia     MRSA (methicillin resistant Staphylococcus aureus)     Osteoarthritis, shoulder     Osteopenia     Osteoporosis     Polymyalgia rheumatica     Pseudomonas infection 2014    Right foot wound    Renal failure syndrome     Rotator cuff tear, left     Urinary incontinence     Vitamin D deficiency        Past Surgical History:   Past Surgical History:   Procedure Laterality Date    CARPAL TUNNEL RELEASE Left     CATARACT EXTRACTION Bilateral     COLONOSCOPY  2011    ELBOW ARTHROTOMY Right 2017    Procedure: Right elbow radical resection capsule soft tissue heterotopic bone with contracture release;  Surgeon: Trung Bonilla MD;  Location: [facility];  Service:     HYSTERECTOMY      TOTAL    REPLACEMENT TOTAL KNEE  2003    SKIN GRAFT Right 2014    Foot    TOTAL ABDOMINAL HYSTERECTOMY WITH SALPINGO OOPHORECTOMY  1982     TOTAL ELBOW ARTHROPLASTY Right 09/12/2016    Procedure: TOTAL ELBOW ARTHROPLASTY; ULNAR NERVE TRANSPOSITION;  Surgeon: Trung Bonilla MD;  [facility];  Service:     ULNAR NERVE TRANSPOSITION Right 2016    UPPER GASTROINTESTINAL ENDOSCOPY  2013       Family History:   Family History   Problem Relation Age of Onset    Heart failure Mother     Stroke Mother 70    Lung cancer Father 63       Social History:   Social History     Socioeconomic History    Marital status:    Tobacco Use    Smoking status: Never     Passive exposure: Never    Smokeless tobacco: Never   Vaping Use    Vaping Use: Never used   Substance and Sexual Activity    Alcohol use: No    Drug use: No    Sexual activity: Not Currently     Partners: Male     Comment:  x 58 years       Immunizations:   Immunization History   Administered Date(s) Administered    COVID-19 (MODERNA) 1st,2nd,3rd Dose Monovalent 03/02/2021, 03/30/2021, 11/17/2021    Pneumococcal Conjugate 13-Valent (PCV13) 04/10/2008, 04/26/2017    Td (TDVAX) 06/03/2004, 10/30/2015        Medications:     Current Outpatient Medications:     aspirin 81 MG EC tablet, Take 1 tablet by mouth Daily., Disp: , Rfl:     cholecalciferol (VITAMIN D3) 1000 UNITS tablet, Take 2 tablets by mouth Daily., Disp: , Rfl:     Diclofenac Sodium (VOLTAREN) 1 % gel gel, Apply 1 g topically to the appropriate area as directed 3 (Three) Times a Day. For your thumb, Disp: 100 g, Rfl: 0    Doxylamine Succinate, Sleep, (SLEEP AID PO), Take  by mouth., Disp: , Rfl:     ferrous sulfate 325 (65 FE) MG tablet, Take 1 tablet by mouth Daily With Breakfast., Disp: , Rfl:     Hydrocod Kwadwo-Chlorphe Kwadwo ER (TUSSIONEX PENNKINETIC) 10-8 MG/5ML ER suspension, Take 5 mL by mouth Every 12 (Twelve) Hours As Needed for Cough., Disp: 60 mL, Rfl: 0    HYDROcodone-acetaminophen (NORCO) 5-325 MG per tablet, Take 2 tablets by mouth Every 6 (Six) Hours As Needed for Moderate Pain., Disp: 18 tablet, Rfl: 0     "Mirabegron ER (MYRBETRIQ) 50 MG tablet sustained-release 24 hour 24 hr tablet, Take 50 mg by mouth Daily., Disp: , Rfl:     raloxifene (Evista) 60 MG tablet, Take 1 tablet by mouth Daily., Disp: 90 tablet, Rfl: 1    rosuvastatin (CRESTOR) 10 MG tablet, Take 1 tablet by mouth Every Night., Disp: 90 tablet, Rfl: 3    senna (SENOKOT) 8.6 MG tablet, Take 1 tablet by mouth Daily., Disp: , Rfl:   No current facility-administered medications for this visit.    Facility-Administered Medications Ordered in Other Visits:     bupivacaine PF (MARCAINE) 0.25 % injection, , , PRN, Sean Brown CRNA, 50 mg at 09/13/16 0759    Allergies:   Allergies   Allergen Reactions    Phenergan [Promethazine Hcl] Hives       Objective     Vital Signs  /78   Pulse 80   Ht 162.6 cm (64.02\")   Wt 60.8 kg (134 lb)   SpO2 98%   BMI 22.99 kg/m²   Estimated body mass index is 22.99 kg/m² as calculated from the following:    Height as of this encounter: 162.6 cm (64.02\").    Weight as of this encounter: 60.8 kg (134 lb).    BMI is within normal parameters. No other follow-up for BMI required.      Physical Exam  Constitutional:       General: She is not in acute distress.     Appearance: Normal appearance. She is obese. She is not toxic-appearing or diaphoretic.   HENT:      Head: Normocephalic and atraumatic.      Right Ear: External ear normal.      Left Ear: External ear normal.      Nose: Nose normal.   Eyes:      Pupils: Pupils are equal, round, and reactive to light.   Musculoskeletal:        Arms:       Comments: Out of rt., elbow, a hard nailed seb. keratosos   Skin:     General: Skin is warm and dry.   Neurological:      Mental Status: She is alert.   Psychiatric:         Mood and Affect: Mood normal.         Behavior: Behavior normal.          Procedures     Assessment and Plan     1. Age-related osteoporosis without current pathological fracture  She has osteoporosis in her left forearm be careful but that lifting weights all " to help    2. Dry eye  Dry I warned her about 6 syndrome and intolerance to Sjogren's    3. Dry mouth  Warned her about sicca syndrome.       Follow Up  Return in about 6 months (around 7/3/2024).    Darin JASON PC [facility]  [address] [place] [id number] [phone]

## 2024-01-08 ENCOUNTER — TELEPHONE (OUTPATIENT)
Dept: FAMILY MEDICINE CLINIC | Facility: CLINIC | Age: 84
End: 2024-01-08
Payer: MEDICARE

## 2024-01-08 DIAGNOSIS — Z87.39 H/O POLYMYALGIA RHEUMATICA: Primary | ICD-10-CM

## 2024-01-08 NOTE — TELEPHONE ENCOUNTER
Patient came in to the office and is requesting a referral to the [facility] of [place], and is requesting that be sent over urgent. I made an appointment because I was not sure whether or not patient would need an appointment for referral, if not needed let me know and I can call patient and cancel appointment.

## 2024-01-11 DIAGNOSIS — F51.01 PRIMARY INSOMNIA: ICD-10-CM

## 2024-01-12 RX ORDER — LORAZEPAM 0.5 MG/1
0.5 TABLET ORAL
Qty: 30 TABLET | Refills: 1 | Status: SHIPPED | OUTPATIENT
Start: 2024-01-12

## 2024-01-22 ENCOUNTER — TELEPHONE (OUTPATIENT)
Dept: FAMILY MEDICINE CLINIC | Facility: CLINIC | Age: 84
End: 2024-01-22

## 2024-01-22 NOTE — TELEPHONE ENCOUNTER
[facility] called saying they did not receive a referral for this pt. She asked if it could be faxed to [phone].

## 2024-01-31 RX ORDER — ROSUVASTATIN CALCIUM 10 MG/1
10 TABLET, COATED ORAL NIGHTLY
Qty: 90 TABLET | Refills: 3 | Status: SHIPPED | OUTPATIENT
Start: 2024-01-31

## 2024-02-13 RX ORDER — RALOXIFENE HYDROCHLORIDE 60 MG/1
60 TABLET, FILM COATED ORAL DAILY
Qty: 90 TABLET | Refills: 1 | Status: SHIPPED | OUTPATIENT
Start: 2024-02-13

## 2024-02-29 ENCOUNTER — OFFICE VISIT (OUTPATIENT)
Dept: FAMILY MEDICINE CLINIC | Facility: CLINIC | Age: 84
End: 2024-02-29
Payer: MEDICARE

## 2024-02-29 VITALS
DIASTOLIC BLOOD PRESSURE: 70 MMHG | HEART RATE: 52 BPM | BODY MASS INDEX: 22.88 KG/M2 | HEIGHT: 64 IN | WEIGHT: 134 LBS | OXYGEN SATURATION: 100 % | SYSTOLIC BLOOD PRESSURE: 120 MMHG

## 2024-02-29 DIAGNOSIS — R49.0 HOARSENESS: Primary | ICD-10-CM

## 2024-02-29 PROCEDURE — 3074F SYST BP LT 130 MM HG: CPT | Performed by: PHYSICIAN ASSISTANT

## 2024-02-29 PROCEDURE — 99213 OFFICE O/P EST LOW 20 MIN: CPT | Performed by: PHYSICIAN ASSISTANT

## 2024-02-29 PROCEDURE — 3078F DIAST BP <80 MM HG: CPT | Performed by: PHYSICIAN ASSISTANT

## 2024-02-29 NOTE — PROGRESS NOTES
"Chief Complaint  Hoarse (Pt states has lost some of her voice noticed it 2 weeks ago )    Subjective          Yanet Grove presents to [facility]  History of Present Illness  Patient in today for evaluation on hoarseness symptoms that started around 2 weeks ago that she states is progressing. Denies sore throat. Denies nasal congestion. Denies reflux symptoms or cough. She would like a referral to see ENT. She did also start on pilocarpine through rheumatology around 5 weeks ago and wasn't sure if that could be related- has not discussed with rheumatology yet.   Hoarse  This is a new problem. The current episode started 1 to 4 weeks ago. Pertinent negatives include no abdominal pain, chest pain, congestion, coughing, fatigue, fever, nausea, rash, sore throat, swollen glands or vomiting.       Objective   Vital Signs:   /70   Pulse 52   Ht 162.6 cm (64\")   Wt 60.8 kg (134 lb)   SpO2 100%   BMI 23.00 kg/m²     Body mass index is 23 kg/m².    Review of Systems   Constitutional:  Negative for fatigue and fever.   HENT:  Positive for hoarse voice. Negative for congestion, postnasal drip, sinus pressure, sore throat and swollen glands.    Respiratory:  Negative for cough.    Cardiovascular:  Negative for chest pain.   Gastrointestinal:  Negative for abdominal pain, diarrhea, nausea, vomiting and GERD.   Skin:  Negative for rash.   Neurological:  Negative for dizziness and headache.       Past History:  Medical History: has a past medical history of Acquired hypothyroidism, CKD (chronic kidney disease) stage 3, GFR 30-59 ml/min, Coronary arteriosclerosis, Esophageal stricture, Fibroids, Fracture, patella, GERD (gastroesophageal reflux disease), GERD (gastroesophageal reflux disease), Glaucoma, Heterotopic ossification of bone (2016), High risk medication use, History of gastric ulcer, HLD (hyperlipidemia), HTN (hypertension), Insomnia, Internal hemorrhoids, Megaloblastic " anemia, MRSA (methicillin resistant Staphylococcus aureus), Osteoarthritis, shoulder, Osteopenia, Osteoporosis, Polymyalgia rheumatica, Pseudomonas infection (2014), Renal failure syndrome, Rotator cuff tear, left, Urinary incontinence, and Vitamin D deficiency.   Surgical History: has a past surgical history that includes Cataract extraction (Bilateral); Hysterectomy; Carpal tunnel release (Left); Colonoscopy (2011); Replacement total knee (2003); Total elbow arthroplasty (Right, 09/12/2016); Skin graft (Right, 2014); Elbow Arthrotomy (Right, 04/17/2017); Upper gastrointestinal endoscopy (2013); Ulnar nerve transposition (Right, 2016); and Total abdominal hysterectomy w/ bilateral salpingoophorectomy (1982).   Family History: family history includes Heart failure in her mother; Lung cancer (age of onset: 63) in her father; Stroke (age of onset: 70) in her mother.   Social History: reports that she has never smoked. She has never been exposed to tobacco smoke. She has never used smokeless tobacco. She reports that she does not drink alcohol and does not use drugs.      Current Outpatient Medications:     aspirin 81 MG EC tablet, Take 1 tablet by mouth Daily., Disp: , Rfl:     cholecalciferol (VITAMIN D3) 1000 UNITS tablet, Take 2 tablets by mouth Daily., Disp: , Rfl:     Diclofenac Sodium (VOLTAREN) 1 % gel gel, Apply 1 g topically to the appropriate area as directed 3 (Three) Times a Day. For your thumb, Disp: 100 g, Rfl: 0    Doxylamine Succinate, Sleep, (SLEEP AID PO), Take  by mouth., Disp: , Rfl:     ferrous sulfate 325 (65 FE) MG tablet, Take 1 tablet by mouth Daily With Breakfast., Disp: , Rfl:     Hydrocod Kwadwo-Chlorphe Kwadwo ER (TUSSIONEX PENNKINETIC) 10-8 MG/5ML ER suspension, Take 5 mL by mouth Every 12 (Twelve) Hours As Needed for Cough., Disp: 60 mL, Rfl: 0    HYDROcodone-acetaminophen (NORCO) 5-325 MG per tablet, Take 2 tablets by mouth Every 6 (Six) Hours As Needed for Moderate Pain., Disp: 18 tablet,  Rfl: 0    LORazepam (ATIVAN) 0.5 MG tablet, TAKE ONE TABLET BY MOUTH EVERY NIGHT AT BEDTIME, Disp: 30 tablet, Rfl: 1    Mirabegron ER (MYRBETRIQ) 50 MG tablet sustained-release 24 hour 24 hr tablet, Take 50 mg by mouth Daily., Disp: , Rfl:     PILOCARPINE HCL PO, Take 5 mg by mouth 2 (Two) Times a Day., Disp: , Rfl:     raloxifene (EVISTA) 60 MG tablet, TAKE 1 TABLET BY MOUTH DAILY, Disp: 90 tablet, Rfl: 1    rosuvastatin (CRESTOR) 10 MG tablet, TAKE ONE TABLET BY MOUTH ONCE NIGHTLY, Disp: 90 tablet, Rfl: 3    senna (SENOKOT) 8.6 MG tablet, Take 1 tablet by mouth Daily., Disp: , Rfl:   No current facility-administered medications for this visit.    Facility-Administered Medications Ordered in Other Visits:     bupivacaine PF (MARCAINE) 0.25 % injection, , , PRN, Sean Brown CRNA, 50 mg at 09/13/16 0759  Allergies: Phenergan [promethazine hcl]    Physical Exam  Constitutional:       Appearance: Normal appearance.   HENT:      Right Ear: Tympanic membrane normal.      Left Ear: Tympanic membrane normal.      Mouth/Throat:      Pharynx: Oropharynx is clear.   Eyes:      Conjunctiva/sclera: Conjunctivae normal.      Pupils: Pupils are equal, round, and reactive to light.   Cardiovascular:      Rate and Rhythm: Normal rate and regular rhythm.      Heart sounds: Normal heart sounds.   Pulmonary:      Effort: Pulmonary effort is normal.      Breath sounds: Normal breath sounds.   Abdominal:      Palpations: Abdomen is soft.      Tenderness: There is no abdominal tenderness.   Neurological:      Mental Status: She is oriented to person, place, and time.   Psychiatric:         Mood and Affect: Mood normal.         Behavior: Behavior normal.             Assessment and Plan   Diagnoses and all orders for this visit:    1. Hoarseness (Primary)  -     Ambulatory Referral to ENT (Otolaryngology)  Will put in referral for ENT for evaluation and she is going to contact her rheumatologist to discuss if could be adverse effect  from medication.RTC if not improving.           Follow Up   No follow-ups on file.  Patient was given instructions and counseling regarding her condition or for health maintenance advice. Please see specific information pulled into the AVS if appropriate.     Almita Neff PA-C

## 2024-03-12 ENCOUNTER — TELEPHONE (OUTPATIENT)
Dept: FAMILY MEDICINE CLINIC | Facility: CLINIC | Age: 84
End: 2024-03-12
Payer: MEDICARE

## 2024-03-16 ENCOUNTER — OFFICE VISIT (OUTPATIENT)
Dept: FAMILY MEDICINE CLINIC | Facility: CLINIC | Age: 84
End: 2024-03-16
Payer: MEDICARE

## 2024-03-16 VITALS
HEIGHT: 64 IN | TEMPERATURE: 98.1 F | OXYGEN SATURATION: 96 % | HEART RATE: 88 BPM | DIASTOLIC BLOOD PRESSURE: 60 MMHG | WEIGHT: 133 LBS | BODY MASS INDEX: 22.71 KG/M2 | SYSTOLIC BLOOD PRESSURE: 112 MMHG

## 2024-03-16 DIAGNOSIS — J02.9 SORE THROAT: Primary | ICD-10-CM

## 2024-03-16 DIAGNOSIS — J01.00 ACUTE NON-RECURRENT MAXILLARY SINUSITIS: ICD-10-CM

## 2024-03-16 LAB
EXPIRATION DATE: NORMAL
EXPIRATION DATE: NORMAL
FLUAV AG UPPER RESP QL IA.RAPID: NOT DETECTED
FLUBV AG UPPER RESP QL IA.RAPID: NOT DETECTED
INTERNAL CONTROL: NORMAL
INTERNAL CONTROL: NORMAL
Lab: NORMAL
Lab: NORMAL
S PYO AG THROAT QL: NEGATIVE
SARS-COV-2 AG UPPER RESP QL IA.RAPID: NOT DETECTED

## 2024-03-16 PROCEDURE — 3074F SYST BP LT 130 MM HG: CPT | Performed by: PHYSICIAN ASSISTANT

## 2024-03-16 PROCEDURE — 3078F DIAST BP <80 MM HG: CPT | Performed by: PHYSICIAN ASSISTANT

## 2024-03-16 PROCEDURE — 87428 SARSCOV & INF VIR A&B AG IA: CPT | Performed by: PHYSICIAN ASSISTANT

## 2024-03-16 PROCEDURE — 87880 STREP A ASSAY W/OPTIC: CPT | Performed by: PHYSICIAN ASSISTANT

## 2024-03-16 PROCEDURE — 99213 OFFICE O/P EST LOW 20 MIN: CPT | Performed by: PHYSICIAN ASSISTANT

## 2024-03-16 RX ORDER — AMOXICILLIN 500 MG/1
500 CAPSULE ORAL 2 TIMES DAILY
Qty: 20 CAPSULE | Refills: 0 | Status: SHIPPED | OUTPATIENT
Start: 2024-03-16

## 2024-03-16 NOTE — PROGRESS NOTES
"Chief Complaint  Sore Throat (Sore throat since Thursday )    Subjective          Yanet Grove presents to [facility]  History of Present Illness  Patient in today for evaluation on sore throat for the last 3 days.  Denies any fever.  States has had mild nasal congestion as well.  Denies any nausea, vomiting, or diarrhea.  Has had minimal cough.  No known sick contacts.  Sore Throat   This is a new problem. The current episode started in the past 7 days. There has been no fever. Associated symptoms include congestion and coughing. Pertinent negatives include no abdominal pain, diarrhea, headaches, shortness of breath or vomiting.       Objective   Vital Signs:   /60   Pulse 88   Temp 98.1 °F (36.7 °C)   Ht 162.6 cm (64\")   Wt 60.3 kg (133 lb)   SpO2 96%   BMI 22.83 kg/m²     Body mass index is 22.83 kg/m².    Review of Systems   Constitutional:  Negative for fever.   HENT:  Positive for congestion and sore throat.    Respiratory:  Positive for cough. Negative for shortness of breath.    Cardiovascular:  Negative for chest pain.   Gastrointestinal:  Negative for abdominal pain, diarrhea, nausea and vomiting.   Neurological:  Negative for dizziness and headache.       Past History:  Medical History: has a past medical history of Acquired hypothyroidism, CKD (chronic kidney disease) stage 3, GFR 30-59 ml/min, Coronary arteriosclerosis, Esophageal stricture, Fibroids, Fracture, patella, GERD (gastroesophageal reflux disease), GERD (gastroesophageal reflux disease), Glaucoma, Heterotopic ossification of bone (2016), High risk medication use, History of gastric ulcer, HLD (hyperlipidemia), HTN (hypertension), Insomnia, Internal hemorrhoids, Megaloblastic anemia, MRSA (methicillin resistant Staphylococcus aureus), Osteoarthritis, shoulder, Osteopenia, Osteoporosis, Polymyalgia rheumatica, Pseudomonas infection (2014), Renal failure syndrome, Rotator cuff tear, left, Urinary " incontinence, and Vitamin D deficiency.   Surgical History: has a past surgical history that includes Cataract extraction (Bilateral); Hysterectomy; Carpal tunnel release (Left); Colonoscopy (2011); Replacement total knee (2003); Total elbow arthroplasty (Right, 09/12/2016); Skin graft (Right, 2014); Elbow Arthrotomy (Right, 04/17/2017); Upper gastrointestinal endoscopy (2013); Ulnar nerve transposition (Right, 2016); and Total abdominal hysterectomy w/ bilateral salpingoophorectomy (1982).   Family History: family history includes Heart failure in her mother; Lung cancer (age of onset: 63) in her father; Stroke (age of onset: 70) in her mother.   Social History: reports that she has never smoked. She has never been exposed to tobacco smoke. She has never used smokeless tobacco. She reports that she does not drink alcohol and does not use drugs.      Current Outpatient Medications:     aspirin 81 MG EC tablet, Take 1 tablet by mouth Daily., Disp: , Rfl:     cholecalciferol (VITAMIN D3) 1000 UNITS tablet, Take 2 tablets by mouth Daily., Disp: , Rfl:     Diclofenac Sodium (VOLTAREN) 1 % gel gel, Apply 1 g topically to the appropriate area as directed 3 (Three) Times a Day. For your thumb, Disp: 100 g, Rfl: 0    Doxylamine Succinate, Sleep, (SLEEP AID PO), Take  by mouth., Disp: , Rfl:     ferrous sulfate 325 (65 FE) MG tablet, Take 1 tablet by mouth Daily With Breakfast., Disp: , Rfl:     Hydrocod Kwadwo-Chlorphe Kwadwo ER (TUSSIONEX PENNKINETIC) 10-8 MG/5ML ER suspension, Take 5 mL by mouth Every 12 (Twelve) Hours As Needed for Cough., Disp: 60 mL, Rfl: 0    HYDROcodone-acetaminophen (NORCO) 5-325 MG per tablet, Take 2 tablets by mouth Every 6 (Six) Hours As Needed for Moderate Pain., Disp: 18 tablet, Rfl: 0    LORazepam (ATIVAN) 0.5 MG tablet, TAKE ONE TABLET BY MOUTH EVERY NIGHT AT BEDTIME, Disp: 30 tablet, Rfl: 1    Mirabegron ER (MYRBETRIQ) 50 MG tablet sustained-release 24 hour 24 hr tablet, Take 50 mg by mouth  Daily., Disp: , Rfl:     PILOCARPINE HCL PO, Take 5 mg by mouth 2 (Two) Times a Day., Disp: , Rfl:     raloxifene (EVISTA) 60 MG tablet, TAKE 1 TABLET BY MOUTH DAILY, Disp: 90 tablet, Rfl: 1    rosuvastatin (CRESTOR) 10 MG tablet, TAKE ONE TABLET BY MOUTH ONCE NIGHTLY, Disp: 90 tablet, Rfl: 3    senna (SENOKOT) 8.6 MG tablet, Take 1 tablet by mouth Daily., Disp: , Rfl:     amoxicillin (AMOXIL) 500 MG capsule, Take 1 capsule by mouth 2 (Two) Times a Day., Disp: 20 capsule, Rfl: 0  No current facility-administered medications for this visit.    Facility-Administered Medications Ordered in Other Visits:     bupivacaine PF (MARCAINE) 0.25 % injection, , , PRN, Sean Brown CRNA, 50 mg at 09/13/16 0759  Allergies: Phenergan [promethazine hcl]    Physical Exam  Constitutional:       Appearance: Normal appearance.   HENT:      Right Ear: Tympanic membrane normal.      Left Ear: Tympanic membrane normal.      Mouth/Throat:      Pharynx: Oropharynx is clear.      Comments: Mild erythema to posterior throat  Eyes:      Conjunctiva/sclera: Conjunctivae normal.      Pupils: Pupils are equal, round, and reactive to light.   Cardiovascular:      Rate and Rhythm: Normal rate and regular rhythm.      Heart sounds: Normal heart sounds.   Pulmonary:      Effort: Pulmonary effort is normal.      Breath sounds: Normal breath sounds.   Neurological:      Mental Status: She is oriented to person, place, and time.   Psychiatric:         Mood and Affect: Mood normal.         Behavior: Behavior normal.             Assessment and Plan   Diagnoses and all orders for this visit:    1. Sore throat (Primary)  -     Covid-19 + Flu A&B AG, Veritor  -     POC Rapid Strep A  -     Throat / Upper Respiratory Culture - Swab, Throat  Rapid strep, covid and flu testing negative; will send throat culture  2. Acute non-recurrent maxillary sinusitis  Patient feels is more of her sinus infection type symptoms with drainage and prefers to go ahead and  start on antibiotic; discussed possible side effects; encouraged good hydration and rest; rtc if not improving  Other orders  -     amoxicillin (AMOXIL) 500 MG capsule; Take 1 capsule by mouth 2 (Two) Times a Day.  Dispense: 20 capsule; Refill: 0            Follow Up   No follow-ups on file.  Patient was given instructions and counseling regarding her condition or for health maintenance advice. Please see specific information pulled into the AVS if appropriate.     Almita Neff PA-C   PROVIDER:[TOKEN:[[id number]],FOLLOWUP:[1 week]],PROVIDER:[TOKEN:[[id number]],FOLLOWUP:[1 week]] PROVIDER:[TOKEN:[[id number]],FOLLOWUP:[1 week]],PROVIDER:[TOKEN:[[id number]],FOLLOWUP:[1 week]],PROVIDER:[TOKEN:[[id number]],FOLLOWUP:[2 weeks]]

## 2024-03-19 LAB
BACTERIA SPEC RESP CULT: NORMAL
BACTERIA SPEC RESP CULT: NORMAL

## 2024-03-25 ENCOUNTER — OFFICE VISIT (OUTPATIENT)
Dept: FAMILY MEDICINE CLINIC | Facility: CLINIC | Age: 84
End: 2024-03-25
Payer: MEDICARE

## 2024-03-25 VITALS
DIASTOLIC BLOOD PRESSURE: 72 MMHG | OXYGEN SATURATION: 98 % | HEIGHT: 64 IN | BODY MASS INDEX: 22.71 KG/M2 | WEIGHT: 133 LBS | SYSTOLIC BLOOD PRESSURE: 112 MMHG | HEART RATE: 78 BPM

## 2024-03-25 DIAGNOSIS — J01.80 OTHER SUBACUTE SINUSITIS: Primary | ICD-10-CM

## 2024-03-25 DIAGNOSIS — R49.0 HOARSENESS: ICD-10-CM

## 2024-03-25 PROCEDURE — 1160F RVW MEDS BY RX/DR IN RCRD: CPT | Performed by: FAMILY MEDICINE

## 2024-03-25 PROCEDURE — 3078F DIAST BP <80 MM HG: CPT | Performed by: FAMILY MEDICINE

## 2024-03-25 PROCEDURE — 3074F SYST BP LT 130 MM HG: CPT | Performed by: FAMILY MEDICINE

## 2024-03-25 PROCEDURE — 1159F MED LIST DOCD IN RCRD: CPT | Performed by: FAMILY MEDICINE

## 2024-03-25 PROCEDURE — 99214 OFFICE O/P EST MOD 30 MIN: CPT | Performed by: FAMILY MEDICINE

## 2024-03-25 RX ORDER — CEFDINIR 300 MG/1
300 CAPSULE ORAL 2 TIMES DAILY
Qty: 20 CAPSULE | Refills: 0 | Status: SHIPPED | OUTPATIENT
Start: 2024-03-25

## 2024-03-25 NOTE — PROGRESS NOTES
Office Note     Name: Yanet Grove    : 1940     MRN: [id number]     Chief Complaint  Medication Problem (Vit d issues./)    Subjective     History of Present Illness:  Yanet Grove is a 83 y.o. female who presents today for for calcium issues, sinus infection that blowing green from nose (antibiotic she has 1 more pill), and lorazepam she takes as needed.  A neighbor thought her taking a Xanax 0.5 mg would be helpful.  She is going to a doctor on April 10    Review of Systems:   Review of Systems    Past Medical History:   Past Medical History:   Diagnosis Date    Acquired hypothyroidism     CKD (chronic kidney disease) stage 3, GFR 30-59 ml/min     Coronary arteriosclerosis     Esophageal stricture     Fibroids     Fracture, patella     2 SURGERIES    GERD (gastroesophageal reflux disease)     GERD (gastroesophageal reflux disease)     Glaucoma     Heterotopic ossification of bone 2016    Right Elbow    High risk medication use     DRUG THERAPY FINDING    History of gastric ulcer     HLD (hyperlipidemia)     HTN (hypertension)     Insomnia     Internal hemorrhoids     Megaloblastic anemia     MRSA (methicillin resistant Staphylococcus aureus)     Osteoarthritis, shoulder     Osteopenia     Osteoporosis     Polymyalgia rheumatica     Pseudomonas infection 2014    Right foot wound    Renal failure syndrome     Rotator cuff tear, left     Urinary incontinence     Vitamin D deficiency        Past Surgical History:   Past Surgical History:   Procedure Laterality Date    CARPAL TUNNEL RELEASE Left     CATARACT EXTRACTION Bilateral     COLONOSCOPY  2011    ELBOW ARTHROTOMY Right 2017    Procedure: Right elbow radical resection capsule soft tissue heterotopic bone with contracture release;  Surgeon: Trung Bonilla MD;  Location: [facility];  Service:     HYSTERECTOMY      TOTAL    REPLACEMENT TOTAL KNEE  2003    SKIN GRAFT Right 2014    Foot    TOTAL ABDOMINAL HYSTERECTOMY WITH SALPINGO  OOPHORECTOMY  1982    TOTAL ELBOW ARTHROPLASTY Right 09/12/2016    Procedure: TOTAL ELBOW ARTHROPLASTY; ULNAR NERVE TRANSPOSITION;  Surgeon: Trung Bonilla MD;  Location: [facility];  Service:     ULNAR NERVE TRANSPOSITION Right 2016    UPPER GASTROINTESTINAL ENDOSCOPY  2013       Family History:   Family History   Problem Relation Age of Onset    Heart failure Mother     Stroke Mother 70    Lung cancer Father 63       Social History:   Social History     Socioeconomic History    Marital status:    Tobacco Use    Smoking status: Never     Passive exposure: Never    Smokeless tobacco: Never   Vaping Use    Vaping status: Never Used   Substance and Sexual Activity    Alcohol use: No    Drug use: No    Sexual activity: Not Currently     Partners: Male     Comment:  x 58 years       Immunizations:   Immunization History   Administered Date(s) Administered    COVID-19 (MODERNA) 1st,2nd,3rd Dose Monovalent 03/02/2021, 03/30/2021, 11/17/2021    Pneumococcal Conjugate 13-Valent (PCV13) 04/10/2008, 04/26/2017    Td (TDVAX) 06/03/2004, 10/30/2015        Medications:     Current Outpatient Medications:     aspirin 81 MG EC tablet, Take 1 tablet by mouth Daily., Disp: , Rfl:     cholecalciferol (VITAMIN D3) 1000 UNITS tablet, Take 2 tablets by mouth Daily., Disp: , Rfl:     Diclofenac Sodium (VOLTAREN) 1 % gel gel, Apply 1 g topically to the appropriate area as directed 3 (Three) Times a Day. For your thumb, Disp: 100 g, Rfl: 0    Doxylamine Succinate, Sleep, (SLEEP AID PO), Take  by mouth., Disp: , Rfl:     ferrous sulfate 325 (65 FE) MG tablet, Take 1 tablet by mouth Daily With Breakfast., Disp: , Rfl:     Hydrocod Kwadwo-Chlorphe Kwadwo ER (TUSSIONEX PENNKINETIC) 10-8 MG/5ML ER suspension, Take 5 mL by mouth Every 12 (Twelve) Hours As Needed for Cough., Disp: 60 mL, Rfl: 0    HYDROcodone-acetaminophen (NORCO) 5-325 MG per tablet, Take 2 tablets by mouth Every 6 (Six) Hours As Needed for Moderate Pain., Disp:  "18 tablet, Rfl: 0    LORazepam (ATIVAN) 0.5 MG tablet, TAKE ONE TABLET BY MOUTH EVERY NIGHT AT BEDTIME, Disp: 30 tablet, Rfl: 1    Mirabegron ER (MYRBETRIQ) 50 MG tablet sustained-release 24 hour 24 hr tablet, Take 50 mg by mouth Daily., Disp: , Rfl:     PILOCARPINE HCL PO, Take 5 mg by mouth 2 (Two) Times a Day., Disp: , Rfl:     raloxifene (EVISTA) 60 MG tablet, TAKE 1 TABLET BY MOUTH DAILY, Disp: 90 tablet, Rfl: 1    rosuvastatin (CRESTOR) 10 MG tablet, TAKE ONE TABLET BY MOUTH ONCE NIGHTLY, Disp: 90 tablet, Rfl: 3    senna (SENOKOT) 8.6 MG tablet, Take 1 tablet by mouth Daily., Disp: , Rfl:     cefdinir (OMNICEF) 300 MG capsule, Take 1 capsule by mouth 2 (Two) Times a Day., Disp: 20 capsule, Rfl: 0  No current facility-administered medications for this visit.    Facility-Administered Medications Ordered in Other Visits:     bupivacaine PF (MARCAINE) 0.25 % injection, , , PRN, Sean Brown CRNA, 50 mg at 09/13/16 0759    Allergies:   Allergies   Allergen Reactions    Phenergan [Promethazine Hcl] Hives       Objective     Vital Signs  /72   Pulse 78   Ht 162.6 cm (64\")   Wt 60.3 kg (133 lb)   SpO2 98%   BMI 22.83 kg/m²   Estimated body mass index is 22.83 kg/m² as calculated from the following:    Height as of this encounter: 162.6 cm (64\").    Weight as of this encounter: 60.3 kg (133 lb).    BMI is within normal parameters. No other follow-up for BMI required.      Physical Exam  Constitutional:       General: She is not in acute distress.     Appearance: Normal appearance. She is obese. She is not toxic-appearing or diaphoretic.   HENT:      Head: Normocephalic and atraumatic.        Comments: Nontender throughout     Right Ear: External ear normal.      Left Ear: External ear normal.      Nose: Nose normal.   Eyes:      Pupils: Pupils are equal, round, and reactive to light.   Skin:     General: Skin is warm and dry.   Neurological:      Mental Status: She is alert.   Psychiatric:         Mood " and Affect: Mood normal.         Behavior: Behavior normal.          Procedures     Assessment and Plan     1. Other subacute sinusitis  She has had 10 days of 500 mg twice daily.  Amoxil.  Want to see if she quits that and she do not feel better in 2 days take Omnicef    2. Hoarseness  Dr. Dias will get onto that     3.  Vit d  would be 70 . It had been 96  Follow Up-----discussed with the pt. That she take lorazepam every night.  This time I spent with 30 minutes patient to patient contact and discussing around the meds  Return in about 3 months (around 6/25/2024).    Darin JASON PC [facility]  [address] [address] [phone]

## 2024-05-06 ENCOUNTER — OFFICE VISIT (OUTPATIENT)
Dept: FAMILY MEDICINE CLINIC | Facility: CLINIC | Age: 84
End: 2024-05-06
Payer: MEDICARE

## 2024-05-06 VITALS
BODY MASS INDEX: 23.05 KG/M2 | SYSTOLIC BLOOD PRESSURE: 146 MMHG | WEIGHT: 135 LBS | OXYGEN SATURATION: 97 % | DIASTOLIC BLOOD PRESSURE: 70 MMHG | HEART RATE: 81 BPM | HEIGHT: 64 IN

## 2024-05-06 DIAGNOSIS — M79.644 PAIN OF RIGHT THUMB: ICD-10-CM

## 2024-05-06 DIAGNOSIS — M79.604 RIGHT LEG PAIN: Primary | ICD-10-CM

## 2024-05-06 PROCEDURE — 3077F SYST BP >= 140 MM HG: CPT | Performed by: PHYSICIAN ASSISTANT

## 2024-05-06 PROCEDURE — 3078F DIAST BP <80 MM HG: CPT | Performed by: PHYSICIAN ASSISTANT

## 2024-05-06 PROCEDURE — 99213 OFFICE O/P EST LOW 20 MIN: CPT | Performed by: PHYSICIAN ASSISTANT

## 2024-06-10 DIAGNOSIS — F51.01 PRIMARY INSOMNIA: ICD-10-CM

## 2024-06-10 RX ORDER — LORAZEPAM 0.5 MG/1
0.5 TABLET ORAL
Qty: 30 TABLET | Refills: 2 | Status: SHIPPED | OUTPATIENT
Start: 2024-06-10

## 2024-06-21 ENCOUNTER — OFFICE VISIT (OUTPATIENT)
Dept: FAMILY MEDICINE CLINIC | Facility: CLINIC | Age: 84
End: 2024-06-21
Payer: MEDICARE

## 2024-06-21 VITALS
SYSTOLIC BLOOD PRESSURE: 137 MMHG | HEART RATE: 65 BPM | OXYGEN SATURATION: 95 % | DIASTOLIC BLOOD PRESSURE: 72 MMHG | BODY MASS INDEX: 23.39 KG/M2 | WEIGHT: 137 LBS | HEIGHT: 64 IN

## 2024-06-21 DIAGNOSIS — N18.30 STAGE 3 CHRONIC KIDNEY DISEASE, UNSPECIFIED WHETHER STAGE 3A OR 3B CKD: ICD-10-CM

## 2024-06-21 DIAGNOSIS — F51.01 PRIMARY INSOMNIA: Primary | ICD-10-CM

## 2024-06-21 DIAGNOSIS — M35.3 POLYMYALGIA RHEUMATICA: ICD-10-CM

## 2024-06-21 DIAGNOSIS — I10 PRIMARY HYPERTENSION: ICD-10-CM

## 2024-06-21 PROCEDURE — 1159F MED LIST DOCD IN RCRD: CPT | Performed by: FAMILY MEDICINE

## 2024-06-21 PROCEDURE — 1160F RVW MEDS BY RX/DR IN RCRD: CPT | Performed by: FAMILY MEDICINE

## 2024-06-21 PROCEDURE — 1126F AMNT PAIN NOTED NONE PRSNT: CPT | Performed by: FAMILY MEDICINE

## 2024-06-21 PROCEDURE — 3078F DIAST BP <80 MM HG: CPT | Performed by: FAMILY MEDICINE

## 2024-06-21 PROCEDURE — 3075F SYST BP GE 130 - 139MM HG: CPT | Performed by: FAMILY MEDICINE

## 2024-06-21 PROCEDURE — 99214 OFFICE O/P EST MOD 30 MIN: CPT | Performed by: FAMILY MEDICINE

## 2024-06-21 NOTE — PROGRESS NOTES
Office Note     Name: Yanet Grove    : 1940     MRN: [id number]     Chief Complaint  Insomnia (Having issues sleeping)    Subjective     History of Present Illness:  Yanet Grove is a 83 y.o. female who presents today for insomnia.  She used to get to sleep taking doxylamine alternating with Ativan 0.5 mg.  She had not taken send them together.  I want her to stay awake till midnight take the Ativan 0.5 mg with a whole doxylamine she opted for half a doxylamine    Review of Systems:   Review of Systems    Past Medical History:   Past Medical History:   Diagnosis Date    Acquired hypothyroidism     CKD (chronic kidney disease) stage 3, GFR 30-59 ml/min     Coronary arteriosclerosis     Esophageal stricture     Fibroids     Fracture, patella     2 SURGERIES    GERD (gastroesophageal reflux disease)     GERD (gastroesophageal reflux disease)     Glaucoma     Heterotopic ossification of bone 2016    Right Elbow    High risk medication use     DRUG THERAPY FINDING    History of gastric ulcer     HLD (hyperlipidemia)     HTN (hypertension)     Insomnia     Internal hemorrhoids     Megaloblastic anemia     MRSA (methicillin resistant Staphylococcus aureus)     Osteoarthritis, shoulder     Osteopenia     Osteoporosis     Polymyalgia rheumatica     Pseudomonas infection 2014    Right foot wound    Renal failure syndrome     Rotator cuff tear, left     Urinary incontinence     Vitamin D deficiency        Past Surgical History:   Past Surgical History:   Procedure Laterality Date    CARPAL TUNNEL RELEASE Left     CATARACT EXTRACTION Bilateral     COLONOSCOPY  2011    ELBOW ARTHROTOMY Right 2017    Procedure: Right elbow radical resection capsule soft tissue heterotopic bone with contracture release;  Surgeon: Trung Bonilla MD;  Location: [facility];  Service:     HYSTERECTOMY      TOTAL    REPLACEMENT TOTAL KNEE  2003    SKIN GRAFT Right 2014    Foot    TOTAL ABDOMINAL HYSTERECTOMY WITH  SALPINGO OOPHORECTOMY  1982    TOTAL ELBOW ARTHROPLASTY Right 09/12/2016    Procedure: TOTAL ELBOW ARTHROPLASTY; ULNAR NERVE TRANSPOSITION;  Surgeon: Trung Bonilla MD;  Location: [facility];  Service:     ULNAR NERVE TRANSPOSITION Right 2016    UPPER GASTROINTESTINAL ENDOSCOPY  2013       Family History:   Family History   Problem Relation Age of Onset    Heart failure Mother     Stroke Mother 70    Lung cancer Father 63       Social History:   Social History     Socioeconomic History    Marital status:    Tobacco Use    Smoking status: Never     Passive exposure: Never    Smokeless tobacco: Never   Vaping Use    Vaping status: Never Used   Substance and Sexual Activity    Alcohol use: No    Drug use: No    Sexual activity: Not Currently     Partners: Male     Comment:  x 58 years       Immunizations:   Immunization History   Administered Date(s) Administered    COVID-19 (MODERNA) 1st,2nd,3rd Dose Monovalent 03/02/2021, 03/30/2021, 11/17/2021    Pneumococcal Conjugate 13-Valent (PCV13) 04/10/2008, 04/26/2017    Td (TDVAX) 06/03/2004, 10/30/2015        Medications:     Current Outpatient Medications:     aspirin 81 MG EC tablet, Take 1 tablet by mouth Daily., Disp: , Rfl:     cholecalciferol (VITAMIN D3) 1000 UNITS tablet, Take 2 tablets by mouth Daily., Disp: , Rfl:     Diclofenac Sodium (VOLTAREN) 1 % gel gel, Apply 1 g topically to the appropriate area as directed 3 (Three) Times a Day. For your thumb, Disp: 100 g, Rfl: 0    Doxylamine Succinate, Sleep, (SLEEP AID PO), Take  by mouth., Disp: , Rfl:     ferrous sulfate 325 (65 FE) MG tablet, Take 1 tablet by mouth Daily With Breakfast., Disp: , Rfl:     Hydrocod Kwadwo-Chlorphe Kwadwo ER (TUSSIONEX PENNKINETIC) 10-8 MG/5ML ER suspension, Take 5 mL by mouth Every 12 (Twelve) Hours As Needed for Cough., Disp: 60 mL, Rfl: 0    HYDROcodone-acetaminophen (NORCO) 5-325 MG per tablet, Take 2 tablets by mouth Every 6 (Six) Hours As Needed for Moderate  "Pain., Disp: 18 tablet, Rfl: 0    LORazepam (ATIVAN) 0.5 MG tablet, TAKE 1 TABLET BY MOUTH EVERY NIGHT AT BEDTIME, Disp: 30 tablet, Rfl: 2    Mirabegron ER (MYRBETRIQ) 50 MG tablet sustained-release 24 hour 24 hr tablet, Take 50 mg by mouth Daily., Disp: , Rfl:     PILOCARPINE HCL PO, Take 5 mg by mouth 2 (Two) Times a Day., Disp: , Rfl:     raloxifene (EVISTA) 60 MG tablet, TAKE 1 TABLET BY MOUTH DAILY, Disp: 90 tablet, Rfl: 1    rosuvastatin (CRESTOR) 10 MG tablet, TAKE ONE TABLET BY MOUTH ONCE NIGHTLY, Disp: 90 tablet, Rfl: 3    senna (SENOKOT) 8.6 MG tablet, Take 1 tablet by mouth Daily., Disp: , Rfl:   No current facility-administered medications for this visit.    Facility-Administered Medications Ordered in Other Visits:     bupivacaine PF (MARCAINE) 0.25 % injection, , , PRN, Sean Brown CRNA, 50 mg at 09/13/16 0759    Allergies:   Allergies   Allergen Reactions    Phenergan [Promethazine Hcl] Hives       Objective     Vital Signs  /72   Pulse 65   Ht 162.6 cm (64\")   Wt 62.1 kg (137 lb)   SpO2 95%   BMI 23.52 kg/m²   Estimated body mass index is 23.52 kg/m² as calculated from the following:    Height as of this encounter: 162.6 cm (64\").    Weight as of this encounter: 62.1 kg (137 lb).    BMI is within normal parameters. No other follow-up for BMI required.      Physical Exam  Constitutional:       General: She is not in acute distress.     Appearance: Normal appearance. She is normal weight. She is not toxic-appearing or diaphoretic.   HENT:      Head: Normocephalic and atraumatic.      Right Ear: External ear normal.      Left Ear: External ear normal.      Nose: Nose normal.   Eyes:      Pupils: Pupils are equal, round, and reactive to light.   Skin:     General: Skin is warm and dry.   Neurological:      Mental Status: She is alert.   Psychiatric:         Mood and Affect: Mood normal.         Behavior: Behavior normal.          Procedures     Assessment and Plan     1. Primary " insomnia  0.5 mg oral lorazepam plus half or whole pill of doxylamine    2. Polymyalgia rheumatica  In past    3. Stage 3 chronic kidney disease, unspecified whether stage 3a or 3b CKD  Got it to be once a year    4. Primary hypertension  Got it to be once a year.  She told to good half an hour.       Follow Up  Return in about 3 months (around 9/21/2024).    Darin JASON PC [facility]  [address] [facility] [address] [phone]

## 2024-07-08 ENCOUNTER — TELEPHONE (OUTPATIENT)
Dept: CARDIOLOGY | Facility: CLINIC | Age: 84
End: 2024-07-08
Payer: MEDICARE

## 2024-07-08 NOTE — TELEPHONE ENCOUNTER
Caller: Yanet Grove Tavarez    Relationship to patient: Self    Best call back number: [phone]    Chief complaint: PATIENT WILL NOT BE ABLE TO MAKE HER APPOINTMENT ON 08.15.24. SHE WOULD LIKE TO RESCHEDULE. PER HUB WORKFLOW, FURTHER DIRECTION IS REQUIRED. PLEASE REACH OUT TO SCHEDULE PATIENT    Type of visit: FOLLOW UP     Requested date: ASAP    If rescheduling, when is the original appointment: 08.15.24    Additional notes:PATIENT HAS NE NEW MEDICAL CONCERNS AT THIS TIME.

## 2024-07-09 ENCOUNTER — TELEPHONE (OUTPATIENT)
Dept: FAMILY MEDICINE CLINIC | Facility: CLINIC | Age: 84
End: 2024-07-09
Payer: MEDICARE

## 2024-07-09 NOTE — TELEPHONE ENCOUNTER
Patient came by and wanted Dr. Baugh to know that she is sleeping better. She also states that she is using your sleeping pill (Lorazepam) and one half of her little pill that is otc.

## 2024-08-01 ENCOUNTER — OFFICE VISIT (OUTPATIENT)
Dept: CARDIOLOGY | Facility: CLINIC | Age: 84
End: 2024-08-01
Payer: MEDICARE

## 2024-08-01 VITALS
HEART RATE: 77 BPM | SYSTOLIC BLOOD PRESSURE: 132 MMHG | HEIGHT: 63 IN | WEIGHT: 140.2 LBS | BODY MASS INDEX: 24.84 KG/M2 | DIASTOLIC BLOOD PRESSURE: 60 MMHG | OXYGEN SATURATION: 98 %

## 2024-08-01 DIAGNOSIS — I10 PRIMARY HYPERTENSION: Primary | ICD-10-CM

## 2024-08-01 DIAGNOSIS — E78.2 MIXED HYPERLIPIDEMIA: ICD-10-CM

## 2024-08-01 PROCEDURE — 3078F DIAST BP <80 MM HG: CPT | Performed by: INTERNAL MEDICINE

## 2024-08-01 PROCEDURE — 99213 OFFICE O/P EST LOW 20 MIN: CPT | Performed by: INTERNAL MEDICINE

## 2024-08-01 PROCEDURE — 3075F SYST BP GE 130 - 139MM HG: CPT | Performed by: INTERNAL MEDICINE

## 2024-08-01 PROCEDURE — 93000 ELECTROCARDIOGRAM COMPLETE: CPT | Performed by: INTERNAL MEDICINE

## 2024-08-01 NOTE — PROGRESS NOTES
[facility]  Office Progress Note  Yanet Grove  1940  [address] [place] [address]       Visit Date: 08/01/24    PCP: Darin Baugh MD  [address]  [place] [address]    IDENTIFICATION: A 84 y.o. female  2018 from [facility].     PROBLEM LIST:   chest pain   LHC 2006, non-obstructive disease, LV 60% per Rukavina.  Echo 2006 wnl with mild aortic sclerosis, mild mr, mild tr  HLD  5/12: , TG 98, HDL 52,   8/23  [id number]  Glaucoma  followed per Dixie.  GERD   with positive h.pylori per Dr. Ivan 2010  CKD 3- 1.28 20[facility]  Polymyalgia rheumatica  Infection with continued abx as of 7/17- Filippo ID  Surgical Hx:  Knee replacement  Elbow fracture s/p ORIF 2016, sajadi   Right femur fracture spontaneous-repair [facility]        CC:   Chief Complaint   Patient presents with    Coronary artery disease involving native coronary artery of       Allergies  Allergies   Allergen Reactions    Phenergan [Promethazine Hcl] Hives       Current Medications  Current Outpatient Medications   Medication Instructions    aspirin 81 mg, Oral, Daily    cholecalciferol (VITAMIN D3) 2,000 Units, Oral, Daily    Diclofenac Sodium (VOLTAREN) 1 g, Topical, 3 Times Daily, For your thumb    Doxylamine Succinate, Sleep, (SLEEP AID PO) Oral    ferrous sulfate 325 mg, Oral, Daily With Breakfast    Hydrocod Kwadwo-Chlorphe Kwadwo ER (TUSSIONEX PENNKINETIC) 10-8 MG/5ML ER suspension 5 mL, Oral, Every 12 Hours PRN    HYDROcodone-acetaminophen (NORCO) 5-325 MG per tablet 2 tablets, Oral, Every 6 Hours PRN    LORazepam (ATIVAN) 0.5 mg, Oral, Every Night at Bedtime    Mirabegron ER (MYRBETRIQ) 50 mg, Oral, Daily    PILOCARPINE HCL PO 5 mg, Oral, 2 Times Daily    raloxifene (EVISTA) 60 mg, Oral, Daily    rosuvastatin (CRESTOR) 10 mg, Oral, Nightly    senna (SENOKOT) 8.6 MG tablet 1 tablet, Oral, Daily        History of Present Illness   [facility]  "Perfecto is a 84 y.o. year old female here for follow up.  Had spontaneous right femur fracture while walking into [facility] in October and had repair in [place].  She has had some mild elevation of blood pressure at home monitor when checked today did not correlate to our monitoring      OBJECTIVE:  Vitals:    08/01/24 1316   BP: 132/60   Pulse: 77   SpO2: 98%   Weight: 63.6 kg (140 lb 3.2 oz)   Height: 160 cm (63\")     Body mass index is 24.84 kg/m².    Constitutional:       Appearance: Healthy appearance. Not in distress.   Neck:      Vascular: No JVR. JVD normal.   Pulmonary:      Effort: Pulmonary effort is normal.      Breath sounds: Normal breath sounds. No wheezing. No rhonchi. No rales.   Chest:      Chest wall: Not tender to palpatation.   Cardiovascular:      PMI at left midclavicular line. Normal rate. Regular rhythm. Normal S1. Normal S2.       Murmurs: There is no murmur.      No gallop.  No click. No rub.   Pulses:     Intact distal pulses.   Edema:     Peripheral edema absent.   Abdominal:      General: Bowel sounds are normal.      Palpations: Abdomen is soft.      Tenderness: There is no abdominal tenderness.   Musculoskeletal: Normal range of motion.         General: No tenderness. Skin:     General: Skin is warm and dry.   Neurological:      General: No focal deficit present.      Mental Status: Alert and oriented to person, place and time.         Diagnostic Data:  Lab Results   Component Value Date    CHOL 183 03/08/2018    CHLPL 142 08/02/2023    TRIG 90 08/02/2023    HDL 53 08/02/2023    LDL 72 08/02/2023      Lab Results   Component Value Date    GLUCOSE 106 (H) 08/02/2023    BUN 11 08/02/2023    CREATININE 1.28 (H) 08/02/2023    EGFRRESULT 42 (L) 08/02/2023    EGFR 59 09/15/2014    BCR 9 (L) 08/02/2023    K 4.4 08/02/2023    CO2 25 08/02/2023    CALCIUM 9.2 08/02/2023    PROTENTOTREF 6.1 08/02/2023    ALBUMIN 4.0 08/02/2023    BILITOT 1.4 (H) 08/02/2023    AST 15 08/02/2023    ALT 6 " 08/02/2023      Lab Results   Component Value Date    WBC 6.3 08/02/2023    HGB 11.0 (L) 08/02/2023    HCT 35.0 08/02/2023    MCV 92 08/02/2023     08/02/2023      Lab Results   Component Value Date    TSH 5.380 (H) 08/02/2023        ECG 12 Lead    Date/Time: 8/1/2024 1:54 PM  Performed by: Darin Lee MD    Authorized by: Darin Lee MD  Comparison: compared with previous ECG from 7/21/2022  Similar to previous ECG  Rhythm: sinus rhythm  BPM: 71    Clinical impression: normal ECG          Advance Care Planning   ACP discussion was held with the patient during this visit. Patient has an advance directive (not in EMR), copy requested.         ASSESSMENT:   Diagnosis Plan   1. Primary hypertension        2. Mixed hyperlipidemia            PLAN:  Hypertension she will obtain a home blood pressure monitor.  She does have amlodipine 2.5 it was recommended per nephrology    Mixed dyslipidemia controlled on statin therapy        Darin Lee MD, [facility]

## 2024-08-12 RX ORDER — RALOXIFENE HYDROCHLORIDE 60 MG/1
60 TABLET, FILM COATED ORAL DAILY
Qty: 90 TABLET | Refills: 0 | Status: SHIPPED | OUTPATIENT
Start: 2024-08-12

## 2024-09-16 DIAGNOSIS — F51.01 PRIMARY INSOMNIA: ICD-10-CM

## 2024-09-16 RX ORDER — LORAZEPAM 0.5 MG/1
0.5 TABLET ORAL
Qty: 30 TABLET | Refills: 2 | Status: SHIPPED | OUTPATIENT
Start: 2024-09-16

## 2024-09-23 ENCOUNTER — OFFICE VISIT (OUTPATIENT)
Dept: FAMILY MEDICINE CLINIC | Facility: CLINIC | Age: 84
End: 2024-09-23
Payer: MEDICARE

## 2024-09-23 VITALS
HEART RATE: 85 BPM | BODY MASS INDEX: 25.16 KG/M2 | SYSTOLIC BLOOD PRESSURE: 126 MMHG | DIASTOLIC BLOOD PRESSURE: 72 MMHG | HEIGHT: 63 IN | WEIGHT: 142 LBS | OXYGEN SATURATION: 97 %

## 2024-09-23 DIAGNOSIS — D50.8 OTHER IRON DEFICIENCY ANEMIA: ICD-10-CM

## 2024-09-23 DIAGNOSIS — Z87.39 H/O POLYMYALGIA RHEUMATICA: ICD-10-CM

## 2024-09-23 DIAGNOSIS — I10 PRIMARY HYPERTENSION: ICD-10-CM

## 2024-09-23 DIAGNOSIS — M81.0 AGE-RELATED OSTEOPOROSIS WITHOUT CURRENT PATHOLOGICAL FRACTURE: ICD-10-CM

## 2024-09-23 DIAGNOSIS — R53.83 OTHER FATIGUE: ICD-10-CM

## 2024-09-23 DIAGNOSIS — N18.30 STAGE 3 CHRONIC KIDNEY DISEASE, UNSPECIFIED WHETHER STAGE 3A OR 3B CKD: ICD-10-CM

## 2024-09-23 DIAGNOSIS — E03.9 ACQUIRED HYPOTHYROIDISM: ICD-10-CM

## 2024-09-23 DIAGNOSIS — E78.5 DYSLIPIDEMIA: Primary | ICD-10-CM

## 2024-09-23 DIAGNOSIS — D53.1 MEGALOBLASTIC ANEMIA: ICD-10-CM

## 2024-09-23 PROCEDURE — 1160F RVW MEDS BY RX/DR IN RCRD: CPT | Performed by: FAMILY MEDICINE

## 2024-09-23 PROCEDURE — 1159F MED LIST DOCD IN RCRD: CPT | Performed by: FAMILY MEDICINE

## 2024-09-23 PROCEDURE — 3074F SYST BP LT 130 MM HG: CPT | Performed by: FAMILY MEDICINE

## 2024-09-23 PROCEDURE — 3078F DIAST BP <80 MM HG: CPT | Performed by: FAMILY MEDICINE

## 2024-09-23 PROCEDURE — 1126F AMNT PAIN NOTED NONE PRSNT: CPT | Performed by: FAMILY MEDICINE

## 2024-09-23 PROCEDURE — 99214 OFFICE O/P EST MOD 30 MIN: CPT | Performed by: FAMILY MEDICINE

## 2024-09-25 ENCOUNTER — TELEPHONE (OUTPATIENT)
Dept: FAMILY MEDICINE CLINIC | Facility: CLINIC | Age: 84
End: 2024-09-25
Payer: MEDICARE

## 2024-09-25 LAB
ALBUMIN SERPL-MCNC: 4.3 G/DL (ref 3.7–4.7)
ALP SERPL-CCNC: 58 IU/L (ref 44–121)
ALT SERPL-CCNC: 13 IU/L (ref 0–32)
AST SERPL-CCNC: 22 IU/L (ref 0–40)
BASOPHILS # BLD AUTO: 0.1 X10E3/UL (ref 0–0.2)
BASOPHILS NFR BLD AUTO: 1 %
BILIRUB SERPL-MCNC: 1 MG/DL (ref 0–1.2)
BUN SERPL-MCNC: 21 MG/DL (ref 8–27)
BUN/CREAT SERPL: 16 (ref 12–28)
CALCIUM SERPL-MCNC: 9.7 MG/DL (ref 8.7–10.3)
CHLORIDE SERPL-SCNC: 104 MMOL/L (ref 96–106)
CHOLEST SERPL-MCNC: 156 MG/DL (ref 100–199)
CO2 SERPL-SCNC: 21 MMOL/L (ref 20–29)
CREAT SERPL-MCNC: 1.32 MG/DL (ref 0.57–1)
EGFRCR SERPLBLD CKD-EPI 2021: 40 ML/MIN/1.73
EOSINOPHIL # BLD AUTO: 0.3 X10E3/UL (ref 0–0.4)
EOSINOPHIL NFR BLD AUTO: 3 %
ERYTHROCYTE [DISTWIDTH] IN BLOOD BY AUTOMATED COUNT: 12.5 % (ref 11.7–15.4)
FERRITIN SERPL-MCNC: 276 NG/ML (ref 15–150)
FOLATE SERPL-MCNC: 10.3 NG/ML
GLOBULIN SER CALC-MCNC: 2.3 G/DL (ref 1.5–4.5)
GLUCOSE SERPL-MCNC: 97 MG/DL (ref 70–99)
HCT VFR BLD AUTO: 37.4 % (ref 34–46.6)
HDLC SERPL-MCNC: 71 MG/DL
HGB BLD-MCNC: 11.9 G/DL (ref 11.1–15.9)
IMM GRANULOCYTES # BLD AUTO: 0 X10E3/UL (ref 0–0.1)
IMM GRANULOCYTES NFR BLD AUTO: 0 %
IRON SATN MFR SERPL: 15 % (ref 15–55)
IRON SERPL-MCNC: 45 UG/DL (ref 27–139)
LDLC SERPL CALC-MCNC: 68 MG/DL (ref 0–99)
LYMPHOCYTES # BLD AUTO: 1.8 X10E3/UL (ref 0.7–3.1)
LYMPHOCYTES NFR BLD AUTO: 17 %
MCH RBC QN AUTO: 30.4 PG (ref 26.6–33)
MCHC RBC AUTO-ENTMCNC: 31.8 G/DL (ref 31.5–35.7)
MCV RBC AUTO: 96 FL (ref 79–97)
MONOCYTES # BLD AUTO: 0.9 X10E3/UL (ref 0.1–0.9)
MONOCYTES NFR BLD AUTO: 8 %
NEUTROPHILS # BLD AUTO: 7.4 X10E3/UL (ref 1.4–7)
NEUTROPHILS NFR BLD AUTO: 71 %
PLATELET # BLD AUTO: 264 X10E3/UL (ref 150–450)
POTASSIUM SERPL-SCNC: 4.7 MMOL/L (ref 3.5–5.2)
PROT SERPL-MCNC: 6.6 G/DL (ref 6–8.5)
RBC # BLD AUTO: 3.91 X10E6/UL (ref 3.77–5.28)
SODIUM SERPL-SCNC: 141 MMOL/L (ref 134–144)
T4 FREE SERPL-MCNC: 0.97 NG/DL (ref 0.82–1.77)
TIBC SERPL-MCNC: 300 UG/DL (ref 250–450)
TRIGL SERPL-MCNC: 95 MG/DL (ref 0–149)
TSH SERPL DL<=0.005 MIU/L-ACNC: 3.61 UIU/ML (ref 0.45–4.5)
UIBC SERPL-MCNC: 255 UG/DL (ref 118–369)
VIT B12 SERPL-MCNC: 418 PG/ML (ref 232–1245)
VLDLC SERPL CALC-MCNC: 17 MG/DL (ref 5–40)
WBC # BLD AUTO: 10.5 X10E3/UL (ref 3.4–10.8)

## 2024-09-25 RX ORDER — HYDROCODONE POLISTIREX AND CHLORPHENIRAMINE POLISTIREX 10; 8 MG/5ML; MG/5ML
5 SUSPENSION, EXTENDED RELEASE ORAL EVERY 12 HOURS PRN
Qty: 60 ML | Refills: 0 | Status: SHIPPED | OUTPATIENT
Start: 2024-09-25

## 2024-10-02 ENCOUNTER — OFFICE VISIT (OUTPATIENT)
Dept: FAMILY MEDICINE CLINIC | Facility: CLINIC | Age: 84
End: 2024-10-02
Payer: MEDICARE

## 2024-10-02 VITALS
HEIGHT: 63 IN | HEART RATE: 84 BPM | OXYGEN SATURATION: 98 % | BODY MASS INDEX: 25.52 KG/M2 | DIASTOLIC BLOOD PRESSURE: 70 MMHG | WEIGHT: 144 LBS | SYSTOLIC BLOOD PRESSURE: 118 MMHG

## 2024-10-02 DIAGNOSIS — S90.221A SUBUNGUAL HEMATOMA OF TOENAIL OF RIGHT FOOT, INITIAL ENCOUNTER: Primary | ICD-10-CM

## 2024-10-02 DIAGNOSIS — E78.5 DYSLIPIDEMIA: ICD-10-CM

## 2024-10-02 PROCEDURE — 3074F SYST BP LT 130 MM HG: CPT | Performed by: FAMILY MEDICINE

## 2024-10-02 PROCEDURE — 1126F AMNT PAIN NOTED NONE PRSNT: CPT | Performed by: FAMILY MEDICINE

## 2024-10-02 PROCEDURE — 99213 OFFICE O/P EST LOW 20 MIN: CPT | Performed by: FAMILY MEDICINE

## 2024-10-02 PROCEDURE — 3078F DIAST BP <80 MM HG: CPT | Performed by: FAMILY MEDICINE

## 2024-10-02 PROCEDURE — 1159F MED LIST DOCD IN RCRD: CPT | Performed by: FAMILY MEDICINE

## 2024-10-02 PROCEDURE — 1160F RVW MEDS BY RX/DR IN RCRD: CPT | Performed by: FAMILY MEDICINE

## 2024-10-02 NOTE — PROGRESS NOTES
Office Note     Name: Yanet Grove    : 1940     MRN: [id number]     Chief Complaint  Nail Problem (Bruised right big toe./X3 days)    Subjective     History of Present Illness:  Yanet Grove is a 84 y.o. female who presents today for nail problem.  Patient ablated 2 months ago painted over that.  Of fungus?  And nail hematoma triangular-shaped    Review of Systems:   Review of Systems    Past Medical History:   Past Medical History:   Diagnosis Date    Acquired hypothyroidism     CKD (chronic kidney disease) stage 3, GFR 30-59 ml/min     Coronary arteriosclerosis     Esophageal stricture     Fibroids     Fracture, patella     2 SURGERIES    GERD (gastroesophageal reflux disease)     GERD (gastroesophageal reflux disease)     Glaucoma     Heterotopic ossification of bone 2016    Right Elbow    High risk medication use     DRUG THERAPY FINDING    History of gastric ulcer     HLD (hyperlipidemia)     HTN (hypertension)     Insomnia     Internal hemorrhoids     Megaloblastic anemia     MRSA (methicillin resistant Staphylococcus aureus)     Osteoarthritis, shoulder     Osteopenia     Osteoporosis     Polymyalgia rheumatica     Pseudomonas infection 2014    Right foot wound    Renal failure syndrome     Rotator cuff tear, left     Urinary incontinence     Vitamin D deficiency        Past Surgical History:   Past Surgical History:   Procedure Laterality Date    CARPAL TUNNEL RELEASE Left     CATARACT EXTRACTION Bilateral     COLONOSCOPY  2011    ELBOW ARTHROTOMY Right 2017    Procedure: Right elbow radical resection capsule soft tissue heterotopic bone with contracture release;  Surgeon: Trung Bonilla MD;  Location: [facility];  Service:     HYSTERECTOMY      TOTAL    REPLACEMENT TOTAL KNEE  2003    SKIN GRAFT Right 2014    Foot    TOTAL ABDOMINAL HYSTERECTOMY WITH SALPINGO OOPHORECTOMY  1982    TOTAL ELBOW ARTHROPLASTY Right 2016    Procedure: TOTAL ELBOW ARTHROPLASTY; ULNAR  NERVE TRANSPOSITION;  Surgeon: Trung Bonilla MD;  [facility];  Service:     ULNAR NERVE TRANSPOSITION Right 2016    UPPER GASTROINTESTINAL ENDOSCOPY  2013       Family History:   Family History   Problem Relation Age of Onset    Heart failure Mother     Stroke Mother 70    Lung cancer Father 63       Social History:   Social History     Socioeconomic History    Marital status:    Tobacco Use    Smoking status: Never     Passive exposure: Never    Smokeless tobacco: Never   Vaping Use    Vaping status: Never Used   Substance and Sexual Activity    Alcohol use: No    Drug use: No    Sexual activity: Not Currently     Partners: Male     Comment:  x 58 years       Immunizations:   Immunization History   Administered Date(s) Administered    COVID-19 (MODERNA) 1st,2nd,3rd Dose Monovalent 03/02/2021, 03/30/2021, 11/17/2021    Pneumococcal Conjugate 13-Valent (PCV13) 04/10/2008, 04/26/2017    Td (TDVAX) 06/03/2004, 10/30/2015        Medications:     Current Outpatient Medications:     aspirin 81 MG EC tablet, Take 2 tablets by mouth Daily., Disp: , Rfl:     cholecalciferol (VITAMIN D3) 1000 UNITS tablet, Take 2 tablets by mouth Daily., Disp: , Rfl:     Diclofenac Sodium (VOLTAREN) 1 % gel gel, Apply 1 g topically to the appropriate area as directed 3 (Three) Times a Day. For your thumb, Disp: 100 g, Rfl: 0    Doxylamine Succinate, Sleep, (SLEEP AID PO), Take  by mouth., Disp: , Rfl:     LORazepam (ATIVAN) 0.5 MG tablet, TAKE 1 TABLET BY MOUTH EVERY NIGHT AT BEDTIME, Disp: 30 tablet, Rfl: 2    Mirabegron ER (MYRBETRIQ) 50 MG tablet sustained-release 24 hour 24 hr tablet, Take 50 mg by mouth Daily., Disp: , Rfl:     PILOCARPINE HCL PO, Take 5 mg by mouth 2 (Two) Times a Day., Disp: , Rfl:     raloxifene (EVISTA) 60 MG tablet, TAKE 1 TABLET BY MOUTH DAILY, Disp: 90 tablet, Rfl: 0    rosuvastatin (CRESTOR) 10 MG tablet, TAKE ONE TABLET BY MOUTH ONCE NIGHTLY, Disp: 90 tablet, Rfl: 3    senna  "(SENOKOT) 8.6 MG tablet, Take 1 tablet by mouth Daily., Disp: , Rfl:   No current facility-administered medications for this visit.    Facility-Administered Medications Ordered in Other Visits:     bupivacaine PF (MARCAINE) 0.25 % injection, , , PRN, Sean Brown, CRNA, 50 mg at 09/13/16 0759    Allergies:   Allergies   Allergen Reactions    Phenergan [Promethazine Hcl] Hives       Objective     Vital Signs  /70   Pulse 84   Ht 160 cm (63\")   Wt 65.3 kg (144 lb)   SpO2 98%   BMI 25.51 kg/m²   Estimated body mass index is 25.51 kg/m² as calculated from the following:    Height as of this encounter: 160 cm (63\").    Weight as of this encounter: 65.3 kg (144 lb).            Physical Exam  Constitutional:       General: She is not in acute distress.     Appearance: Normal appearance. She is obese. She is not toxic-appearing or diaphoretic.   HENT:      Head: Normocephalic and atraumatic.      Right Ear: External ear normal.      Left Ear: External ear normal.      Nose: Nose normal.   Eyes:      Pupils: Pupils are equal, round, and reactive to light.   Musculoskeletal:        Feet:    Skin:     General: Skin is warm and dry.   Neurological:      Mental Status: She is alert.   Psychiatric:         Mood and Affect: Mood normal.         Behavior: Behavior normal.          Procedures     Assessment and Plan     1. Subungual hematoma of toenail of right foot, initial encounter  I reassured her that it was a hematoma, was not in fungus    2. Dyslipidemia  Fasting blood sugar       Follow Up  Return if symptoms worsen or fail to improve.    @me   [facility]  [address] [place] [id number] [phone]  "

## 2024-10-03 ENCOUNTER — TELEPHONE (OUTPATIENT)
Dept: FAMILY MEDICINE CLINIC | Facility: CLINIC | Age: 84
End: 2024-10-03
Payer: MEDICARE

## 2024-10-04 NOTE — TELEPHONE ENCOUNTER
Pt contacted and we discussed her lab results that was mailed to her.  Pt was told to stop taking ferrous sulfate. Pt has appt to urology 12/2024  Pt will call and make appt with pcp for march when closer to time.    Glucose was taking at time of draw and she was pleased.

## 2024-11-08 ENCOUNTER — TELEPHONE (OUTPATIENT)
Dept: FAMILY MEDICINE CLINIC | Facility: CLINIC | Age: 84
End: 2024-11-08
Payer: MEDICARE

## 2024-11-08 NOTE — TELEPHONE ENCOUNTER
Incoming Refill Request      Medication requested (name and dose):   DICLOFENAC GEL    [facility] where request [facility] be sent[facility]    Additional details provided by patient:   NONE    Best call back number:   [phone]    Does the patient have less than a 3 day supply:  [] Yes  [x] No    Christina Tapia Rep  11/08/24, 13:47 EST

## 2024-11-11 RX ORDER — RALOXIFENE HYDROCHLORIDE 60 MG/1
60 TABLET, FILM COATED ORAL DAILY
Qty: 90 TABLET | Refills: 0 | Status: SHIPPED | OUTPATIENT
Start: 2024-11-11

## 2024-11-13 ENCOUNTER — OFFICE VISIT (OUTPATIENT)
Dept: FAMILY MEDICINE CLINIC | Facility: CLINIC | Age: 84
End: 2024-11-13
Payer: MEDICARE

## 2024-11-13 VITALS
HEART RATE: 77 BPM | DIASTOLIC BLOOD PRESSURE: 68 MMHG | HEIGHT: 63 IN | OXYGEN SATURATION: 96 % | BODY MASS INDEX: 25.34 KG/M2 | SYSTOLIC BLOOD PRESSURE: 126 MMHG | WEIGHT: 143 LBS

## 2024-11-13 DIAGNOSIS — H61.21 IMPACTED CERUMEN OF RIGHT EAR: Primary | ICD-10-CM

## 2024-11-13 DIAGNOSIS — E03.9 ACQUIRED HYPOTHYROIDISM: ICD-10-CM

## 2024-11-13 PROCEDURE — 3074F SYST BP LT 130 MM HG: CPT | Performed by: FAMILY MEDICINE

## 2024-11-13 PROCEDURE — 1126F AMNT PAIN NOTED NONE PRSNT: CPT | Performed by: FAMILY MEDICINE

## 2024-11-13 PROCEDURE — 1159F MED LIST DOCD IN RCRD: CPT | Performed by: FAMILY MEDICINE

## 2024-11-13 PROCEDURE — 1160F RVW MEDS BY RX/DR IN RCRD: CPT | Performed by: FAMILY MEDICINE

## 2024-11-13 PROCEDURE — 3078F DIAST BP <80 MM HG: CPT | Performed by: FAMILY MEDICINE

## 2024-11-13 PROCEDURE — 99214 OFFICE O/P EST MOD 30 MIN: CPT | Performed by: FAMILY MEDICINE

## 2024-11-13 NOTE — PROGRESS NOTES
Follow Up Office Visit      Date of Visit:  2024   Patient Name: Yanet Grove  : 1940   MRN: [id number]     Chief Complaint:    Chief Complaint   Patient presents with    Heart Problem     Pt states she feels heart beat in head.  Thumping in head  X4 days       History of Present Illness: Yanet Grove is a 84 y.o. female who is here today for follow up.    History of Present Illness  The patient presents for evaluation of multiple medical concerns.    She reports experiencing a sensation of her heart beating in her head when she is at rest, either lying in bed or sitting in a chair. This has been ongoing for approximately 3 to 4 weeks. She does not experience any associated pain but finds the sensation bothersome. Despite using hearing aids, she is unable to hear anything else. Uses for sermons    She mentions that she was unable to obtain the prescribed ear drops due to a backorder at the pharmacy.    She has been on Evista for several years, which was initially prescribed following a leg fracture. She also takes vitamin D and calcium supplements daily. She underwent a DEXA scan in 2023.    Two weeks ago, she experienced vomiting and diarrhea. She vomited twice during the night and was given Zofran, which helped alleviate her symptoms.      Subjective      Review of Systems:   Review of Systems    Past Medical History:   Past Medical History:   Diagnosis Date    Acquired hypothyroidism     CKD (chronic kidney disease) stage 3, GFR 30-59 ml/min     Coronary arteriosclerosis     Esophageal stricture     Fibroids     Fracture, patella     2 SURGERIES    GERD (gastroesophageal reflux disease)     GERD (gastroesophageal reflux disease)     Glaucoma     Heterotopic ossification of bone 2016    Right Elbow    High risk medication use     DRUG THERAPY FINDING    History of gastric ulcer     HLD (hyperlipidemia)     HTN (hypertension)     Insomnia     Internal hemorrhoids      Megaloblastic anemia     MRSA (methicillin resistant Staphylococcus aureus)     Osteoarthritis, shoulder     Osteopenia     Osteoporosis     Polymyalgia rheumatica     Pseudomonas infection 2014    Right foot wound    Renal failure syndrome     Rotator cuff tear, left     Urinary incontinence     Vitamin D deficiency        Past Surgical History:   Past Surgical History:   Procedure Laterality Date    CARPAL TUNNEL RELEASE Left     CATARACT EXTRACTION Bilateral     COLONOSCOPY  2011    ELBOW ARTHROTOMY Right 04/17/2017    Procedure: Right elbow radical resection capsule soft tissue heterotopic bone with contracture release;  Surgeon: Trung Bonilla MD;  Location:  [facility];  Service:     HYSTERECTOMY      TOTAL    REPLACEMENT TOTAL KNEE  2003    SKIN GRAFT Right 2014    Foot    TOTAL ABDOMINAL HYSTERECTOMY WITH SALPINGO OOPHORECTOMY  1982    TOTAL ELBOW ARTHROPLASTY Right 09/12/2016    Procedure: TOTAL ELBOW ARTHROPLASTY; ULNAR NERVE TRANSPOSITION;  Surgeon: Trung Bonilla MD;  Location:  [facility];  Service:     ULNAR NERVE TRANSPOSITION Right 2016    UPPER GASTROINTESTINAL ENDOSCOPY  2013       Family History:   Family History   Problem Relation Age of Onset    Heart failure Mother     Stroke Mother 70    Lung cancer Father 63       Social History:   Social History     Socioeconomic History    Marital status:    Tobacco Use    Smoking status: Never     Passive exposure: Never    Smokeless tobacco: Never   Vaping Use    Vaping status: Never Used   Substance and Sexual Activity    Alcohol use: No    Drug use: No    Sexual activity: Not Currently     Partners: Male     Comment:  x 58 years       Medications:     Current Outpatient Medications:     aspirin 81 MG EC tablet, Take 2 tablets by mouth Daily., Disp: , Rfl:     cholecalciferol (VITAMIN D3) 1000 UNITS tablet, Take 2 tablets by mouth Daily., Disp: , Rfl:     Diclofenac Sodium (VOLTAREN) 1 % gel gel, Apply 1 g topically to the  "appropriate area as directed 3 (Three) Times a Day. For your thumb (Patient taking differently: Apply 1 g topically to the appropriate area as directed 2 (Two) Times a Day. For your thumb), Disp: 100 g, Rfl: 0    Doxylamine Succinate, Sleep, (SLEEP AID PO), Take  by mouth., Disp: , Rfl:     LORazepam (ATIVAN) 0.5 MG tablet, TAKE 1 TABLET BY MOUTH EVERY NIGHT AT BEDTIME, Disp: 30 tablet, Rfl: 2    Mirabegron ER (MYRBETRIQ) 50 MG tablet sustained-release 24 hour 24 hr tablet, Take 50 mg by mouth Daily., Disp: , Rfl:     raloxifene (EVISTA) 60 MG tablet, TAKE 1 TABLET BY MOUTH DAILY, Disp: 90 tablet, Rfl: 0    rosuvastatin (CRESTOR) 10 MG tablet, TAKE ONE TABLET BY MOUTH ONCE NIGHTLY, Disp: 90 tablet, Rfl: 3    senna (SENOKOT) 8.6 MG tablet, Take 1 tablet by mouth Daily., Disp: , Rfl:   No current facility-administered medications for this visit.    Facility-Administered Medications Ordered in Other Visits:     bupivacaine PF (MARCAINE) 0.25 % injection, , , PRN, Sean Brown CRNA, 50 mg at 09/13/16 0759    Allergies:   Allergies   Allergen Reactions    Phenergan [Promethazine Hcl] Hives       Objective     Physical Exam:  Vital Signs:   Vitals:    11/13/24 1511   BP: 126/68   Pulse: 77   SpO2: 96%   Weight: 64.9 kg (143 lb)   Height: 160 cm (63\")     Body mass index is 25.33 kg/m².     Physical Exam  Constitutional:       General: She is not in acute distress.     Appearance: Normal appearance. She is obese. She is not toxic-appearing or diaphoretic.   HENT:      Head: Normocephalic and atraumatic.      Right Ear: External ear normal. There is impacted cerumen.      Left Ear: Tympanic membrane, ear canal and external ear normal.      Nose: Nose normal.   Eyes:      Pupils: Pupils are equal, round, and reactive to light.   Skin:     General: Skin is warm and dry.   Neurological:      Mental Status: She is alert.   Psychiatric:         Mood and Affect: Mood normal.         Behavior: Behavior normal.       Physical " Exam  Ears show a little blockage.    Results      Procedures      Assessment / Plan      Assessment/Plan:   Diagnoses and all orders for this visit:    1. Impacted cerumen of right ear (Primary)    2. Acquired hypothyroidism       Assessment & Plan  1. Pulsatile tinnitus.  The patient reports a pulsating sensation in the head, particularly noticeable when lying down or sitting quietly. Examination revealed a blockage in the ear. Ear irrigation will be performed to alleviate the symptoms.    2. Osteoporosis.  The patient has been taking Evista for several years and is currently on a regimen that includes vitamin D and calcium supplements. She had a DEXA scan in November 2023, and the next scan is recommended in two years. Continuation of Evista is advised.    3. Medication Management.  The patient mentioned that the medication called in for topical application was on backorder. She has been using the medication effectively and will need a refill in three months.    She had no luck why do not you half a teaspoon of baking soda and a tablespoon of water, slurry that up, and half a cottonball coated with Vaseline for 5 nights.  Go to ENT if they do not help    Follow Up:   Return if symptoms worsen or fail to improve.    Patient or patient representative verbalized consent for the use of Ambient Listening during the visit with  Darin Baugh MD for chart documentation. 11/13/2024  16:31 EST     @[facility] [place]

## 2024-12-16 DIAGNOSIS — F51.01 PRIMARY INSOMNIA: ICD-10-CM

## 2024-12-16 RX ORDER — LORAZEPAM 0.5 MG/1
0.5 TABLET ORAL
Qty: 30 TABLET | Refills: 2 | Status: SHIPPED | OUTPATIENT
Start: 2024-12-16

## 2025-01-27 RX ORDER — ROSUVASTATIN CALCIUM 10 MG/1
10 TABLET, COATED ORAL NIGHTLY
Qty: 90 TABLET | Refills: 3 | Status: SHIPPED | OUTPATIENT
Start: 2025-01-27

## 2025-01-29 ENCOUNTER — OFFICE VISIT (OUTPATIENT)
Dept: FAMILY MEDICINE CLINIC | Facility: CLINIC | Age: 85
End: 2025-01-29
Payer: MEDICARE

## 2025-01-29 VITALS
OXYGEN SATURATION: 94 % | DIASTOLIC BLOOD PRESSURE: 62 MMHG | WEIGHT: 142 LBS | HEIGHT: 63 IN | BODY MASS INDEX: 25.16 KG/M2 | HEART RATE: 93 BPM | SYSTOLIC BLOOD PRESSURE: 124 MMHG

## 2025-01-29 DIAGNOSIS — N18.30 STAGE 3 CHRONIC KIDNEY DISEASE, UNSPECIFIED WHETHER STAGE 3A OR 3B CKD: Primary | ICD-10-CM

## 2025-01-29 DIAGNOSIS — R05.2 SUBACUTE COUGH: ICD-10-CM

## 2025-01-29 DIAGNOSIS — R82.90 ABNORMAL URINE: ICD-10-CM

## 2025-01-29 LAB
BILIRUB BLD-MCNC: NEGATIVE MG/DL
CLARITY, POC: CLEAR
COLOR UR: YELLOW
EXPIRATION DATE: ABNORMAL
GLUCOSE UR STRIP-MCNC: NEGATIVE MG/DL
KETONES UR QL: NEGATIVE
LEUKOCYTE EST, POC: ABNORMAL
Lab: ABNORMAL
NITRITE UR-MCNC: NEGATIVE MG/ML
PH UR: 5.5 [PH] (ref 5–8)
PROT UR STRIP-MCNC: NEGATIVE MG/DL
RBC # UR STRIP: NEGATIVE /UL
SP GR UR: 1.03 (ref 1–1.03)
UROBILINOGEN UR QL: ABNORMAL

## 2025-01-29 PROCEDURE — 3078F DIAST BP <80 MM HG: CPT | Performed by: FAMILY MEDICINE

## 2025-01-29 PROCEDURE — 3074F SYST BP LT 130 MM HG: CPT | Performed by: FAMILY MEDICINE

## 2025-01-29 PROCEDURE — 81003 URINALYSIS AUTO W/O SCOPE: CPT | Performed by: FAMILY MEDICINE

## 2025-01-29 PROCEDURE — 1126F AMNT PAIN NOTED NONE PRSNT: CPT | Performed by: FAMILY MEDICINE

## 2025-01-29 PROCEDURE — 99214 OFFICE O/P EST MOD 30 MIN: CPT | Performed by: FAMILY MEDICINE

## 2025-01-29 RX ORDER — HYDROCODONE POLISTIREX AND CHLORPHENIRAMINE POLISTIREX 10; 8 MG/5ML; MG/5ML
5 SUSPENSION, EXTENDED RELEASE ORAL EVERY 12 HOURS PRN
Qty: 120 ML | Refills: 0 | Status: SHIPPED | OUTPATIENT
Start: 2025-01-29

## 2025-01-29 NOTE — PROGRESS NOTES
Follow Up Office Visit      Date of Visit:  2025   Patient Name: Yanet Grove  : 1940   MRN: [id number]     Chief Complaint:    Chief Complaint   Patient presents with    Kidney Eval     Pt wants us to check urine and labs for her kidneys       History of Present Illness: Yanet Grove is a 84 y.o. female who is here today for follow up.    History of Present Illness  The patient is an 84-year-old female who presents for evaluation of cough, hypertension, and kidney issues.    She reports a persistent cough that has been unresponsive to over-the-counter medications such as Mucinex DM, which she took for 7 days. The cough is severe enough to disrupt her sleep, necessitating the use of 3 pillows at night. She also experiences occasional wheezing but does not report any associated shortness of breath or dizziness.    She has a history of kidney issues, although she currently reports no symptoms. She is under the care of a nephrologist, whom she consults annually. During her last visit in the summer, her blood pressure was elevated at 140 systolic, prompting the nephrologist to recommend a follow-up in 6 months. Despite being prescribed antihypertensive medication, she did not initiate the treatment as her blood pressure readings have consistently been within the normal range. She expresses a desire to avoid additional medications if possible. She recalls a previous episode where her kidney function was reported to be at 17 percent. She is requesting blood work and urine test to check her kidney function.    She reports a decline in her balance, which she attributes to her age. She has a history of falls, including one incident where she fractured her leg. She uses hearing aids and has undergone an ear scan. She does not experience any leg swelling. She has a history of leg fracture and is on Evista. She is requesting a refill of Evista.    Supplemental Information  She has never been  diagnosed with diabetes. She occasionally has trouble sleeping.    MEDICATIONS  Current: Mucinex DM, Evista + others      Subjective      Review of Systems:   Review of Systems    Past Medical History:   Past Medical History:   Diagnosis Date    Acquired hypothyroidism     CKD (chronic kidney disease) stage 3, GFR 30-59 ml/min     Coronary arteriosclerosis     Esophageal stricture     Fibroids     Fracture, patella     2 SURGERIES    GERD (gastroesophageal reflux disease)     GERD (gastroesophageal reflux disease)     Glaucoma     Heterotopic ossification of bone 2016    Right Elbow    High risk medication use     DRUG THERAPY FINDING    History of gastric ulcer     HLD (hyperlipidemia)     HTN (hypertension)     Insomnia     Internal hemorrhoids     Megaloblastic anemia     MRSA (methicillin resistant Staphylococcus aureus)     Osteoarthritis, shoulder     Osteopenia     Osteoporosis     Polymyalgia rheumatica     Pseudomonas infection 2014    Right foot wound    Renal failure syndrome     Rotator cuff tear, left     Urinary incontinence     Vitamin D deficiency        Past Surgical History:   Past Surgical History:   Procedure Laterality Date    CARPAL TUNNEL RELEASE Left     CATARACT EXTRACTION Bilateral     COLONOSCOPY  2011    ELBOW ARTHROTOMY Right 04/17/2017    Procedure: Right elbow radical resection capsule soft tissue heterotopic bone with contracture release;  Surgeon: Trung Bonilla MD;  Location:  [facility];  Service:     HYSTERECTOMY      TOTAL    REPLACEMENT TOTAL KNEE  2003    SKIN GRAFT Right 2014    Foot    TOTAL ABDOMINAL HYSTERECTOMY WITH SALPINGO OOPHORECTOMY  1982    TOTAL ELBOW ARTHROPLASTY Right 09/12/2016    Procedure: TOTAL ELBOW ARTHROPLASTY; ULNAR NERVE TRANSPOSITION;  Surgeon: Trung Bonilla MD;  Location:  [facility];  Service:     ULNAR NERVE TRANSPOSITION Right 2016    UPPER GASTROINTESTINAL ENDOSCOPY  2013       Family History:   Family History   Problem Relation Age of  Onset    Heart failure Mother     Stroke Mother 70    Lung cancer Father 63       Social History:   Social History     Socioeconomic History    Marital status:    Tobacco Use    Smoking status: Never     Passive exposure: Never    Smokeless tobacco: Never   Vaping Use    Vaping status: Never Used   Substance and Sexual Activity    Alcohol use: No    Drug use: No    Sexual activity: Not Currently     Partners: Male     Comment:  x 58 years       Medications:     Current Outpatient Medications:     aspirin 81 MG EC tablet, Take 2 tablets by mouth Daily., Disp: , Rfl:     cholecalciferol (VITAMIN D3) 1000 UNITS tablet, Take 2 tablets by mouth Daily., Disp: , Rfl:     Diclofenac Sodium (VOLTAREN) 1 % gel gel, Apply 1 g topically to the appropriate area as directed 3 (Three) Times a Day. For your thumb (Patient taking differently: Apply 1 g topically to the appropriate area as directed 2 (Two) Times a Day. For your thumb), Disp: 100 g, Rfl: 0    Doxylamine Succinate, Sleep, (SLEEP AID PO), Take  by mouth., Disp: , Rfl:     LORazepam (ATIVAN) 0.5 MG tablet, TAKE 1 TABLET BY MOUTH EVERY NIGHT AT BEDTIME, Disp: 30 tablet, Rfl: 2    Mirabegron ER (MYRBETRIQ) 50 MG tablet sustained-release 24 hour 24 hr tablet, Take 50 mg by mouth Daily., Disp: , Rfl:     raloxifene (EVISTA) 60 MG tablet, TAKE 1 TABLET BY MOUTH DAILY, Disp: 90 tablet, Rfl: 0    rosuvastatin (CRESTOR) 10 MG tablet, TAKE ONE TABLET BY MOUTH ONCE NIGHTLY, Disp: 90 tablet, Rfl: 3    senna (SENOKOT) 8.6 MG tablet, Take 1 tablet by mouth Daily., Disp: , Rfl:     Hydrocod Kwadwo-Chlorphe Kwadwo ER (TUSSIONEX PENNKINETIC) 10-8 MG/5ML ER suspension, Take 5 mL by mouth Every 12 (Twelve) Hours As Needed for Cough., Disp: 120 mL, Rfl: 0  No current facility-administered medications for this visit.    Facility-Administered Medications Ordered in Other Visits:     bupivacaine PF (MARCAINE) 0.25 % injection, , , PRN, Sean Brown CRNA, 50 mg at 09/13/16  "[id number]    Allergies:   Allergies   Allergen Reactions    Phenergan [Promethazine Hcl] Hives       Objective     Physical Exam:  Vital Signs:   Vitals:    01/29/25 0840   BP: 124/62   Pulse: 93   SpO2: 94%   Weight: 64.4 kg (142 lb)   Height: 160 cm (63\")     Body mass index is 25.15 kg/m².     Physical Exam  Vitals and nursing note reviewed.   Constitutional:       Appearance: Normal appearance. She is normal weight.   HENT:      Head: Normocephalic.      Right Ear: External ear normal.      Left Ear: External ear normal.      Nose: Nose normal.   Eyes:      Pupils: Pupils are equal, round, and reactive to light.   Cardiovascular:      Rate and Rhythm: Normal rate and regular rhythm.      Pulses: Normal pulses.      Heart sounds: Normal heart sounds.   Pulmonary:      Effort: Pulmonary effort is normal.      Breath sounds: Normal breath sounds.   Musculoskeletal:      Cervical back: Normal range of motion and neck supple.   Skin:     General: Skin is warm and dry.   Neurological:      Mental Status: She is alert.   Psychiatric:         Mood and Affect: Mood normal.       Physical Exam  Lungs are clear.  Heart sounds are normal.    Results      Procedures      Assessment / Plan      Assessment/Plan:   Diagnoses and all orders for this visit:    1. Stage 3 chronic kidney disease, unspecified whether stage 3a or 3b CKD (Primary)  -     Comprehensive Metabolic Panel  -     POC Urinalysis Dipstick, Automated    2. Subacute cough  -     Hydrocod Kwadwo-Chlorphe Kwadwo ER (TUSSIONEX PENNKINETIC) 10-8 MG/5ML ER suspension; Take 5 mL by mouth Every 12 (Twelve) Hours As Needed for Cough.  Dispense: 120 mL; Refill: 0       Assessment & Plan  1. Cough.  She reports a persistent cough despite taking Mucinex DM for 7 days. She experiences wheezing but does not have shortness of breath or dizziness. A prescription for hydrocodone will be provided to manage the cough. She is advised to use it cautiously.    2. Hypertension.  Her blood " pressure was previously noted to be 140 systolic during a nephrology visit. She has not been taking the prescribed blood pressure medication as her readings have been normal at home.    3. Kidney function monitoring.  She has a history of kidney issues and wants to ensure her kidney function is stable. Blood work and a urine test will be conducted today to assess her kidney function.    4. Medication management.  She mentions needing a refill for Evista, a bone medication. She is informed about the 20% risk of clots and the potential decrease in breast cancer risk associated with Evista.    Follow Up:   No follow-ups on file.    Patient or patient representative verbalized consent for the use of Ambient Listening during the visit with  Darin Baugh MD for chart documentation. 1/29/2025  09:29 EST     @[facility] [place]

## 2025-01-30 LAB
ALBUMIN SERPL-MCNC: 4.5 G/DL (ref 3.7–4.7)
ALP SERPL-CCNC: 54 IU/L (ref 44–121)
ALT SERPL-CCNC: 9 IU/L (ref 0–32)
AST SERPL-CCNC: 20 IU/L (ref 0–40)
BILIRUB SERPL-MCNC: 0.9 MG/DL (ref 0–1.2)
BUN SERPL-MCNC: 17 MG/DL (ref 8–27)
BUN/CREAT SERPL: 11 (ref 12–28)
CALCIUM SERPL-MCNC: 9.5 MG/DL (ref 8.7–10.3)
CHLORIDE SERPL-SCNC: 104 MMOL/L (ref 96–106)
CO2 SERPL-SCNC: 23 MMOL/L (ref 20–29)
CREAT SERPL-MCNC: 1.51 MG/DL (ref 0.57–1)
EGFRCR SERPLBLD CKD-EPI 2021: 34 ML/MIN/1.73
GLOBULIN SER CALC-MCNC: 2.1 G/DL (ref 1.5–4.5)
GLUCOSE SERPL-MCNC: 97 MG/DL (ref 70–99)
POTASSIUM SERPL-SCNC: 4.5 MMOL/L (ref 3.5–5.2)
PROT SERPL-MCNC: 6.6 G/DL (ref 6–8.5)
SODIUM SERPL-SCNC: 141 MMOL/L (ref 134–144)

## 2025-01-31 ENCOUNTER — TELEPHONE (OUTPATIENT)
Dept: FAMILY MEDICINE CLINIC | Facility: CLINIC | Age: 85
End: 2025-01-31
Payer: MEDICARE

## 2025-01-31 LAB
BACTERIA UR CULT: NORMAL
BACTERIA UR CULT: NORMAL

## 2025-01-31 NOTE — TELEPHONE ENCOUNTER
"Called pt, no answer on home number.  Called son Sean, on verbal informed him of the results.   Requested that we call pt after noon and explain it to her since she is getting her hair done currently.       ----- Message from Darin Baugh sent at 1/30/2025  7:44 PM EST -----  Yanet, your kidney function is a tad worse, but anyone fighting a \"cold\" would be . I dont have the culture back yet. Let us repeat the tests in 1 month when you feel better.  "

## 2025-02-06 RX ORDER — RALOXIFENE HYDROCHLORIDE 60 MG/1
60 TABLET, FILM COATED ORAL DAILY
Qty: 90 TABLET | Refills: 0 | Status: SHIPPED | OUTPATIENT
Start: 2025-02-06

## 2025-02-24 ENCOUNTER — TELEPHONE (OUTPATIENT)
Dept: FAMILY MEDICINE CLINIC | Facility: CLINIC | Age: 85
End: 2025-02-24
Payer: MEDICARE

## 2025-02-24 NOTE — TELEPHONE ENCOUNTER
Patient came in office wanting to let Dr. Baugh know she is seeing a nephrologist on 4/13. Please advise.

## 2025-03-17 ENCOUNTER — TELEPHONE (OUTPATIENT)
Dept: FAMILY MEDICINE CLINIC | Facility: CLINIC | Age: 85
End: 2025-03-17
Payer: MEDICARE

## 2025-03-17 DIAGNOSIS — F51.01 PRIMARY INSOMNIA: ICD-10-CM

## 2025-03-17 RX ORDER — LORAZEPAM 0.5 MG/1
0.5 TABLET ORAL
Qty: 30 TABLET | Refills: 2 | Status: SHIPPED | OUTPATIENT
Start: 2025-03-17

## 2025-03-17 NOTE — TELEPHONE ENCOUNTER
Incoming Refill Request      Medication requested (name and dose):   Lorazepam 0.5    Pharmacy where request should be sent:   [facility]    Additional details provided by patient:   none    Best call back number:   [phone]    Does the patient have less than a 3 day supply:  [x] Yes  [] No    Christina Tapia Rep  03/17/25, 09:37 EDT

## 2025-03-18 RX ORDER — LORAZEPAM 0.5 MG/1
0.5 TABLET ORAL
Qty: 30 TABLET | OUTPATIENT
Start: 2025-03-18

## 2025-03-28 ENCOUNTER — TELEPHONE (OUTPATIENT)
Dept: FAMILY MEDICINE CLINIC | Facility: CLINIC | Age: 85
End: 2025-03-28

## 2025-03-28 NOTE — TELEPHONE ENCOUNTER
Patient wanted to inform Dr. Baugh that she went to her nephrology appointment and there was a good report. She goes back 12/09/25.

## 2025-05-12 RX ORDER — RALOXIFENE HYDROCHLORIDE 60 MG/1
60 TABLET, FILM COATED ORAL DAILY
Qty: 90 TABLET | Refills: 1 | Status: SHIPPED | OUTPATIENT
Start: 2025-05-12

## 2025-06-11 DIAGNOSIS — F51.01 PRIMARY INSOMNIA: ICD-10-CM

## 2025-06-11 RX ORDER — LORAZEPAM 0.5 MG/1
0.5 TABLET ORAL
Qty: 30 TABLET | Refills: 1 | Status: SHIPPED | OUTPATIENT
Start: 2025-06-11

## 2025-07-29 ENCOUNTER — OFFICE VISIT (OUTPATIENT)
Dept: FAMILY MEDICINE CLINIC | Facility: CLINIC | Age: 85
End: 2025-07-29
Payer: MEDICARE

## 2025-07-29 VITALS
DIASTOLIC BLOOD PRESSURE: 80 MMHG | SYSTOLIC BLOOD PRESSURE: 138 MMHG | BODY MASS INDEX: 25.34 KG/M2 | HEART RATE: 88 BPM | WEIGHT: 143 LBS | HEIGHT: 63 IN | OXYGEN SATURATION: 98 %

## 2025-07-29 DIAGNOSIS — M79.644 PAIN OF RIGHT THUMB: ICD-10-CM

## 2025-07-29 DIAGNOSIS — N18.30 STAGE 3 CHRONIC KIDNEY DISEASE, UNSPECIFIED WHETHER STAGE 3A OR 3B CKD: ICD-10-CM

## 2025-07-29 DIAGNOSIS — I10 PRIMARY HYPERTENSION: ICD-10-CM

## 2025-07-29 DIAGNOSIS — M19.042 OSTEOARTHRITIS OF LEFT HAND, UNSPECIFIED OSTEOARTHRITIS TYPE: Primary | ICD-10-CM

## 2025-07-29 DIAGNOSIS — E03.9 ACQUIRED HYPOTHYROIDISM: ICD-10-CM

## 2025-07-29 PROCEDURE — 1160F RVW MEDS BY RX/DR IN RCRD: CPT | Performed by: FAMILY MEDICINE

## 2025-07-29 PROCEDURE — 1159F MED LIST DOCD IN RCRD: CPT | Performed by: FAMILY MEDICINE

## 2025-07-29 PROCEDURE — 99214 OFFICE O/P EST MOD 30 MIN: CPT | Performed by: FAMILY MEDICINE

## 2025-07-29 PROCEDURE — 3079F DIAST BP 80-89 MM HG: CPT | Performed by: FAMILY MEDICINE

## 2025-07-29 PROCEDURE — 3075F SYST BP GE 130 - 139MM HG: CPT | Performed by: FAMILY MEDICINE

## 2025-07-29 PROCEDURE — 1126F AMNT PAIN NOTED NONE PRSNT: CPT | Performed by: FAMILY MEDICINE

## 2025-07-29 NOTE — PROGRESS NOTES
Follow Up Office Visit      Date of Visit:  2025   Patient Name: Yanet Grove  : 1940   MRN: [id number]     Chief Complaint:    Chief Complaint   Patient presents with    RIGHT HAND PAIN       History of Present Illness: Yanet Grove is a 85 y.o. female who is here today for follow up.    History of Present Illness  The patient presents for evaluation of thumb pain.    She has been experiencing pain in her thumb for several years, initially seeking an injection but was instead prescribed a topical cream, which she used until it was no longer available. The pain has now extended to her elbow, prompting her to seek medical attention to rule out any issues with the elbow. An x-ray confirmed that her elbow is in good condition. She reports no neck pain and maintains good strength in her hand. She has an upcoming appointment with a hand surgeon on 09/15/2025. She has not applied anything hot to the area and continues to use Voltaren. She has previously consulted with Dr. Charles Matthews, who administered a shot in her hand. She also mentions a history of trigger finger, for which she received treatment from Dr. Vidal 7 years ago. This included an injection that provided long-term relief.    She continues to take sleeping pills and has recently started taking half of a small blue pill. Despite this, she still experiences difficulty sleeping on some nights. She is considering trying Ambien.    Social History:  Sleep: She continues to take sleeping pills and has recently started taking half of a small blue pill. Despite this, she still experiences difficulty sleeping on some nights.    PAST SURGICAL HISTORY:  She mentions a history of trigger finger, for which she received treatment from Dr. Vidal 7 years ago. This included an injection that provided long-term relief.  She also reports a past foot infection that required treatment at the wound center in [place] for 21 days.      Subjective       Review of Systems:   Review of Systems    Past Medical History:   Past Medical History:   Diagnosis Date    Acquired hypothyroidism     CKD (chronic kidney disease) stage 3, GFR 30-59 ml/min     Coronary arteriosclerosis     Esophageal stricture     Fibroids     Fracture, patella     2 SURGERIES    GERD (gastroesophageal reflux disease)     GERD (gastroesophageal reflux disease)     Glaucoma     Heterotopic ossification of bone 2016    Right Elbow    High risk medication use     DRUG THERAPY FINDING    History of gastric ulcer     HLD (hyperlipidemia)     HTN (hypertension)     Insomnia     Internal hemorrhoids     Megaloblastic anemia     MRSA (methicillin resistant Staphylococcus aureus)     Osteoarthritis, shoulder     Osteopenia     Osteoporosis     Polymyalgia rheumatica     Pseudomonas infection 2014    Right foot wound    Renal failure syndrome     Rotator cuff tear, left     Urinary incontinence     Vitamin D deficiency        Past Surgical History:   Past Surgical History:   Procedure Laterality Date    CARPAL TUNNEL RELEASE Left     CATARACT EXTRACTION Bilateral     COLONOSCOPY  2011    ELBOW ARTHROTOMY Right 04/17/2017    Procedure: Right elbow radical resection capsule soft tissue heterotopic bone with contracture release;  Surgeon: Trung Bonilla MD;  Location:  [facility];  Service:     HYSTERECTOMY      TOTAL    REPLACEMENT TOTAL KNEE  2003    SKIN GRAFT Right 2014    Foot    TOTAL ABDOMINAL HYSTERECTOMY WITH SALPINGO OOPHORECTOMY  1982    TOTAL ELBOW ARTHROPLASTY Right 09/12/2016    Procedure: TOTAL ELBOW ARTHROPLASTY; ULNAR NERVE TRANSPOSITION;  Surgeon: Trung Bonilla MD;  Location:  [facility];  Service:     ULNAR NERVE TRANSPOSITION Right 2016    UPPER GASTROINTESTINAL ENDOSCOPY  2013       Family History:   Family History   Problem Relation Age of Onset    Heart failure Mother     Stroke Mother 70    Lung cancer Father 63       Social History:   Social History     Socioeconomic History     Marital status:    Tobacco Use    Smoking status: Never     Passive exposure: Never    Smokeless tobacco: Never   Vaping Use    Vaping status: Never Used   Substance and Sexual Activity    Alcohol use: No    Drug use: No    Sexual activity: Not Currently     Partners: Male     Comment:  x 58 years       Medications:     Current Outpatient Medications:     aspirin 81 MG EC tablet, Take 2 tablets by mouth Daily., Disp: , Rfl:     cholecalciferol (VITAMIN D3) 1000 UNITS tablet, Take 2 tablets by mouth Daily., Disp: , Rfl:     Diclofenac Sodium (VOLTAREN) 1 % gel gel, Apply 1 g topically to the appropriate area as directed 3 (Three) Times a Day. For your thumb (Patient taking differently: Apply 1 g topically to the appropriate area as directed 2 (Two) Times a Day. For your thumb), Disp: 100 g, Rfl: 0    Doxylamine Succinate, Sleep, (SLEEP AID PO), Take  by mouth., Disp: , Rfl:     Hydrocod Kwadwo-Chlorphe Kwadwo ER (TUSSIONEX PENNKINETIC) 10-8 MG/5ML ER suspension, Take 5 mL by mouth Every 12 (Twelve) Hours As Needed for Cough., Disp: 120 mL, Rfl: 0    LORazepam (ATIVAN) 0.5 MG tablet, TAKE 1 TABLET BY MOUTH EVERY NIGHT AT BEDTIME, Disp: 30 tablet, Rfl: 1    Mirabegron ER (MYRBETRIQ) 50 MG tablet sustained-release 24 hour 24 hr tablet, Take 50 mg by mouth Daily., Disp: , Rfl:     raloxifene (EVISTA) 60 MG tablet, TAKE 1 TABLET BY MOUTH DAILY, Disp: 90 tablet, Rfl: 1    rosuvastatin (CRESTOR) 10 MG tablet, TAKE ONE TABLET BY MOUTH ONCE NIGHTLY, Disp: 90 tablet, Rfl: 3    senna (SENOKOT) 8.6 MG tablet, Take 1 tablet by mouth Daily., Disp: , Rfl:   No current facility-administered medications for this visit.    Facility-Administered Medications Ordered in Other Visits:     bupivacaine PF (MARCAINE) 0.25 % injection, , , PRN, Sean Brown CRNA, 50 mg at 09/13/16 0759    Allergies:   Allergies   Allergen Reactions    Phenergan [Promethazine Hcl] Hives       Objective     Physical Exam:  Vital Signs:  "  Vitals:    07/29/25 1010   BP: 138/80   Pulse: 88   SpO2: 98%   Weight: 64.9 kg (143 lb)   Height: 160 cm (63\")     Body mass index is 25.33 kg/m².     Physical Exam  Constitutional:       General: She is not in acute distress.     Appearance: Normal appearance. She is obese. She is not toxic-appearing or diaphoretic.   HENT:      Head: Normocephalic and atraumatic.      Right Ear: External ear normal.      Left Ear: External ear normal.      Nose: Nose normal.   Eyes:      Pupils: Pupils are equal, round, and reactive to light.   Musculoskeletal:        Arms:       Comments: Big joint, 3+ enlarged, tender.   Skin:     General: Skin is warm and dry.   Neurological:      Mental Status: She is alert.   Psychiatric:         Mood and Affect: Mood normal.         Behavior: Behavior normal.       Physical Exam  Neck: No abnormalities noted.  Extremities: No swelling noted in the left hand. Good strength.  Musculoskeletal: Pain and tenderness in the left thumb and elbow. No abnormalities noted in the neck.    Results  Imaging   - X-ray of the elbow: No abnormalities    Procedures      Assessment / Plan      Assessment/Plan:   Diagnoses and all orders for this visit:    1. Osteoarthritis of left hand, unspecified osteoarthritis type (Primary)  -     Ambulatory Referral to Rheumatology    2. Primary hypertension    3. Acquired hypothyroidism    4. Pain of right thumb    5. Stage 3 chronic kidney disease, unspecified whether stage 3a or 3b CKD       Assessment & Plan  1. Osteoarthritis.  - Symptoms consistent with osteoarthritis, particularly in the thumb and elbow.  - Physical exam findings include pain in the thumb and elbow, with good strength in the hand.  - Referral to a hand surgeon for further evaluation and potential treatment options, including injections.  - Advised to apply ice to the affected area for pain relief.    2. Insomnia.  - Continues to take her current sleeping pill but reports inconsistent sleep " quality.  - Ambien discussed as a potential option; informed about its side effects, including forgetfulness and sleepwalking.  - Advised against using Ambien due to these potential side effects.  - Prescription for current sleeping pill will continue with follow-up every 6 months.    Follow-up: The patient will follow up in 6 months.    Follow Up:   Return in about 6 months (around 1/29/2026) for Medicare Wellness.    Patient or patient representative verbalized consent for the use of Ambient Listening during the visit with  Darin Baugh MD for chart documentation. 7/29/2025  11:02 EDT     @[facility] [place]

## 2025-08-12 DIAGNOSIS — F51.01 PRIMARY INSOMNIA: ICD-10-CM

## 2025-08-13 RX ORDER — LORAZEPAM 0.5 MG/1
0.5 TABLET ORAL
Qty: 30 TABLET | Refills: 0 | Status: SHIPPED | OUTPATIENT
Start: 2025-08-13

## 2025-08-27 ENCOUNTER — OFFICE VISIT (OUTPATIENT)
Dept: CARDIOLOGY | Facility: CLINIC | Age: 85
End: 2025-08-27
Payer: MEDICARE

## 2025-08-27 VITALS
HEART RATE: 80 BPM | BODY MASS INDEX: 24.75 KG/M2 | SYSTOLIC BLOOD PRESSURE: 142 MMHG | DIASTOLIC BLOOD PRESSURE: 58 MMHG | OXYGEN SATURATION: 98 % | HEIGHT: 64 IN | WEIGHT: 145 LBS

## 2025-08-27 DIAGNOSIS — E78.2 MIXED HYPERLIPIDEMIA: ICD-10-CM

## 2025-08-27 DIAGNOSIS — I10 PRIMARY HYPERTENSION: Primary | ICD-10-CM

## 2025-08-27 PROCEDURE — 99213 OFFICE O/P EST LOW 20 MIN: CPT | Performed by: INTERNAL MEDICINE

## 2025-08-27 PROCEDURE — 3078F DIAST BP <80 MM HG: CPT | Performed by: INTERNAL MEDICINE

## 2025-08-27 PROCEDURE — 3077F SYST BP >= 140 MM HG: CPT | Performed by: INTERNAL MEDICINE

## 2025-08-27 RX ORDER — TIMOLOL MALEATE 2.5 MG/ML
1 SOLUTION/ DROPS OPHTHALMIC NIGHTLY
COMMUNITY
Start: 2025-08-11

## 2025-08-27 RX ORDER — LATANOPROST 50 UG/ML
1 SOLUTION/ DROPS OPHTHALMIC
COMMUNITY
Start: 2025-08-25

## (undated) DEVICE — AIRWY 90MM NO9

## (undated) DEVICE — CONVERTORS STOCKINETTE: Brand: CONVERTORS

## (undated) DEVICE — APPL CHLORAPREP W/TINT 26ML BLU

## (undated) DEVICE — PAD ARMBRD SURG CONVOL 7.5X20X2IN

## (undated) DEVICE — FLTR HME STR UNIV W/SMPL PORT

## (undated) DEVICE — CANNULA,ADULT,SOFT-TOUCH,7TUBE,SC: Brand: MEDLINE

## (undated) DEVICE — INTENDED USE FOR SURGICAL MARKING ON INTACT SKIN, ALSO PROVIDES A PERMANENT METHOD OF IDENTIFYING OBJECTS IN THE OPERATING ROOM: Brand: WRITESITE® REGULAR TIP SKIN MARKER

## (undated) DEVICE — GLV SURG TRIUMPH CLASSIC PF LTX 7.5 STRL

## (undated) DEVICE — 2000CC GUARDIAN II: Brand: GUARDIAN

## (undated) DEVICE — MEDI-VAC NON-CONDUCTIVE SUCTION TUBING: Brand: CARDINAL HEALTH

## (undated) DEVICE — NERVE BLOCK SUPPORT KIT/BLUE: Brand: MEDLINE INDUSTRIES, INC.

## (undated) DEVICE — BNDG ELAS ELITE V/CLOSE 4IN 5YD LF STRL

## (undated) DEVICE — 3M™ TEGADERM™ CHG DRESSING 25/CARTON 4 CARTONS/CASE 1658: Brand: TEGADERM™

## (undated) DEVICE — DRAPE,HAND,STERILE: Brand: MEDLINE

## (undated) DEVICE — BNDG ELAS CO-FLEX SLF ADHR 4IN5YD LF STRL

## (undated) DEVICE — GLV SURG SIGNATURE TOUCH PF LTX 8 STRL BX/50

## (undated) DEVICE — MEDI-VAC YANKAUER SUCTION HANDLE W/BULBOUS TIP: Brand: CARDINAL HEALTH

## (undated) DEVICE — SOL LR 1000ML

## (undated) DEVICE — 3M™ STERI-DRAPE™ U-DRAPE 1015: Brand: STERI-DRAPE™

## (undated) DEVICE — SPNG GZ WOVN 4X4IN 12PLY 10/BX STRL

## (undated) DEVICE — PK EXTREM LOWR 10

## (undated) DEVICE — SENSR O2 OXIMAX FNGR A/ 18IN NONSTR